# Patient Record
Sex: MALE | Race: WHITE | NOT HISPANIC OR LATINO | Employment: OTHER | URBAN - METROPOLITAN AREA
[De-identification: names, ages, dates, MRNs, and addresses within clinical notes are randomized per-mention and may not be internally consistent; named-entity substitution may affect disease eponyms.]

---

## 2017-01-09 ENCOUNTER — AMBULATORY - HEALTHEAST (OUTPATIENT)
Dept: INTERNAL MEDICINE | Facility: CLINIC | Age: 72
End: 2017-01-09

## 2017-01-09 ENCOUNTER — COMMUNICATION - HEALTHEAST (OUTPATIENT)
Dept: INTERNAL MEDICINE | Facility: CLINIC | Age: 72
End: 2017-01-09

## 2017-01-09 DIAGNOSIS — G89.29 CHRONIC LOW BACK PAIN: ICD-10-CM

## 2017-01-09 DIAGNOSIS — M54.50 CHRONIC LOW BACK PAIN: ICD-10-CM

## 2017-02-16 ENCOUNTER — RECORDS - HEALTHEAST (OUTPATIENT)
Dept: ADMINISTRATIVE | Facility: OTHER | Age: 72
End: 2017-02-16

## 2017-02-18 ENCOUNTER — COMMUNICATION - HEALTHEAST (OUTPATIENT)
Dept: INTERNAL MEDICINE | Facility: CLINIC | Age: 72
End: 2017-02-18

## 2017-02-18 DIAGNOSIS — M47.816 OSTEOARTHRITIS OF LUMBAR SPINE: ICD-10-CM

## 2017-02-26 ENCOUNTER — RECORDS - HEALTHEAST (OUTPATIENT)
Dept: ADMINISTRATIVE | Facility: OTHER | Age: 72
End: 2017-02-26

## 2017-03-13 ENCOUNTER — OFFICE VISIT - HEALTHEAST (OUTPATIENT)
Dept: INTERNAL MEDICINE | Facility: CLINIC | Age: 72
End: 2017-03-13

## 2017-03-13 DIAGNOSIS — R05.9 COUGH: ICD-10-CM
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
## 2017-03-13 DIAGNOSIS — J32.0 CHRONIC MAXILLARY SINUSITIS: ICD-10-CM

## 2017-03-13 DIAGNOSIS — J32.0 CHRONIC SINUSITIS OF BOTH MAXILLARY SINUSES: ICD-10-CM

## 2017-03-13 ASSESSMENT — MIFFLIN-ST. JEOR: SCORE: 1701.74

## 2017-03-15 ENCOUNTER — COMMUNICATION - HEALTHEAST (OUTPATIENT)
Dept: INTERNAL MEDICINE | Facility: CLINIC | Age: 72
End: 2017-03-15

## 2017-03-15 LAB
C-ANCA - HISTORICAL: NEGATIVE
P-ANCA - HISTORICAL: NEGATIVE

## 2017-03-30 ENCOUNTER — COMMUNICATION - HEALTHEAST (OUTPATIENT)
Dept: INTERNAL MEDICINE | Facility: CLINIC | Age: 72
End: 2017-03-30

## 2017-04-04 ENCOUNTER — COMMUNICATION - HEALTHEAST (OUTPATIENT)
Dept: INTERNAL MEDICINE | Facility: CLINIC | Age: 72
End: 2017-04-04

## 2017-04-05 ENCOUNTER — OFFICE VISIT - HEALTHEAST (OUTPATIENT)
Dept: INTERNAL MEDICINE | Facility: CLINIC | Age: 72
End: 2017-04-05

## 2017-04-05 DIAGNOSIS — Z01.818 PREOPERATIVE EXAMINATION: ICD-10-CM

## 2017-04-05 DIAGNOSIS — H02.403 BLEPHAROPTOSIS, BILATERAL: ICD-10-CM

## 2017-04-05 ASSESSMENT — MIFFLIN-ST. JEOR: SCORE: 1692.67

## 2017-04-26 ENCOUNTER — AMBULATORY - HEALTHEAST (OUTPATIENT)
Dept: INTERNAL MEDICINE | Facility: CLINIC | Age: 72
End: 2017-04-26

## 2017-04-26 ENCOUNTER — OFFICE VISIT - HEALTHEAST (OUTPATIENT)
Dept: INTERNAL MEDICINE | Facility: CLINIC | Age: 72
End: 2017-04-26

## 2017-04-26 DIAGNOSIS — Z01.818 PREOP EXAMINATION: ICD-10-CM

## 2017-04-26 DIAGNOSIS — J32.0 MAXILLARY SINUSITIS, CHRONIC: ICD-10-CM

## 2017-04-26 ASSESSMENT — MIFFLIN-ST. JEOR: SCORE: 1697.21

## 2017-05-01 ENCOUNTER — RECORDS - HEALTHEAST (OUTPATIENT)
Dept: ADMINISTRATIVE | Facility: OTHER | Age: 72
End: 2017-05-01

## 2017-05-27 ENCOUNTER — COMMUNICATION - HEALTHEAST (OUTPATIENT)
Dept: INTERNAL MEDICINE | Facility: CLINIC | Age: 72
End: 2017-05-27

## 2017-05-27 DIAGNOSIS — M47.816 OSTEOARTHRITIS OF LUMBAR SPINE: ICD-10-CM

## 2017-07-18 ENCOUNTER — COMMUNICATION - HEALTHEAST (OUTPATIENT)
Dept: INTERNAL MEDICINE | Facility: CLINIC | Age: 72
End: 2017-07-18

## 2017-07-18 ENCOUNTER — RECORDS - HEALTHEAST (OUTPATIENT)
Dept: ADMINISTRATIVE | Facility: OTHER | Age: 72
End: 2017-07-18

## 2017-08-08 ENCOUNTER — RECORDS - HEALTHEAST (OUTPATIENT)
Dept: ADMINISTRATIVE | Facility: OTHER | Age: 72
End: 2017-08-08

## 2017-08-10 ENCOUNTER — RECORDS - HEALTHEAST (OUTPATIENT)
Dept: ADMINISTRATIVE | Facility: OTHER | Age: 72
End: 2017-08-10

## 2017-08-14 ENCOUNTER — HOSPITAL ENCOUNTER (OUTPATIENT)
Dept: CT IMAGING | Facility: CLINIC | Age: 72
Discharge: HOME | End: 2017-08-14

## 2017-08-14 DIAGNOSIS — R05.9 COUGH: ICD-10-CM

## 2017-08-17 ENCOUNTER — RECORDS - HEALTHEAST (OUTPATIENT)
Dept: ADMINISTRATIVE | Facility: OTHER | Age: 72
End: 2017-08-17

## 2017-08-21 ENCOUNTER — RECORDS - HEALTHEAST (OUTPATIENT)
Dept: ADMINISTRATIVE | Facility: OTHER | Age: 72
End: 2017-08-21

## 2017-09-05 ENCOUNTER — OFFICE VISIT - HEALTHEAST (OUTPATIENT)
Dept: FAMILY MEDICINE | Facility: CLINIC | Age: 72
End: 2017-09-05

## 2017-09-05 DIAGNOSIS — R05.9 COUGH: ICD-10-CM

## 2017-09-06 ENCOUNTER — COMMUNICATION - HEALTHEAST (OUTPATIENT)
Dept: INTERNAL MEDICINE | Facility: CLINIC | Age: 72
End: 2017-09-06

## 2017-09-06 DIAGNOSIS — M47.816 OSTEOARTHRITIS OF LUMBAR SPINE: ICD-10-CM

## 2017-09-12 ENCOUNTER — RECORDS - HEALTHEAST (OUTPATIENT)
Dept: ADMINISTRATIVE | Facility: OTHER | Age: 72
End: 2017-09-12

## 2017-10-17 ENCOUNTER — RECORDS - HEALTHEAST (OUTPATIENT)
Dept: ADMINISTRATIVE | Facility: OTHER | Age: 72
End: 2017-10-17

## 2018-02-06 ENCOUNTER — RECORDS - HEALTHEAST (OUTPATIENT)
Dept: ADMINISTRATIVE | Facility: OTHER | Age: 73
End: 2018-02-06

## 2018-02-06 ENCOUNTER — COMMUNICATION - HEALTHEAST (OUTPATIENT)
Dept: INTERNAL MEDICINE | Facility: CLINIC | Age: 73
End: 2018-02-06

## 2018-02-09 ENCOUNTER — AMBULATORY - HEALTHEAST (OUTPATIENT)
Dept: INTERNAL MEDICINE | Facility: CLINIC | Age: 73
End: 2018-02-09

## 2018-02-12 ENCOUNTER — OFFICE VISIT - HEALTHEAST (OUTPATIENT)
Dept: INTERNAL MEDICINE | Facility: CLINIC | Age: 73
End: 2018-02-12

## 2018-02-12 DIAGNOSIS — G47.33 OBSTRUCTIVE SLEEP APNEA HYPOPNEA, SEVERE: ICD-10-CM

## 2018-02-12 DIAGNOSIS — Z01.818 PREOP EXAMINATION: ICD-10-CM

## 2018-02-12 DIAGNOSIS — J34.2 NASAL SEPTAL DEVIATION: ICD-10-CM

## 2018-02-12 DIAGNOSIS — J32.9 CHRONIC SINUS INFECTION: ICD-10-CM

## 2018-02-12 LAB
ANION GAP SERPL CALCULATED.3IONS-SCNC: 13 MMOL/L (ref 5–18)
ATRIAL RATE - MUSE: 69 BPM
BASOPHILS # BLD AUTO: 0.1 THOU/UL (ref 0–0.2)
BASOPHILS NFR BLD AUTO: 1 % (ref 0–2)
BUN SERPL-MCNC: 19 MG/DL (ref 8–28)
CALCIUM SERPL-MCNC: 9.1 MG/DL (ref 8.5–10.5)
CHLORIDE BLD-SCNC: 102 MMOL/L (ref 98–107)
CO2 SERPL-SCNC: 24 MMOL/L (ref 22–31)
CREAT SERPL-MCNC: 0.95 MG/DL (ref 0.7–1.3)
DIASTOLIC BLOOD PRESSURE - MUSE: NORMAL MMHG
EOSINOPHIL # BLD AUTO: 0.2 THOU/UL (ref 0–0.4)
EOSINOPHIL NFR BLD AUTO: 2 % (ref 0–6)
ERYTHROCYTE [DISTWIDTH] IN BLOOD BY AUTOMATED COUNT: 12.4 % (ref 11–14.5)
GFR SERPL CREATININE-BSD FRML MDRD: >60 ML/MIN/1.73M2
GLUCOSE BLD-MCNC: 99 MG/DL (ref 70–125)
HCT VFR BLD AUTO: 44 % (ref 40–54)
HGB BLD-MCNC: 14.5 G/DL (ref 14–18)
INTERPRETATION ECG - MUSE: NORMAL
LYMPHOCYTES # BLD AUTO: 2.6 THOU/UL (ref 0.8–4.4)
LYMPHOCYTES NFR BLD AUTO: 26 % (ref 20–40)
MCH RBC QN AUTO: 31.1 PG (ref 27–34)
MCHC RBC AUTO-ENTMCNC: 33 G/DL (ref 32–36)
MCV RBC AUTO: 94 FL (ref 80–100)
MONOCYTES # BLD AUTO: 1 THOU/UL (ref 0–0.9)
MONOCYTES NFR BLD AUTO: 10 % (ref 2–10)
NEUTROPHILS # BLD AUTO: 6.2 THOU/UL (ref 2–7.7)
NEUTROPHILS NFR BLD AUTO: 62 % (ref 50–70)
P AXIS - MUSE: 65 DEGREES
PLATELET # BLD AUTO: 315 THOU/UL (ref 140–440)
PMV BLD AUTO: 6.9 FL (ref 7–10)
POTASSIUM BLD-SCNC: 4.4 MMOL/L (ref 3.5–5)
PR INTERVAL - MUSE: 168 MS
QRS DURATION - MUSE: 74 MS
QT - MUSE: 384 MS
QTC - MUSE: 411 MS
R AXIS - MUSE: 82 DEGREES
RBC # BLD AUTO: 4.67 MILL/UL (ref 4.4–6.2)
SODIUM SERPL-SCNC: 139 MMOL/L (ref 136–145)
SYSTOLIC BLOOD PRESSURE - MUSE: NORMAL MMHG
T AXIS - MUSE: 54 DEGREES
VENTRICULAR RATE- MUSE: 69 BPM
WBC: 10 THOU/UL (ref 4–11)

## 2018-02-12 ASSESSMENT — MIFFLIN-ST. JEOR: SCORE: 1738.03

## 2018-02-14 ENCOUNTER — RECORDS - HEALTHEAST (OUTPATIENT)
Dept: ADMINISTRATIVE | Facility: OTHER | Age: 73
End: 2018-02-14

## 2018-02-14 ENCOUNTER — TRANSFERRED RECORDS (OUTPATIENT)
Dept: HEALTH INFORMATION MANAGEMENT | Facility: CLINIC | Age: 73
End: 2018-02-14

## 2018-07-31 ENCOUNTER — RECORDS - HEALTHEAST (OUTPATIENT)
Dept: ADMINISTRATIVE | Facility: OTHER | Age: 73
End: 2018-07-31

## 2018-11-12 ENCOUNTER — RECORDS - HEALTHEAST (OUTPATIENT)
Dept: ADMINISTRATIVE | Facility: OTHER | Age: 73
End: 2018-11-12

## 2018-12-03 ENCOUNTER — TRANSFERRED RECORDS (OUTPATIENT)
Dept: HEALTH INFORMATION MANAGEMENT | Facility: CLINIC | Age: 73
End: 2018-12-03

## 2019-01-03 ENCOUNTER — COMMUNICATION - HEALTHEAST (OUTPATIENT)
Dept: INTERNAL MEDICINE | Facility: CLINIC | Age: 74
End: 2019-01-03

## 2019-01-03 ENCOUNTER — AMBULATORY - HEALTHEAST (OUTPATIENT)
Dept: INTERNAL MEDICINE | Facility: CLINIC | Age: 74
End: 2019-01-03

## 2019-01-03 DIAGNOSIS — J32.9 CHRONIC SINUSITIS, UNSPECIFIED LOCATION: ICD-10-CM

## 2019-01-04 ENCOUNTER — MEDICAL CORRESPONDENCE (OUTPATIENT)
Dept: HEALTH INFORMATION MANAGEMENT | Facility: CLINIC | Age: 74
End: 2019-01-04

## 2019-02-01 ENCOUNTER — OFFICE VISIT - HEALTHEAST (OUTPATIENT)
Dept: INTERNAL MEDICINE | Facility: CLINIC | Age: 74
End: 2019-02-01

## 2019-02-01 DIAGNOSIS — Z12.11 SCREEN FOR COLON CANCER: ICD-10-CM

## 2019-02-01 DIAGNOSIS — R07.89 ATYPICAL CHEST PAIN: ICD-10-CM

## 2019-02-01 LAB
ATRIAL RATE - MUSE: 72 BPM
DIASTOLIC BLOOD PRESSURE - MUSE: NORMAL MMHG
INTERPRETATION ECG - MUSE: NORMAL
P AXIS - MUSE: NORMAL DEGREES
PR INTERVAL - MUSE: NORMAL MS
QRS DURATION - MUSE: 74 MS
QT - MUSE: 370 MS
QTC - MUSE: 405 MS
R AXIS - MUSE: 78 DEGREES
SYSTOLIC BLOOD PRESSURE - MUSE: NORMAL MMHG
T AXIS - MUSE: 74 DEGREES
VENTRICULAR RATE- MUSE: 72 BPM

## 2019-02-01 ASSESSMENT — MIFFLIN-ST. JEOR: SCORE: 1724.42

## 2019-02-11 ENCOUNTER — COMMUNICATION - HEALTHEAST (OUTPATIENT)
Dept: INTERNAL MEDICINE | Facility: CLINIC | Age: 74
End: 2019-02-11

## 2019-02-11 ENCOUNTER — DOCUMENTATION ONLY (OUTPATIENT)
Dept: CARE COORDINATION | Facility: CLINIC | Age: 74
End: 2019-02-11

## 2019-02-11 DIAGNOSIS — R06.09 DYSPNEA ON EXERTION: ICD-10-CM

## 2019-02-28 ENCOUNTER — PRE VISIT (OUTPATIENT)
Dept: OTOLARYNGOLOGY | Facility: CLINIC | Age: 74
End: 2019-02-28

## 2019-02-28 NOTE — TELEPHONE ENCOUNTER
FUTURE VISIT INFORMATION      FUTURE VISIT INFORMATION:    Date: 3/4/19    Time: 4:15PM    [place] [facility] [place]  REFERRAL INFORMATION:    Referring provider:  Nj Laureano MD      Referring providers clinic:  [facility]      Reason for visit/diagnosis  Chronic sinus infection (Primary Dx); Nasal septal deviation;     RECORDS REQUESTED FROM:       Clinic name Comments Records Status Imaging Status   [facility] 2/12/18,. 2/12/18, 11/25/16, 10/28/16 notes with Dr Laureano    2/1/19 notes with Bernardino Vergara MD   Care Everywhere    [facility]  2/14/18 SEPTOPLASTY, BILATERAL ENDOSCOPIC MAXILLARY ANTROSTOMIES   with Dr Gamaliel Pratt Care Everywhere    [facility] ph. [phone]   11/28/16 CT Sinus Report: Care [facility] PACS   [facility] 7/13/15, 2/1/18 CT Sinus   2/14/18 operative notes with Dr Gamaliel Pratt   112/3/18 -7/13/15 notes with Dr Pratt and Dr Oz Sanchez  Report: in [facility] images 3/1  - received 3/7                 2/28/19 4:05PM sent a fax to [facility] to send records - Amay  *received records on 3/1/19 (about 40 pages) - amay     3/1/19 4:07PM called [facility] to get images pushed to [facility] PACS - amay  3/1/19 4:18PM [facility] does CT Sinus in house , sent a fax request [facility] to mail out images - amay     3/7/19 2PM received images, sending to Film room - amay

## 2019-03-04 ENCOUNTER — OFFICE VISIT (OUTPATIENT)
Dept: OTOLARYNGOLOGY | Facility: CLINIC | Age: 74
End: 2019-03-04
Payer: MEDICARE

## 2019-03-04 ENCOUNTER — ANCILLARY PROCEDURE (OUTPATIENT)
Dept: CT IMAGING | Facility: CLINIC | Age: 74
End: 2019-03-04
Attending: OTOLARYNGOLOGY
Payer: MEDICARE

## 2019-03-04 ENCOUNTER — RECORDS - HEALTHEAST (OUTPATIENT)
Dept: ADMINISTRATIVE | Facility: OTHER | Age: 74
End: 2019-03-04

## 2019-03-04 VITALS
BODY MASS INDEX: 29.4 KG/M2 | HEIGHT: 71 IN | SYSTOLIC BLOOD PRESSURE: 153 MMHG | WEIGHT: 210 LBS | DIASTOLIC BLOOD PRESSURE: 78 MMHG | HEART RATE: 81 BPM

## 2019-03-04 DIAGNOSIS — R53.82 CHRONIC FATIGUE: ICD-10-CM

## 2019-03-04 DIAGNOSIS — R49.0 HOARSENESS: ICD-10-CM

## 2019-03-04 DIAGNOSIS — J32.4 CHRONIC PANSINUSITIS: ICD-10-CM

## 2019-03-04 DIAGNOSIS — J32.4 CHRONIC PANSINUSITIS: Primary | ICD-10-CM

## 2019-03-04 LAB
BASOPHILS # BLD AUTO: 0 10E9/L (ref 0–0.2)
BASOPHILS NFR BLD AUTO: 0.8 %
CRP SERPL-MCNC: 16.7 MG/L (ref 0–8)
DIFFERENTIAL METHOD BLD: NORMAL
EOSINOPHIL # BLD AUTO: 0.2 10E9/L (ref 0–0.7)
EOSINOPHIL NFR BLD AUTO: 4.2 %
ERYTHROCYTE [DISTWIDTH] IN BLOOD BY AUTOMATED COUNT: 12.6 % (ref 10–15)
HCT VFR BLD AUTO: 41.8 % (ref 40–53)
HGB BLD-MCNC: 13.4 G/DL (ref 13.3–17.7)
IMM GRANULOCYTES # BLD: 0 10E9/L (ref 0–0.4)
IMM GRANULOCYTES NFR BLD: 0.2 %
LYMPHOCYTES # BLD AUTO: 1.4 10E9/L (ref 0.8–5.3)
LYMPHOCYTES NFR BLD AUTO: 27.7 %
MCH RBC QN AUTO: 29.8 PG (ref 26.5–33)
MCHC RBC AUTO-ENTMCNC: 32.1 G/DL (ref 31.5–36.5)
MCV RBC AUTO: 93 FL (ref 78–100)
MONOCYTES # BLD AUTO: 0.7 10E9/L (ref 0–1.3)
MONOCYTES NFR BLD AUTO: 13.8 %
NEUTROPHILS # BLD AUTO: 2.7 10E9/L (ref 1.6–8.3)
NEUTROPHILS NFR BLD AUTO: 53.3 %
NRBC # BLD AUTO: 0 10*3/UL
NRBC BLD AUTO-RTO: 0 /100
PLATELET # BLD AUTO: 250 10E9/L (ref 150–450)
RBC # BLD AUTO: 4.5 10E12/L (ref 4.4–5.9)
TSH SERPL DL<=0.005 MIU/L-ACNC: 2.32 MU/L (ref 0.4–4)
WBC # BLD AUTO: 5 10E9/L (ref 4–11)

## 2019-03-04 PROCEDURE — 82784 ASSAY IGA/IGD/IGG/IGM EACH: CPT | Performed by: OTOLARYNGOLOGY

## 2019-03-04 PROCEDURE — 86255 FLUORESCENT ANTIBODY SCREEN: CPT | Performed by: OTOLARYNGOLOGY

## 2019-03-04 PROCEDURE — 82785 ASSAY OF IGE: CPT | Performed by: OTOLARYNGOLOGY

## 2019-03-04 PROCEDURE — 86038 ANTINUCLEAR ANTIBODIES: CPT | Performed by: OTOLARYNGOLOGY

## 2019-03-04 RX ORDER — MULTIVIT WITH MINERALS/LUTEIN
1 TABLET ORAL DAILY
COMMUNITY

## 2019-03-04 ASSESSMENT — MIFFLIN-ST. JEOR: SCORE: 1717.55

## 2019-03-04 ASSESSMENT — PAIN SCALES - GENERAL: PAINLEVEL: NO PAIN (0)

## 2019-03-04 NOTE — NURSING NOTE
"Chief Complaint   Patient presents with     Consult     chronic sinusitis      Blood pressure 153/78, pulse 81, height 1.8 m (5' 10.87\"), weight 95.3 kg (210 lb).    Alexey Garrison LPN    "

## 2019-03-04 NOTE — PROGRESS NOTES
Otolaryngology Adult Consultation    Patient: Matthew Platt   : 1945     Patient presents with:  Consult: No chief complaint on file.       Referring Provider: Nj Laureano   Consulting Physician:  Meghana Oconnor MD       Assessment/Plan: CRS, needs medical work up. Labs and repeat imaging ordered.  Will contact patient with results.      HPI: Matthew Platt is a 73 year old male seen today in the Otolaryngology Clinic in consultation from Dr. Laureano for a history of chronic sinusitis.  Symptoms include chronic cough. Duration of symptoms has been 4 years. Previous medical therapies tried and their effects include multiple antibiotics, sometimes with prednisone - did not seem to get better. Antibiotics may have helped temporarily. Previous surgery performed includes septoplasty, bilateral sinus surgery.  Still with cough, PND, posterior sinus pressure.  Uses CPAP - gets dried out.  Associated lower respiratory symptoms are cough, hoarseness.  Bloody nose 2 times in last month.  Uses neilmed rinses and budesonide rinses - no help. GERD - prescribed medicine, zantac - no help. Takes TUMS for calcium. Plugged ears, left required tube. Front teeth sensitive - cracked crown during surgery.     Prior nasal injuries due to football.     Previous work up has been performed including allergy testing negative, CXR clear, EKG normal.      Current Outpatient Medications on File Prior to Visit:  cyclobenzaprine (FLEXERIL) 5 MG tablet Take 5 mg by mouth 3 times daily as needed.   glucosamine 500 MG CAPS Take  by mouth.   ibuprofen (ADVIL) 200 MG capsule Take 200 mg by mouth every 4 hours as needed.     No current facility-administered medications on file prior to visit.      No Known Allergies    No past medical history on file.      Review of Systems   ENT ROS 3/2/2019   Neurology Dizzy spells, Headache   Eyes Visual loss, Double vision   Ears, Nose, Throat Ringing/noise in ears, Nasal congestion or  drainage, Hoarseness   Cardiopulmonary Cough, Chest pain   Musculoskeletal Sore or stiff joints, Back pain   Endocrine Frequent urination        14 point ROS neg other than the symptoms noted above.    Physical Exam:    General Assessment   The patient is alert, oriented and in no acute distress.   Head/Face/Scalp  Grossly normal.   Ears  Normal canals, auricles and tympanic membranes.   Nose  External nose is straight, skin is normal. Intranasal exam (anterior rhinoscopy) reveals normal moist mucosa, turbinate tissue without edema, erythema or crusting.  Septum mainly in the midline.    Mouth  Oral cavity shows healthy mucosa with out ulceration, masses or other lesions involving the tongue, palate, buccal mucosa, floor of mouth or gingiva.  Dentition in good repair.  Oropharynx with normal tonsils, posterior wall and base of tongue.  Neck  No significant adenopathy, thyroid or salivary gland abnormality.         Upper airway endoscopy  performed to examine the upper airway for pathology, functional or anatomic abnormality.  Verbal consent is obtained.  Topical anesthetic/decongestant solution is applied per patient request.  The flexible endoscope was passed into each nasal cavity separately, and advanced to the larynx.  Findings are as follows:   Nasal passages with dry thick mucous throughout.   Nasopharynx:  Clear, no lesions, crusting or inflammation   Base of tongue, vallecula, epiglottis, aryepiglottic folds, false vocal folds without  mucosal lesions, masses or anatomic abnormality.   Glottis with normal movement of vocal folds, normal mucosa and no lesions, dry - disrupted mucosal wave   Pyriform sinuses, post-cricoid area, and posterior pharyngeal wall without lesions

## 2019-03-04 NOTE — PATIENT INSTRUCTIONS
Thank you for choosing  [facility].  Please complete labs and CT scans today.  Will contact you about results via phone or [employer].  [phone] appointment scheduling option 1 and nurse advice option 3.

## 2019-03-04 NOTE — LETTER
3/4/2019       RE: Matthew Platt   [address]  [place] [facility] [address]     Dear Colleague,    Thank you for referring your patient, Matthew Platt, to the [facility]ROAT at [facility]. Please see a copy of my visit note below.    Otolaryngology Adult Consultation    Patient: Matthew Platt   : 1945     Patient presents with:  Consult: No chief complaint on file.       Referring Provider: Nj Laureano   Consulting Physician:  Meghana Oconnor MD       Assessment/Plan: CRS, needs medical work up. Labs and repeat imaging ordered.  Will contact patient with results.      HPI: Matthew Platt is a 73 year old male seen today in the Otolaryngology Clinic in consultation from Dr. Laureano for a history of chronic sinusitis.  Symptoms include chronic cough. Duration of symptoms has been 4 years. Previous medical therapies tried and their effects include multiple antibiotics, sometimes with prednisone - did not seem to get better. Antibiotics may have helped temporarily. Previous surgery performed includes septoplasty, bilateral sinus surgery.  Still with cough, PND, posterior sinus pressure.  Uses CPAP - gets dried out.  Associated lower respiratory symptoms are cough, hoarseness.  Bloody nose 2 times in last month.  Uses neilmed rinses and budesonide rinses - no help. GERD - prescribed medicine, zantac - no help. Takes TUMS for calcium. Plugged ears, left required tube. Front teeth sensitive - cracked crown during surgery.     Prior nasal injuries due to football.     Previous work up has been performed including allergy testing negative, CXR clear, EKG normal.      Current Outpatient Medications on File Prior to Visit:  cyclobenzaprine (FLEXERIL) 5 MG tablet Take 5 mg by mouth 3 times daily as needed.   glucosamine 500 MG CAPS Take  by mouth.   ibuprofen (ADVIL) 200 MG capsule Take 200 mg by mouth every 4 hours as needed.     No current  facility-administered medications on file prior to visit.      No Known Allergies    No past medical history on file.      Review of Systems   ENT ROS 3/2/2019   Neurology Dizzy spells, Headache   Eyes Visual loss, Double vision   Ears, Nose, Throat Ringing/noise in ears, Nasal congestion or drainage, Hoarseness   Cardiopulmonary Cough, Chest pain   Musculoskeletal Sore or stiff joints, Back pain   Endocrine Frequent urination        14 point ROS neg other than the symptoms noted above.    Physical Exam:    General Assessment   The patient is alert, oriented and in no acute distress.   Head/Face/Scalp  Grossly normal.   Ears  Normal canals, auricles and tympanic membranes.   Nose  External nose is straight, skin is normal. Intranasal exam (anterior rhinoscopy) reveals normal moist mucosa, turbinate tissue without edema, erythema or crusting.  Septum mainly in the midline.    Mouth  Oral cavity shows healthy mucosa with out ulceration, masses or other lesions involving the tongue, palate, buccal mucosa, floor of mouth or gingiva.  Dentition in good repair.  Oropharynx with normal tonsils, posterior wall and base of tongue.  Neck  No significant adenopathy, thyroid or salivary gland abnormality.         Upper airway endoscopy  performed to examine the upper airway for pathology, functional or anatomic abnormality.  Verbal consent is obtained.  Topical anesthetic/decongestant solution is applied per patient request.  The flexible endoscope was passed into each nasal cavity separately, and advanced to the larynx.  Findings are as follows:   Nasal passages with dry thick mucous throughout.   Nasopharynx:  Clear, no lesions, crusting or inflammation   Base of tongue, vallecula, epiglottis, aryepiglottic folds, false vocal folds without  mucosal lesions, masses or anatomic abnormality.   Glottis with normal movement of vocal folds, normal mucosa and no lesions, dry - disrupted mucosal wave   Pyriform sinuses,  post-cricoid area, and posterior pharyngeal wall without lesions       Again, thank you for allowing me to participate in the care of your patient.      Sincerely,    Meghana Oconnor MD

## 2019-03-05 LAB — IGE SERPL-ACNC: 47 KIU/L (ref 0–114)

## 2019-03-06 LAB
IGA SERPL-MCNC: 589 MG/DL (ref 70–380)
IGG SERPL-MCNC: 1740 MG/DL (ref 695–1620)
IGM SERPL-MCNC: 54 MG/DL (ref 60–265)

## 2019-03-07 LAB
ANA SER QL IF: NEGATIVE
ANCA AB PATTERN SER IF-IMP: NORMAL
C-ANCA TITR SER IF: NORMAL {TITER}

## 2019-03-10 NOTE — RESULT ENCOUNTER NOTE
"Russell here are the lab results that were abnormal - would like him to see immunologist/hematologist per my other note.     His sinuses are open, but he has some lab tests that are abnormal - I would like to refer him to an immunologist.  He could be seen here by one of our benign hematology docs or he could see an outside allergist/immunologist. Here is what I sent to patient:    \"Some of the lab tests are abnormal, although not alarmingly so. Given your ongoing symptoms and infections I would like you to meet with an immune  - my nurse will give you a call to arrange.\"  "

## 2019-03-11 ENCOUNTER — COMMUNICATION - HEALTHEAST (OUTPATIENT)
Dept: INTERNAL MEDICINE | Facility: CLINIC | Age: 74
End: 2019-03-11

## 2019-03-19 ENCOUNTER — TELEPHONE (OUTPATIENT)
Dept: ONCOLOGY | Facility: CLINIC | Age: 74
End: 2019-03-19

## 2019-03-19 NOTE — TELEPHONE ENCOUNTER
ONCOLOGY INTAKE: Records Information      APPT INFORMATION:  Referring provider:  Anastasia  Referring provider s clinic:  see order  Reason for visit/diagnosis:  Chronic pansinusitis [J32.4]    Were the records received with the referral (via [employer])? No, internal referral    Has patient been seen for any external appt for this diagnosis (enter clinic/location)? [facility]    ADDITIONAL INFORMATION:

## 2019-03-22 ENCOUNTER — PATIENT OUTREACH (OUTPATIENT)
Dept: ONCOLOGY | Facility: CLINIC | Age: 74
End: 2019-03-22

## 2019-03-22 NOTE — PROGRESS NOTES
Called New Pt seeing  for abnormal Immunoglobulin labs & chronic pansinusitis at appt on 3-26-19 at 1:20pm  Provided brief directions to our Clinic's location & phone #.  Pt verbalized understanding.    Records are in Epic.

## 2019-03-25 NOTE — TELEPHONE ENCOUNTER
RECORDS STATUS - ALL OTHER DIAGNOSIS      RECORDS RECEIVED FROM: [facility]   DATE RECEIVED: Ongoing   NOTES STATUS DETAILS   OFFICE NOTE from referring provider Received Epic- Dr. Oconnor   OFFICE NOTE from medical oncologist none    DISCHARGE SUMMARY from hospital none    DISCHARGE REPORT from the ER none    OPERATIVE REPORT Received CE   MEDICATION LIST Received Epic/CE   CLINICAL TRIAL TREATMENTS TO DATE     LABS     PATHOLOGY REPORTS Received CE- 3/11/16   ANYTHING RELATED TO DIAGNOSIS Received CE/Epic   GENONOMIC TESTING     TYPE: none    IMAGING (NEED IMAGES & REPORT)     CT SCANS Received PACS   MRI None    MAMMO none    ULTRASOUND none    PET none

## 2019-03-26 ENCOUNTER — ONCOLOGY VISIT (OUTPATIENT)
Dept: ONCOLOGY | Facility: CLINIC | Age: 74
End: 2019-03-26
Attending: INTERNAL MEDICINE
Payer: MEDICARE

## 2019-03-26 ENCOUNTER — HOSPITAL ENCOUNTER (OUTPATIENT)
Facility: CLINIC | Age: 74
Setting detail: SPECIMEN
Discharge: HOME | End: 2019-03-26
Attending: INTERNAL MEDICINE | Admitting: INTERNAL MEDICINE
Payer: MEDICARE

## 2019-03-26 ENCOUNTER — PRE VISIT (OUTPATIENT)
Dept: ONCOLOGY | Facility: CLINIC | Age: 74
End: 2019-03-26

## 2019-03-26 VITALS
HEIGHT: 72 IN | HEART RATE: 103 BPM | BODY MASS INDEX: 28.33 KG/M2 | OXYGEN SATURATION: 99 % | DIASTOLIC BLOOD PRESSURE: 79 MMHG | WEIGHT: 209.2 LBS | SYSTOLIC BLOOD PRESSURE: 151 MMHG | TEMPERATURE: 98.2 F

## 2019-03-26 DIAGNOSIS — D47.2 GAMMOPATHY: Primary | ICD-10-CM

## 2019-03-26 LAB
ANION GAP SERPL CALCULATED.3IONS-SCNC: 6 MMOL/L (ref 3–14)
BUN SERPL-MCNC: 16 MG/DL (ref 7–30)
CALCIUM SERPL-MCNC: 9 MG/DL (ref 8.5–10.1)
CHLORIDE SERPL-SCNC: 105 MMOL/L (ref 94–109)
CO2 SERPL-SCNC: 26 MMOL/L (ref 20–32)
CREAT SERPL-MCNC: 0.9 MG/DL (ref 0.66–1.25)
GFR SERPL CREATININE-BSD FRML MDRD: 84 ML/MIN/{1.73_M2}
GLUCOSE SERPL-MCNC: 98 MG/DL (ref 70–99)
POTASSIUM SERPL-SCNC: 4.2 MMOL/L (ref 3.4–5.3)
SODIUM SERPL-SCNC: 137 MMOL/L (ref 133–144)

## 2019-03-26 PROCEDURE — 84165 PROTEIN E-PHORESIS SERUM: CPT | Performed by: INTERNAL MEDICINE

## 2019-03-26 PROCEDURE — 00000402 ZZHCL STATISTIC TOTAL PROTEIN: Performed by: INTERNAL MEDICINE

## 2019-03-26 PROCEDURE — G0463 HOSPITAL OUTPT CLINIC VISIT: HCPCS

## 2019-03-26 PROCEDURE — 83883 ASSAY NEPHELOMETRY NOT SPEC: CPT | Performed by: INTERNAL MEDICINE

## 2019-03-26 PROCEDURE — 99204 OFFICE O/P NEW MOD 45 MIN: CPT | Performed by: INTERNAL MEDICINE

## 2019-03-26 PROCEDURE — 82784 ASSAY IGA/IGD/IGG/IGM EACH: CPT | Performed by: INTERNAL MEDICINE

## 2019-03-26 PROCEDURE — 86334 IMMUNOFIX E-PHORESIS SERUM: CPT | Performed by: INTERNAL MEDICINE

## 2019-03-26 PROCEDURE — 80048 BASIC METABOLIC PNL TOTAL CA: CPT | Performed by: INTERNAL MEDICINE

## 2019-03-26 ASSESSMENT — PAIN SCALES - GENERAL: PAINLEVEL: NO PAIN (0)

## 2019-03-26 ASSESSMENT — MIFFLIN-ST. JEOR: SCORE: 1731.92

## 2019-03-26 NOTE — PROGRESS NOTES
Medical Assistant Note:  Matthew Platt presents today for Blood brody  Patient seen by provider today: Yes: SUNI.   present during visit today: Not Applicable.    Concerns: No Concerns.    Procedure:  Lab draw site: Left hand, Needle type: bf, Gauge: 23.    Post Assessment:  Labs drawn without difficulty: Yes.    Discharge Plan:  Departure Mode: Ambulatory.    Face to Face Time: 5min  Patient tolerated draw well.    Nancy Monroy

## 2019-03-26 NOTE — Clinical Note
3/26/2019         RE: Matthew Platt  [address]  [place] [address]        Dear Colleague,    Thank you for referring your patient, Matthew Platt, to the [facility]. Please see a copy of my visit note below.    Oncology Rooming Note    March 26, 2019 1:29 PM   Matthew Platt is a 73 year old male who presents for:    Chief Complaint   Patient presents with     Oncology Clinic Visit     Initial Vitals: /79 (BP Location: Left arm, Patient Position: Chair, Cuff Size: Adult Large)   Pulse 103   Temp 98.2  F (36.8  C) (Oral)   Ht 1.829 m (6')   Wt 94.9 kg (209 lb 3.2 oz)   SpO2 99%   BMI 28.37 kg/m    Estimated body mass index is 28.37 kg/m  as calculated from the following:    Height as of this encounter: 1.829 m (6').    Weight as of this encounter: 94.9 kg (209 lb 3.2 oz). Body surface area is 2.2 meters squared.  No Pain (0) Comment: Data Unavailable   No LMP for male patient.  Allergies reviewed: Yes  Medications reviewed: Yes    Medications: Medication refills not needed today.  Pharmacy name entered into [employer]: [facility] [id number] [place] [address]    Clinical concerns:  doctor was notified.      Nancy Monroy CMA    Blood was drawn today.            Medical Assistant Note:  Matthew Platt presents today for Blood brody  Patient seen by provider today: Yes: SUNI.   present during visit today: Not Applicable.    Concerns: No Concerns.    Procedure:  Lab draw site: Left hand, Needle type: bf, Gauge: 23.    Post Assessment:  Labs drawn without difficulty: Yes.    Discharge Plan:  Departure Mode: Ambulatory.    Face to Face Time: 5min  Patient tolerated draw well.    Nancy Monroy            Visit Date:   03/26/2019      This consult has been requested by Dr. Meghana Oconnor for elevated immunoglobulins level.     Mr. Platt is a 73-year-old gentleman with chronic pansinusitis.  He has been following with ENT.  He has  had multiple rounds of antibiotics.  He also had surgery.  He continues to have a sinusitis.  For further evaluation, multiple labs were done on 2019:   -CBC is normal.   -IgG elevated at 1740.   -IgA elevated at 589.   -IgM low at 54.   -SKY is negative.   -ANCA is normal.   -Normal TSH.   -Elevated CRP.      Because of this elevated immunoglobulins, patient has been referred to Hematology Clinic.  The patient denies any history of plasma cell disorder.  No history of multiple myeloma or MGUS.  No history of any malignancy.      REVIEW OF SYSTEMS:    Patient overall is doing well except for chronic sinusitis.  He has constant drainage at the back of the throat.  Because of that, he has some change in his voice.  He has some headache from sinusitis.  Sometimes he has some dizziness.  No neck pain.  No problems swallowing food.  No chest pain or difficulty breathing.  No abdominal pain, nausea or vomiting.  No urinary or bowel complaints.  No bleeding.  No bone pain.       All other review of systems negative.     ALLERGIES:  Reviewed.      MEDICATIONS:  Reviewed.      PAST MEDICAL HISTORY:   1.  Chronic pansinusitis.   2.  Septoplasty and sinus surgery.   3.  Tonsillectomy.   4.  Vasectomy.   5.  Surgery for left hand Dupuytren contracture.   6.  Blepharoplasty.   7.  Cataract surgery.   8.  Sleep apnea.   9.  Gilbert syndrome.   10.  Hyperlipidemia.      SOCIAL HISTORY:     -He smoked for about 5 years.  Quit in .   -Occasional alcohol use.      FAMILY HISTORY:   -Father  in his 80s from pancreatic cancer.   -Mother  in her 50s from hepatitis C.   -He has 1 sister who is in fairly good health.      PHYSICAL EXAMINATION:   GENERAL:  He is alert and oriented x3.   VITAL SIGNS:  Reviewed.  ECOG PS of 1.   The rest of the systems not examined.      LABORATORY DATA:  Reviewed.      ASSESSMENT:   1.  A 73-year-old gentleman with elevated IgG and IgA level.   2.  Chronic sinusitis.      RECOMMENDATIONS:    1.  I had a long discussion with the patient.  Labs were all reviewed.  He has an elevated IgG and IgA level.      Discussed with him regarding gammopathy.  Discussed regarding monoclonal versus polyclonal gammopathy.  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  At this time, my suspicion for MGUS or myeloma is very low.     2.  Discussed regarding further workup.  We will get some labs including SPEP, serum immunofixation, light chain and BMP.  If he has monoclonal protein, then further workup will be done.      3.  I explained to him that elevated IgG and IgA level is not causing his sinusitis.  In fact, his immunoglobins are elevated because of chronic sinusitis.     4.  He had a few questions, which were all answered.  I will see him in the clinic after the above investigations are back.      Thanks for the consult.      Total face-to-face time spent 45 minutes, more than 50% of the time spent in counseling and coordination of care.         OTONIEL CULP MD             D: 2019   T: 2019   MT: AS      Name:     JESUS KHAN   MRN:      [id number]        Account:      [id number]   :      1945           Visit Date:   2019      Document: [id number]       cc: Meghana Oconnor MD       Again, thank you for allowing me to participate in the care of your patient.        Sincerely,        Otoniel Culp MD

## 2019-03-26 NOTE — PROGRESS NOTES
Oncology Rooming Note    March 26, 2019 1:29 PM   Matthew Platt is a 73 year old male who presents for:    Chief Complaint   Patient presents with     Oncology Clinic Visit     Initial Vitals: /79 (BP Location: Left arm, Patient Position: Chair, Cuff Size: Adult Large)   Pulse 103   Temp 98.2  F (36.8  C) (Oral)   Ht 1.829 m (6')   Wt 94.9 kg (209 lb 3.2 oz)   SpO2 99%   BMI 28.37 kg/m   Estimated body mass index is 28.37 kg/m  as calculated from the following:    Height as of this encounter: 1.829 m (6').    Weight as of this encounter: 94.9 kg (209 lb 3.2 oz). Body surface area is 2.2 meters squared.  No Pain (0) Comment: Data Unavailable   No LMP for male patient.  Allergies reviewed: Yes  Medications reviewed: Yes    Medications: Medication refills not needed today.  Pharmacy name entered into [employer]: [facility] [id number] [place] [address]    Clinical concerns:  doctor was notified.      Nancy Monroy CMA    Blood was drawn today.

## 2019-03-27 LAB
ALBUMIN SERPL ELPH-MCNC: 4.3 G/DL (ref 3.7–5.1)
ALPHA1 GLOB SERPL ELPH-MCNC: 0.4 G/DL (ref 0.2–0.4)
ALPHA2 GLOB SERPL ELPH-MCNC: 0.9 G/DL (ref 0.5–0.9)
B-GLOBULIN SERPL ELPH-MCNC: 1.3 G/DL (ref 0.6–1)
GAMMA GLOB SERPL ELPH-MCNC: 1.8 G/DL (ref 0.7–1.6)
IGA SERPL-MCNC: 615 MG/DL (ref 70–380)
IGG SERPL-MCNC: 1800 MG/DL (ref 695–1620)
IGM SERPL-MCNC: 54 MG/DL (ref 60–265)
KAPPA LC UR-MCNC: 2.27 MG/DL (ref 0.33–1.94)
KAPPA LC/LAMBDA SER: 0.84 {RATIO} (ref 0.26–1.65)
LAMBDA LC SERPL-MCNC: 2.69 MG/DL (ref 0.57–2.63)
M PROTEIN SERPL ELPH-MCNC: 0 G/DL
PROT PATTERN SERPL ELPH-IMP: ABNORMAL
PROT PATTERN SERPL IFE-IMP: ABNORMAL

## 2019-03-28 PROCEDURE — 00000346 ZZHCL STATISTIC REVIEW OUTSIDE SLIDES TC 88321: Performed by: INTERNAL MEDICINE

## 2019-03-28 NOTE — RESULT ENCOUNTER NOTE
Dear Mr. Platt,    Blood tests do not reveal any evidence of cancer like multiple myeloma.  Will discuss more during appointment.    Please, call me with any questions.    Mike Aaron

## 2019-03-29 LAB — COPATH REPORT: NORMAL

## 2019-03-29 NOTE — PROGRESS NOTES
Visit Date:   2019      This consult has been requested by Dr. Meghana Oconnor for elevated immunoglobulins level.     Mr. Platt is a 73-year-old gentleman with chronic pansinusitis.  He has been following with ENT.  He has had multiple rounds of antibiotics.  He also had surgery.  He continues to have a sinusitis.  For further evaluation, multiple labs were done on 2019:   -CBC is normal.   -IgG elevated at 1740.   -IgA elevated at 589.   -IgM low at 54.   -SKY is negative.   -ANCA is normal.   -Normal TSH.   -Elevated CRP.      Because of this elevated immunoglobulins, patient has been referred to Hematology Clinic.  The patient denies any history of plasma cell disorder.  No history of multiple myeloma or MGUS.  No history of any malignancy.      REVIEW OF SYSTEMS:    Patient overall is doing well except for chronic sinusitis.  He has constant drainage at the back of the throat.  Because of that, he has some change in his voice.  He has some headache from sinusitis.  Sometimes he has some dizziness.  No neck pain.  No problems swallowing food.  No chest pain or difficulty breathing.  No abdominal pain, nausea or vomiting.  No urinary or bowel complaints.  No bleeding.  No bone pain.       All other review of systems negative.     ALLERGIES:  Reviewed.      MEDICATIONS:  Reviewed.      PAST MEDICAL HISTORY:   1.  Chronic pansinusitis.   2.  Septoplasty and sinus surgery.   3.  Tonsillectomy.   4.  Vasectomy.   5.  Surgery for left hand Dupuytren contracture.   6.  Blepharoplasty.   7.  Cataract surgery.   8.  Sleep apnea.   9.  Gilbert syndrome.   10.  Hyperlipidemia.      SOCIAL HISTORY:     -He smoked for about 5 years.  Quit in .   -Occasional alcohol use.      FAMILY HISTORY:   -Father  in his 80s from pancreatic cancer.   -Mother  in her 50s from hepatitis C.   -He has 1 sister who is in fairly good health.      PHYSICAL EXAMINATION:   GENERAL:  He is alert and oriented x3.   VITAL  SIGNS:  Reviewed.  ECOG PS of 1.   The rest of the systems not examined.      LABORATORY DATA:  Reviewed.      ASSESSMENT:   1.  A 73-year-old gentleman with elevated IgG and IgA level.   2.  Chronic sinusitis.      RECOMMENDATIONS:   1.  I had a long discussion with the patient.  Labs were all reviewed.  He has an elevated IgG and IgA level.      Discussed with him regarding gammopathy.  Discussed regarding monoclonal versus polyclonal gammopathy.  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  At this time, my suspicion for MGUS or myeloma is very low.     2.  Discussed regarding further workup.  We will get some labs including SPEP, serum immunofixation, light chain and BMP.  If he has monoclonal protein, then further workup will be done.      3.  I explained to him that elevated IgG and IgA level is not causing his sinusitis.  In fact, his immunoglobins are elevated because of chronic sinusitis.     4.  He had a few questions, which were all answered.  I will see him in the clinic after the above investigations are back.      Thanks for the consult.      Total face-to-face time spent 45 minutes, more than 50% of the time spent in counseling and coordination of care.         OTONIEL CULP MD             D: 2019   T: 2019   MT: AS      Name:     JESUS KHAN   MRN:      [id number]        Account:      [id number]   :      1945           Visit Date:   2019      Document: [id number]       cc: Meghana Oconnor MD

## 2019-04-03 ENCOUNTER — ONCOLOGY VISIT (OUTPATIENT)
Dept: ONCOLOGY | Facility: CLINIC | Age: 74
End: 2019-04-03
Attending: INTERNAL MEDICINE
Payer: MEDICARE

## 2019-04-03 VITALS
HEIGHT: 72 IN | WEIGHT: 208.8 LBS | OXYGEN SATURATION: 97 % | HEART RATE: 80 BPM | DIASTOLIC BLOOD PRESSURE: 77 MMHG | SYSTOLIC BLOOD PRESSURE: 129 MMHG | BODY MASS INDEX: 28.28 KG/M2 | TEMPERATURE: 97.9 F

## 2019-04-03 DIAGNOSIS — R76.8 ELEVATED IMMUNOGLOBULIN A: Primary | ICD-10-CM

## 2019-04-03 PROCEDURE — G0463 HOSPITAL OUTPT CLINIC VISIT: HCPCS

## 2019-04-03 PROCEDURE — 99212 OFFICE O/P EST SF 10 MIN: CPT | Performed by: INTERNAL MEDICINE

## 2019-04-03 ASSESSMENT — PAIN SCALES - GENERAL: PAINLEVEL: NO PAIN (0)

## 2019-04-03 ASSESSMENT — MIFFLIN-ST. JEOR: SCORE: 1730.11

## 2019-04-03 NOTE — PROGRESS NOTES
Oncology Rooming Note    April 3, 2019 8:50 AM   Matthew Platt is a 73 year old male who presents for:    Chief Complaint   Patient presents with     Oncology Clinic Visit     Initial Vitals: /77 (BP Location: Left arm, Patient Position: Chair, Cuff Size: Adult Large)   Pulse 80   Temp 97.9  F (36.6  C) (Oral)   Ht 1.829 m (6')   Wt 94.7 kg (208 lb 12.8 oz)   SpO2 97%   BMI 28.32 kg/m   Estimated body mass index is 28.32 kg/m  as calculated from the following:    Height as of this encounter: 1.829 m (6').    Weight as of this encounter: 94.7 kg (208 lb 12.8 oz). Body surface area is 2.19 meters squared.  No Pain (0) Comment: Data Unavailable   No LMP for male patient.  Allergies reviewed: Yes  Medications reviewed: Yes    Medications: Medication refills not needed today.  Pharmacy name entered into [employer]: [facility] [id number] [place] [address]    Clinical concerns:  doctor was notified.      Nancy Monroy, APOLLO

## 2019-05-31 ENCOUNTER — OFFICE VISIT - HEALTHEAST (OUTPATIENT)
Dept: INTERNAL MEDICINE | Facility: CLINIC | Age: 74
End: 2019-05-31

## 2019-05-31 DIAGNOSIS — J02.0 STREPTOCOCCAL SORE THROAT: ICD-10-CM

## 2019-05-31 DIAGNOSIS — J02.9 SORE THROAT: ICD-10-CM

## 2019-05-31 LAB — DEPRECATED S PYO AG THROAT QL EIA: NORMAL

## 2019-06-01 LAB — GROUP A STREP BY PCR: NORMAL

## 2019-06-05 ENCOUNTER — TELEPHONE (OUTPATIENT)
Dept: OTOLARYNGOLOGY | Facility: CLINIC | Age: 74
End: 2019-06-05

## 2019-06-05 NOTE — TELEPHONE ENCOUNTER
LVM with patient letting him know Date/Time of F/U with Dr. Oconnor.  Left my direct line in case that doesn't work or he needs to reschedule.

## 2019-06-17 ENCOUNTER — OFFICE VISIT (OUTPATIENT)
Dept: OTOLARYNGOLOGY | Facility: CLINIC | Age: 74
End: 2019-06-17
Payer: MEDICARE

## 2019-06-17 ENCOUNTER — RECORDS - HEALTHEAST (OUTPATIENT)
Dept: ADMINISTRATIVE | Facility: OTHER | Age: 74
End: 2019-06-17

## 2019-06-17 VITALS
HEART RATE: 66 BPM | SYSTOLIC BLOOD PRESSURE: 138 MMHG | DIASTOLIC BLOOD PRESSURE: 70 MMHG | HEIGHT: 71 IN | TEMPERATURE: 98.7 F | BODY MASS INDEX: 27.3 KG/M2 | WEIGHT: 195 LBS | RESPIRATION RATE: 18 BRPM

## 2019-06-17 DIAGNOSIS — J32.4 CHRONIC PANSINUSITIS: Primary | ICD-10-CM

## 2019-06-17 LAB
GRAM STN SPEC: ABNORMAL
GRAM STN SPEC: ABNORMAL
Lab: ABNORMAL
SPECIMEN SOURCE: ABNORMAL

## 2019-06-17 PROCEDURE — 87077 CULTURE AEROBIC IDENTIFY: CPT | Performed by: OTOLARYNGOLOGY

## 2019-06-17 PROCEDURE — 87185 SC STD ENZYME DETCJ PER NZM: CPT | Performed by: OTOLARYNGOLOGY

## 2019-06-17 PROCEDURE — 87070 CULTURE OTHR SPECIMN AEROBIC: CPT | Performed by: OTOLARYNGOLOGY

## 2019-06-17 PROCEDURE — 87205 SMEAR GRAM STAIN: CPT | Performed by: OTOLARYNGOLOGY

## 2019-06-17 ASSESSMENT — PAIN SCALES - GENERAL: PAINLEVEL: NO PAIN (0)

## 2019-06-17 ASSESSMENT — MIFFLIN-ST. JEOR: SCORE: 1649.51

## 2019-06-17 NOTE — PROGRESS NOTES
Mr. Platt returns. He is about the same.  Has seen an endodontist, report in EMR, who feels his teeth are not causing ongoing sinus infection. He continues to have PND and cough. Has been treated medically and surgically in past. Recent labs showed elevated IGG and IgM - saw hematology and no concerning issues were identified.     ROS: no wheeze, SOB.     Exam: alert, no distress, appears well.   External nose normal.    Procedure:  Endoscopy indicated for exam for evidence of infection  Topical anesthetic/decongestant spray applied.  Rigid scope used for visualization.  Findings: mucous in R antrum, culture sent.     A/P: CRS ongoing issues.     1. Culture sent today of R maxillary sinus  2. Directed antibiotics  3. If no improvement. Pull tooth vs sinus surgery.

## 2019-06-17 NOTE — LETTER
6/17/2019       RE: Matthew Platt  [address]  [facility] [address]     Dear Colleague,    Thank you for referring your patient, Matthew Platt, to the [facility] at [facility]. Please see a copy of my visit note below.    Mr. Platt returns. He is about the same.  Has seen an endodontist, report in EMR, who feels his teeth are not causing ongoing sinus infection. He continues to have PND and cough. Has been treated medically and surgically in past. Recent labs showed elevated IGG and IgM - saw hematology and no concerning issues were identified.     ROS: no wheeze, SOB.     Exam: alert, no distress, appears well.   External nose normal.    Procedure:  Endoscopy indicated for exam for evidence of infection  Topical anesthetic/decongestant spray applied.  Rigid scope used for visualization.  Findings: mucous in R antrum, culture sent.     A/P: CRS ongoing issues.     1. Culture sent today of R maxillary sinus  2. Directed antibiotics  3. If no improvement. Pull tooth vs sinus surgery.    Again, thank you for allowing me to participate in the care of your patient.      Sincerely,    Meghana Oconnor MD

## 2019-06-17 NOTE — PATIENT INSTRUCTIONS
Thank you for choosing [facility] today.    Please follow-up with  as needed.   Dr. Oconnor recommends if no improvement, please consider pulling the tooth.       If you have any further questions or concerns, call us at [phone].

## 2019-06-17 NOTE — NURSING NOTE
"Chief Complaint   Patient presents with     RECHECK     folllow up after CT -chronic sinusitis     Blood pressure 138/70, pulse 66, temperature 98.7  F (37.1  C), resp. rate 18, height 1.8 m (5' 10.87\"), weight 88.5 kg (195 lb).    Alexey Garrison LPN    "

## 2019-06-20 LAB
BACTERIA SPEC CULT: ABNORMAL
Lab: ABNORMAL
SPECIMEN SOURCE: ABNORMAL

## 2019-06-26 ENCOUNTER — MYC MEDICAL ADVICE (OUTPATIENT)
Dept: OTOLARYNGOLOGY | Facility: CLINIC | Age: 74
End: 2019-06-26

## 2019-06-30 NOTE — RESULT ENCOUNTER NOTE
Culture shows light growth of bacteria - we can treat this with Augmentin 875 BID for 14 days.   Please contact the patient.    Meghana Oconnor MD

## 2019-07-01 ENCOUNTER — TELEPHONE (OUTPATIENT)
Dept: OTOLARYNGOLOGY | Facility: CLINIC | Age: 74
End: 2019-07-01

## 2019-07-01 DIAGNOSIS — J32.4 CHRONIC PANSINUSITIS: Primary | ICD-10-CM

## 2019-07-01 NOTE — TELEPHONE ENCOUNTER
Left message for patient about sinus culture results. Gave instructions for Augmentin and call back number if patient has any further questions.

## 2019-09-20 ENCOUNTER — MYC MEDICAL ADVICE (OUTPATIENT)
Dept: OTOLARYNGOLOGY | Facility: CLINIC | Age: 74
End: 2019-09-20

## 2019-09-27 NOTE — TELEPHONE ENCOUNTER
Spoke to patient per response from  to get patient scheduled to either see  or  if he would like to wait to see her. Explained to the patient that  scheduled was pretty full up until jan, which is when her first available return is. Did explain that  expressed she would be ok with pt seeing  if he wished. Pt expressed that he had history with . when this writer mentioned her availability wouldn't be until January, pt stated thanks for the phone call, and hung up. No appointment scheduled at this time.

## 2019-11-07 ENCOUNTER — HEALTH MAINTENANCE LETTER (OUTPATIENT)
Age: 74
End: 2019-11-07

## 2019-12-03 ENCOUNTER — OFFICE VISIT - HEALTHEAST (OUTPATIENT)
Dept: INTERNAL MEDICINE | Facility: CLINIC | Age: 74
End: 2019-12-03

## 2019-12-03 DIAGNOSIS — R52 BODY ACHES: ICD-10-CM

## 2019-12-03 DIAGNOSIS — R05.9 COUGH: ICD-10-CM

## 2019-12-03 LAB
FLUAV AG SPEC QL IA: NORMAL
FLUBV AG SPEC QL IA: NORMAL

## 2019-12-03 ASSESSMENT — MIFFLIN-ST. JEOR: SCORE: 1656.38

## 2020-02-23 ENCOUNTER — HEALTH MAINTENANCE LETTER (OUTPATIENT)
Age: 75
End: 2020-02-23

## 2020-08-16 ENCOUNTER — RECORDS - HEALTHEAST (OUTPATIENT)
Dept: ADMINISTRATIVE | Facility: OTHER | Age: 75
End: 2020-08-16

## 2020-08-25 ENCOUNTER — RECORDS - HEALTHEAST (OUTPATIENT)
Dept: ADMINISTRATIVE | Facility: OTHER | Age: 75
End: 2020-08-25

## 2020-09-02 ENCOUNTER — RECORDS - HEALTHEAST (OUTPATIENT)
Dept: ADMINISTRATIVE | Facility: OTHER | Age: 75
End: 2020-09-02

## 2020-12-06 ENCOUNTER — HEALTH MAINTENANCE LETTER (OUTPATIENT)
Age: 75
End: 2020-12-06

## 2021-04-10 ENCOUNTER — HEALTH MAINTENANCE LETTER (OUTPATIENT)
Age: 76
End: 2021-04-10

## 2021-05-29 NOTE — PROGRESS NOTES
[facility] Follow Up Note    Matthew Platt   73 y.o. male    Date of Visit: 5/31/2019    Chief Complaint   Patient presents with     Sore Throat     sx for more than 1 week, swallowing difficult     Subjective  This is a 73-year-old man who was a patient of Dr. Nj Laureano.  He comes in today because of a persistent sore throat.  It started about 1 week ago.  He reports that it feels a deep in the throat and that his throat he gets quite dry.  When it does this he has a little trouble swallowing but once he is taking in some fluids that resolves.  He is also had a mild cough.  He denies any fever or chills.  He does have a history of sinusitis but does not seem to be related to that problem.  He reports that the symptoms have not changed significantly over the course of the week.  There is certainly been no improvement.    ROS A comprehensive review of systems was performed and was otherwise negative    Medications, allergies, and problem list were reviewed and updated    Exam  General Appearance:   On examination his blood pressure is 126/62.  Weight is 196 pounds.  Temperature is 98.1 degrees and O2 saturation is 99%.  BMI is 27.73.    Heart is in a sinus rhythm with a rate of 70 and no ectopy.    No facial tenderness.    No enlarged cervical lymph nodes.    Both ear canals are normal with normal tympanic membranes.  He does still have a tube in his left ear.  Throat shows some erythema but no exudate.    Lungs are clear.    The patient is alert and oriented x3.      Assessment/Plan  1. Streptococcal sore throat  Rapid Strep A Screen-Throat    Group A Strep, RNA Direct Detection, Throat   2. Sore throat  azithromycin (ZITHROMAX Z-YINKA) 250 MG tablet     Persistent sore throat.  Examination is unremarkable but given the situation I think it might be worthwhile putting him on a Z-Yinka as the symptoms have not changed in a week.  He will try that and follow-up next week if he is not  improving.    As an additional note, he was seen earlier this winter with some chest wall type of pain.  I did see him at that time.  He was about to go on a golf trip to [place] which he did.  He reported one incident of some dyspnea with exertion while hiking in [place] but had no chest pain.  He reported this to Dr. Laureano upon his return and I have reviewed the telephone exchanges.  Dr. Laureano recommended a stress echo test.  There is no record that this was completed.  I discussed this with the patient and he said that after talking with Dr. Laureano his symptoms pretty much cleared immediately and have not recurred.  He has no issues with any activity or while golfing.  He does not seem interested in pursuing any additional work-up at this time since he is asymptomatic.  I did advise him that should there be any recurrence of the symptoms he should let us know immediately and we could proceed with the work-up as recommended.  Body Mass Index was not assessed due to Patient was in with an acute medical issue..    Bernardino Vergara MD      Current Outpatient Medications on File Prior to Visit   Medication Sig     folic acid/multivit-min/lutein (CENTRUM SILVER ORAL) Take 1 tablet by mouth.     vit C/vit E ac/lut/copper/zinc (PRESERVISION LUTEIN ORAL) Take 1 tablet by mouth.     No current facility-administered medications on file prior to visit.      Allergies   Allergen Reactions     Lipitor [Atorvastatin] Myalgia     Social History     Tobacco Use     Smoking status: Former Smoker     Last attempt to quit: 1974     Years since quittin.4     Smokeless tobacco: Never Used   Substance Use Topics     Alcohol use: Yes     Alcohol/week: 3.0 oz     Types: 5 Standard drinks or equivalent per week     Drug use: No

## 2021-05-30 VITALS — HEIGHT: 71 IN | WEIGHT: 210 LBS | BODY MASS INDEX: 29.4 KG/M2

## 2021-05-30 VITALS — WEIGHT: 208 LBS | HEIGHT: 71 IN | BODY MASS INDEX: 29.12 KG/M2

## 2021-05-30 VITALS — WEIGHT: 209 LBS | HEIGHT: 71 IN | BODY MASS INDEX: 29.26 KG/M2

## 2021-05-31 VITALS — BODY MASS INDEX: 30.52 KG/M2 | WEIGHT: 218 LBS | HEIGHT: 71 IN

## 2021-06-02 VITALS — BODY MASS INDEX: 30.1 KG/M2 | HEIGHT: 71 IN | WEIGHT: 215 LBS

## 2021-06-02 VITALS — WEIGHT: 196 LBS | BODY MASS INDEX: 26.58 KG/M2

## 2021-06-03 VITALS
OXYGEN SATURATION: 97 % | SYSTOLIC BLOOD PRESSURE: 138 MMHG | WEIGHT: 200 LBS | BODY MASS INDEX: 28 KG/M2 | DIASTOLIC BLOOD PRESSURE: 64 MMHG | HEIGHT: 71 IN | HEART RATE: 95 BPM

## 2021-06-03 NOTE — PROGRESS NOTES
"  Office Visit - Follow Up   Matthew Platt   74 y.o. male    Date of Visit: 12/3/2019    Chief Complaint   Patient presents with     Cough     Generalized Body Aches        Assessment and Plan   1. Body aches  His influenza is negative.  He did get a flu shot this year.  I reviewed his chest x-ray and I would like to see the official radiology read, a little bit concerned about the right middle or lower lobe possibly having an infiltrate.  I called and discussed with patient and we will determine antibiotics tomorrow morning.  Otherwise this would be consistent with a viral infection.  - Influenza A/B Rapid Test- Nasal Swab  - XR Chest 2 Views    2. Cough  - XR Chest 2 Views    Return in about 2 weeks (around 12/17/2019) for recheck.     History of Present Illness   This 74 y.o. old man comes in for 4 days of acute onset of body aches, chills and cough.  He has history of recurrent sinusitis and has not had any specific sinus issues or change in sinus issues.  No sore throat.  No nausea or vomiting.  No diarrhea.  No neck stiffness confusion lightheadedness dizziness or significant breathing difficulty.  No hemoptysis.    Review of Systems: A comprehensive review of systems was negative except as noted.     Medications, Allergies and Problem List   Reviewed, reconciled and updated  Post Discharge Medication Reconciliation Status:      Physical Exam   General Appearance:   No acute distress    /64 (Patient Site: Right Arm, Patient Position: Sitting, Cuff Size: Adult Regular)   Pulse 95   Ht 5' 10.5\" (1.791 m)   Wt 200 lb (90.7 kg)   SpO2 97%   BMI 28.29 kg/m      Clear to auscultation bilaterally     Additional Information   Current Outpatient Medications   Medication Sig Dispense Refill     folic acid/multivit-min/lutein (CENTRUM SILVER ORAL) Take 1 tablet by mouth.       vit C/vit E ac/lut/copper/zinc (PRESERVISION LUTEIN ORAL) Take 1 tablet by mouth.       No current facility-administered " medications for this visit.      Allergies   Allergen Reactions     Lipitor [Atorvastatin] Myalgia     Social History     Tobacco Use     Smoking status: Former Smoker     Last attempt to quit: 1974     Years since quittin.9     Smokeless tobacco: Never Used   Substance Use Topics     Alcohol use: Yes     Alcohol/week: 5.0 standard drinks     Types: 5 Standard drinks or equivalent per week     Drug use: No       Review and/or order of clinical lab tests:  Review and/or order of radiology tests:  Review and/or order of medicine tests:  Discussion of test results with performing physician:  Decision to obtain old records and/or obtain history from someone other than the patient:  Review and summarization of old records and/or obtaining history from someone other than the patient and.or discussion of case with another health care provider:  Independent visualization of image, tracing or specimen itself:    Time:      Bernardino Schwartz MD

## 2021-06-09 NOTE — PROGRESS NOTES
Preoperative Consultation   Matthew Platt   71 y.o.  male    Date of visit: 4/5/2017  Physician: Bernardino Schwartz MD    This is a preoperative consultation requested by Dr. Cunningham in preparation for blepharoplasty on 4/6/2017 at [facility], fax [phone].       Assessment and Plan   Matthew Platt was seen in preoperative consultation in preparation for blepharoplasty.  This is a low risk surgery and the patient has no increased risk for major cardiac complications.  Please note patient took his last dose of aspirin on 4/5/2017.  Additionally, he has struggled with a cough likely chronic sinusitis some wheezing on exam today which is stable.    1. Preoperative examination    2. 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    Daughter Emma    Son Juan-1974    3 grandchildren        Past Medical History   Patient Active Problem List   Diagnosis     Chronic maxillary sinusitis     Allergic rhinitis       Past Surgical History  He has a past surgical history that includes Vasectomy; Tonsillectomy; and Dupuytren contracture release (Left).     History of Present Illness   This 71 y.o. old man comes in for preoperative evaluation.  He is having some vision problems from S[place] eyelids.  Otherwise feeling generally okay.  He has had a cough for the past year or so.  Extensive evaluation, likely chronic sinusitis.  Cough is much better than it has been.  He is seeing ENT and allergy.  He has had spirometry which was normal.  He has been on reflux medication which has not been helpful.  He has had multiple courses of antibiotics for sinusitis.  No chest pain or shortness of breath.  No current fever chills or night sweats.  Breathing stable exercise tolerance okay.    Recent antiplatelet use:  "yes last dose of aspirin 4/5/17  Personal or family history of bleeding or clotting disorders: no  Steroid use in the past year: yes three courses of prednisone for cough past year  Personal or family history of difficulty with anesthesia: no  Current cardiopulmonary symptoms: no    Review of Systems: A comprehensive review of systems was negative except as noted.     Medications and Allergies   Current Outpatient Prescriptions   Medication Sig Dispense Refill     aspirin 81 MG EC tablet Take 81 mg by mouth daily.       No current facility-administered medications for this visit.      Allergies   Allergen Reactions     Lipitor [Atorvastatin] Myalgia        Family and Social History   Family History   Problem Relation Age of Onset     Pancreatic cancer Father 87     Hepatitis Mother      No Medical Problems Sister      No Medical Problems Daughter      No Medical Problems Son         Social History   Substance Use Topics     Smoking status: Former Smoker     Quit date: 1974     Smokeless tobacco: None     Alcohol use Yes        Physical Exam   General Appearance:   No acute distress    /70  Pulse 74  Ht 5' 10.5\" (1.791 m)  Wt 208 lb (94.3 kg)  SpO2 98%  BMI 29.42 kg/m2    EYES: Eyelids, conjunctiva, and sclera were normal. Pupils were normal. Cornea, iris, and lens were normal bilaterally.  HEAD, EARS, NOSE, MOUTH, AND THROAT: Head and face were normal. Hearing was normal to voice and the ears were normal to external exam. Nose appearance was normal and there was no discharge. Oropharynx was normal.  NECK: Neck appearance was normal. There were no neck masses and the thyroid was not enlarged.  RESPIRATORY: Breathing pattern was normal and the chest moved symmetrically.  Percussion/auscultatory percussion was normal.  Lung sounds menstruated scattered rhonchi and expiratory wheezing.  CARDIOVASCULAR: Heart rate and rhythm were normal.  S1 and S2 were normal and there were no extra sounds or murmurs. " Peripheral pulses in arms and legs were normal.  Jugular venous pressure was normal.  There was no peripheral edema.  GASTROINTESTINAL: The abdomen was normal in contour.  Bowel sounds were present.  Percussion detected no organ enlargement or tenderness.  Palpation detected no tenderness, mass, or enlarged organs.   MUSCULOSKELETAL: Skeletal configuration was normal and muscle mass was normal for age. Joint appearance was overall normal.  LYMPHATIC: There were no enlarged nodes.  SKIN/HAIR/NAILS: Skin color was normal.  There were no skin lesions.  Hair and nails were normal.  NEUROLOGIC: The patient was alert and oriented to person, place, time, and circumstance. Speech was normal. Cranial nerves were normal. Motor strength was normal for age. The patient was normally coordinated.  PSYCHIATRIC:  Mood and affect were normal and the patient had normal recent and remote memory. The patient's judgment and insight were normal.     Additional Tests   Laboratory: Reviewed labs including normal hemogram, normal basic metabolic panel, elevated sed rate was normal Anka    Radiology: We reviewed his chest x-ray and spirometry, normal    Electrocardiogram: 11/2016, NSR, personally reviewed    Total time spent with the patient today was 40 minutes of which > 50% was spent in counseling and coordination of care     Bernardino Schwartz MD  Internal Medicine  Contact me at [phone]

## 2021-06-09 NOTE — PROGRESS NOTES
[facility] Follow Up Note    Matthew Platt   71 y.o. male    Date of Visit: 3/13/2017    Chief Complaint   Patient presents with     Fall     almost passed out on walk today, EMT did come to get him     Cough     Subjective  This is a 71-year-old patient of Dr. Nj Laureano.  He comes in following a near syncopal episode this morning.  But his ongoing problems seems a little more complex.  The issue at this morning was that he had taken his dog for a walk prior to breakfast.  While walking he felt some weakness and nausea.  He ultimately knelt down on the ground but then pitched forward scraping his upper lip.  He was laying on the ground when someone passing by stopping to check him and the paramedics were phoned.  They did check him out and found nothing unusual.  They did an EKG which she brought along and looks normal.  He tells me he has not felt very well the past couple of days.  He does have a prolonged history of a cough that he says goes back one year.  He has had intermittent symptoms of respiratory infections since then.  On a physical a few months ago his sed rate was high at 70.  He has been seen in consultation by the allergist who found no allergies.  He has been seen by the ear nose and throat doctors.  They did not find anything unusual in the sinuses and wondered if his cough might be due to reflux.  They started him on Zantac which so far has not done much.  His wife is here with him today and expressing both of their frustrations over the inability to solve this problem.    ROS A comprehensive review of systems was performed and was otherwise negative    Medications, allergies, and problem list were reviewed and updated    Exam  General Appearance:   On examination his blood pressure is 132/60.  Weight is 210 pounds and height is 70.5 inches.  BMI is 29.71.    No facial tenderness and no enlarged cervical lymph nodes.    Lungs are clear.    Heart is in a sinus  rhythm with a rate of 78 and no ectopy.    The patient is alert and oriented ×3.    On the right side of his upper lip is a small abrasion that appears clean and not draining.      Assessment/Plan  1. Cough  HM1(CBC and Differential)    HM1 (CBC with Diff)   2. Fall     3. Chronic maxillary sinusitis  Erythrocyte Sedimentation Rate     This morning's fall may have been a vasovagal response or may have been related to some dehydration.  He does not appear to have suffered any significant injury and I would not do additional workup at this time.    I am not sure what to think the chronic sinus symptoms as evaluation by ENT has been negative.    Relative to the cough, it is certainly possible that this is reflux oriented.  We discussed this at length and have decided to try him on some omeprazole 20 mg daily for the next 2 weeks.  He may need an EGD.  He will see what the 2 weeks of omeprazole doesn't follow-up with his physician at that time.    Total time of this office visit was 25 minutes with greater than 50% of the time spent in care coordination and patient counseling.  Body Mass Index was not assessed due to The patient was in with acute medical issues..    Bernardino Vergara MD      Current Outpatient Prescriptions on File Prior to Visit   Medication Sig     aspirin 81 MG EC tablet Take 81 mg by mouth daily.     celecoxib (CELEBREX) 200 MG capsule TAKE 1 CAPSULE BY MOUTH DAILY.     fluticasone (FLONASE) 50 mcg/actuation nasal spray 2 sprays into each nostril 2 (two) times a day.     fluticasone-salmeterol (ADVAIR) 500-50 mcg/dose DISKUS Inhale 1 puff 2 (two) times a day.     No current facility-administered medications on file prior to visit.      Allergies   Allergen Reactions     Lipitor [Atorvastatin] Myalgia     Social History   Substance Use Topics     Smoking status: Former Smoker     Quit date: 1974     Smokeless tobacco: None     Alcohol use Yes      Comment: Nightly wine

## 2021-06-10 NOTE — PROGRESS NOTES
Preoperative Consultation   Matthew Platt   71 y.o.  male    Date of visit: 4/26/2017  Physician: Lorenzo Campuzano MD    This is a preoperative consultation requested by Dr. Powers in preparation for bilateral cataract surgery on 5/2/17 for the left eye and 5/23/17 for the right eye at [facility] fax [phone].       Assessment and Plan   Matthew Platt was seen in preoperative consultation in preparation for bilateral cataract surgery with IOLs.  This is a low risk surgery and the patient has no increased risk for major cardiac complications. he is cleared for surgery in an ambulatory setting where patient will receive conscious sedation.    1. Maxillary sinusitis, chronic    2. 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    Daughter Emma    Son Juan-1974    3 grandchildren        Past Medical History   Patient Active Problem List   Diagnosis     Chronic maxillary sinusitis     Allergic rhinitis       Past Surgical History  He has a past surgical history that includes Vasectomy; Tonsillectomy; and Dupuytren contracture release (Left). bilateral eyelid surgery      History of Present Illness   This 71 y.o. old comes in for preop evaluation prior to undergoing bilateral cataract surgeries as listed above.  His medical problems are few and minor.  He has chronic allergic rhinitis and some chronic maxillary sinusitis.  He is not on any real medications for these issues.  He has underlying hyperlipidemia and is on atorvastatin.  Is a history of colonic polyps treated with colonoscopy.  Past surgeries are as listed above.  He was given a pneumococcal vaccine today.    Recent antiplatelet use: no  Personal or family history of bleeding or clotting disorders: no  Steroid use in the past  "year: no  Personal or family history of difficulty with anesthesia: no  Current cardiopulmonary symptoms: no      Review of Systems: A comprehensive review of systems was negative except as noted.     Medications and Allergies   Current Outpatient Prescriptions   Medication Sig Dispense Refill     aspirin 81 MG EC tablet Take 81 mg by mouth daily.       No current facility-administered medications for this visit.      Allergies   Allergen Reactions     Lipitor [Atorvastatin] Myalgia        Family and Social History   Family History   Problem Relation Age of Onset     Pancreatic cancer Father 87     Hepatitis Mother      No Medical Problems Sister      No Medical Problems Daughter      No Medical Problems Son         Social History   Substance Use Topics     Smoking status: Former Smoker     Quit date: 1974     Smokeless tobacco: None     Alcohol use Yes        Physical Exam   General Appearance:       /62 (Patient Site: Right Arm, Patient Position: Sitting)  Pulse 72  Ht 5' 10.5\" (1.791 m)  Wt 209 lb (94.8 kg)  SpO2 97%  BMI 29.56 kg/m2    EYES: Eyelids, conjunctiva, and sclera were normal. Pupils were normal.   HEAD, EARS, NOSE, MOUTH, AND THROAT: Head and face were normal. Hearing was normal to voice and the ears were normal to external exam. Nose appearance was normal and there was no discharge. Oropharynx was normal.  NECK: Neck appearance was normal. There were no neck masses and the thyroid was not enlarged.  RESPIRATORY: Breathing pattern was normal and the chest moved symmetrically.  Percussion/auscultatory percussion was normal.  Lung sounds were normal and there were no abnormal sounds.  CARDIOVASCULAR: Heart rate and rhythm were normal.  S1 and S2 were normal and there were no extra sounds or murmurs. Peripheral pulses in arms and legs were normal.  Jugular venous pressure was normal.  There was no peripheral edema.  GASTROINTESTINAL: The abdomen was normal in contour.  Bowel sounds were " present.  Percussion detected no organ enlargement or tenderness.  Palpation detected no tenderness, mass, or enlarged organs.   MUSCULOSKELETAL: Skeletal configuration was normal and muscle mass was normal for age. Joint appearance was overall normal.  LYMPHATIC: There were no enlarged nodes.  SKIN/HAIR/NAILS: Skin color was normal.  There were no skin lesions.  Hair and nails were normal.  NEUROLOGIC: The patient was alert and oriented to person, place, time, and circumstance. Speech was normal. Cranial nerves were normal. Motor strength was normal for age. The patient was normally coordinated.  PSYCHIATRIC:  Mood and affect were normal and the patient had normal recent and remote memory. The patient's judgment and insight were normal.         Additional Tests   Laboratory:   Recent Results (from the past 24 hour(s))   HM2(CBC w/o Differential)    Collection Time: 04/26/17  3:58 PM   Result Value Ref Range    WBC 6.4 4.0 - 11.0 thou/uL    RBC 4.51 4.40 - 6.20 mill/uL    Hemoglobin 13.9 (L) 14.0 - 18.0 g/dL    Hematocrit 41.2 40.0 - 54.0 %    MCV 91 80 - 100 fL    MCH 30.9 27.0 - 34.0 pg    MCHC 33.8 32.0 - 36.0 g/dL    RDW 12.2 11.0 - 14.5 %    Platelets 337 140 - 440 thou/uL    MPV 6.8 (L) 7.0 - 10.0 fL         Radiology:     Electrocardiogram:     Total time spent with the patient today was 40 minutes of which > 50% was spent in counseling and coordination of care     Lorenzo Campuzano MD  Internal Medicine  Contact me at [phone]

## 2021-06-12 NOTE — PROGRESS NOTES
Assessment:   The encounter diagnosis was Cough.     Plan:   No medications were ordered this encounter    Patient Instructions     Based on the information provided, I would recommend you make an appointment with your primary care to discuss these symptoms. Please schedule an appointment.    You will not be charged for this eVisit.    Return for further follow up if needed. Call [phone]) or schedule an appointment via [facility]..    Subjective:   Matthew Platt is a 71 y.o. male who submitted an eVisit request for evaluation of his Cough.  See the questionnaire and message section of encounter report for information related to history of present illness and review of systems.    The following portions of the patient's history were reviewed and updated as appropriate:  He  does not have any pertinent problems on file.  He is allergic to lipitor [atorvastatin]..     Objective:   No exam performed today, patient submitted as eVisit.

## 2021-06-16 NOTE — PROGRESS NOTES
Preoperative Exam    Scheduled Procedure: Nasal/Sinus Endoscopy with Maxillary Antrostomy  Surgery Date:  02/14/18  Surgery Location: [facility]    Surgeon:  Dr Pratt    Assessment/Plan:     1. Chronic sinus infection  Failed multiple courses of conservative therapy.  To undergo septoplasty/sinus drainage procedure-maxillary sinuses    2. Nasal septal deviation  Clinical septoplasty    3. Preop examination  Unremarkable  - HM1(CBC and Differential)  - Basic Metabolic Panel  - Electrocardiogram Perform - Clinic  - 1 (CBC with Diff)    4. Obstructive sleep apnea hypopnea, severe  On home CPAP    Surgical Procedure Risk: Low (reported cardiac risk generally < 1%)  Have you had prior anesthesia?: Yes  Have you or any family members had a previous anesthesia reaction:  No  Do you or any family members have a history of a clotting or bleeding disorder?: No  Cardiac Risk Assessment: no increased risk for major cardiac complications    Patient approved for surgery with general or local anesthesia.    Please Note:  Patient uses a CPAP machine.    Functional Status: Independent  Patient plans to recover at home with family.         Nj Laureano MD  Internal medicine  [facility]  [phone]  [email or web address]         Subjective:      Matthew Platt is a 72 y.o. male who presents for a preoperative consultation.  He will be having surgery for deviated septum/chronic sinusitis [facility]'s 2/14/2018  Is failed multiple courses of antibiotic, inhaled and oral corticosteroid    He is a healthy man  He does have sleep apnea  He has been on nasal pillows/CPAP at 12 cm since August  He has calmer sleep.  His snoring has decreased.    No cardiovascular symptomatology    All other systems reviewed and are negative, other than those listed in the HPI.    Appetite is good  Bowels are regular    Pertinent History  Do you have difficulty breathing or chest pain after walking up a flight  of stairs: No  History of obstructive sleep apnea: Yes: On home CPAP-nasal pillow  Steroid use in the last 6 months: Yes: Yes-steroid burst for chronic sinusitis  Frequent Aspirin/NSAID use: No  Prior Blood Transfusion: No  Prior Blood Transfusion Reaction: No  If for some reason prior to, during or after the procedure, if it is medically indicated, would you be willing to have a blood transfusion?:  There is no transfusion refusal.    No current outpatient prescriptions on file.     No current facility-administered medications for this visit.         Allergies   Allergen Reactions     Lipitor [Atorvastatin] Myalgia       Patient Active Problem List   Diagnosis     Chronic maxillary sinusitis     Allergic rhinitis       Past Medical History:   Diagnosis Date     Allergic rhinitis 8/15/2016     Chronic maxillary sinusitis 8/15/2016     Gilbert's syndrome      Hx of adenomatous colonic polyps 5/12,5/07     Hyperlipidemia      KENNEDY (obstructive sleep apnea) 08/2017     Osteoarthritis     L4-L5     Retinal detachment     Right-laser therapy       Past Surgical History:   Procedure Laterality Date     DUPUYTREN CONTRACTURE RELEASE Left     4th finger     TONSILLECTOMY       VASECTOMY         Social History     Social History     Marital status:      Spouse name: N/A     Number of children: N/A     Years of education: N/A     Occupational History     Not on file.     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    Daughter Emma    Son Timothy-1974    3 grandchildren       Patient Care Team:  Nj Laureano MD as PCP - General (Internal Medicine)  Dr. Alston  "[facility]-        Objective:     Vitals:    02/12/18 1332   BP: 130/58   Pulse: 79   SpO2: 98%   Weight: 218 lb (98.9 kg)   Height: 5' 10.5\" (1.791 m)         Physical Exam:  Skin: Normal. No rash or lesion  Lymph Nodes: None palpable-including neck, axilla, inguinal, epitrochlear.  Head:  Normocephalic.    Eyes: Midline.  Equal size., full ROM.  External exams normal.  No icterus  Ears:  Normal pinnae, canals, and TM's.    Nose:-Per ENT  Throat: Voice is nasal moist mucous membranes without lesions, erythema, or exudate.    Neck: No palpable masses, lymphadenopathy or tenderness.No thyromegaly or goiter.  No thyroid nodule.  Carotid Arteries:  No Bruit.  Carotid upstroke normal  Chest Wall: No deformity or pain elicited on compression.  Respiratory:  Normal respiratory effort.  Lungs are clear with good breath sounds.  No dullness.  No wheezing.  Heart: Regular rhythm.  Normal sounding S1, S2 without S3, S4, murmurs, rubs, or gallops.    Abdomen:  The abdomen was flat, soft and nontender without guarding rebound or masses.  There are normal bowel sounds.  There is no hepatosplenomegaly.  There is no palpable enlargement of the aorta.  Benign abdomen  Extremities:  Full ROM without limitation, deformity or edema.    4+ pulses  Neurologic-intact- No focal deficit.  Speech clear.  Coordination normal.  Strength symmetric  Orthopedic-no arthropathy.      There are no Patient Instructions on file for this visit.    EKG: Normal sinus rhythm, normal ECG         Labs:    Recent Results (from the past 24 hour(s))   1 (CBC with Diff)    Collection Time: 02/12/18  2:33 PM   Result Value Ref Range    WBC 10.0 4.0 - 11.0 thou/uL    RBC 4.67 4.40 - 6.20 mill/uL    Hemoglobin 14.5 14.0 - 18.0 g/dL    Hematocrit 44.0 40.0 - 54.0 %    MCV 94 80 - 100 fL    MCH 31.1 27.0 - 34.0 pg    MCHC 33.0 32.0 - 36.0 g/dL    RDW 12.4 11.0 - 14.5 %    Platelets 315 140 - 440 thou/uL    MPV 6.9 (L) 7.0 - 10.0 fL    Neutrophils % 62 50 - 70 % "    Lymphocytes % 26 20 - 40 %    Monocytes % 10 2 - 10 %    Eosinophils % 2 0 - 6 %    Basophils % 1 0 - 2 %    Neutrophils Absolute 6.2 2.0 - 7.7 thou/uL    Lymphocytes Absolute 2.6 0.8 - 4.4 thou/uL    Monocytes Absolute 1.0 (H) 0.0 - 0.9 thou/uL    Eosinophils Absolute 0.2 0.0 - 0.4 thou/uL    Basophils Absolute 0.1 0.0 - 0.2 thou/uL   Electrocardiogram Perform - Clinic    Collection Time: 02/12/18  3:28 PM   Result Value Ref Range    SYSTOLIC BLOOD PRESSURE  mmHg    DIASTOLIC BLOOD PRESSURE  mmHg    VENTRICULAR RATE 69 BPM    ATRIAL RATE 69 BPM    P-R INTERVAL 168 ms    QRS DURATION 74 ms    Q-T INTERVAL 384 ms    QTC CALCULATION (BEZET) 411 ms    P Axis 65 degrees    R AXIS 82 degrees    T AXIS 54 degrees    MUSE DIAGNOSIS       Normal sinus rhythm  Normal ECG  When compared with ECG of 25-NOV-2016 13:55,  No significant change was found  Confirmed by JODY FUNEZ, LES LOC:JN ([id number]) on 2/12/2018 4:23:08 PM         Immunization History   Administered Date(s) Administered     Influenza, inj, historic,unspecified 09/14/2016     Pneumo Conj 13-V (2010&after) 11/25/2016     Pneumo Polysac 23-V 04/26/2017     ZOSTER 03/26/2013           Electronically signed by Nj Laureano MD 02/12/18 1:35 PM

## 2021-06-18 NOTE — LETTER
Letter by Nj Laureano MD at      Author: Nj Laureano MD Service: -- Author Type: --    Filed:  Encounter Date: 3/11/2019 Status: (Other)        [facility]  [address] [place] [address]  [facility] [address]  [phone]         Matthew Platt  [address] [facility] [address]        03/11/19    Dear Matthew Platt,     At [facility] we care about your health and well-being. Your primary care provider is committed to ensuring you receive high quality care and has chosen a network of specialists to assist in providing that care. Recently Dr. Laureano referred you to have an Echo Stress Test. You requested to call [facility] back when you were ready to schedule this test.       It is important to your overall health to follow through with the recommendation from your provider. Please call [facility] at [phone] to assistance in scheduling an appointment.  If you have already scheduled this appointment, please disregard this notice.  Thank you for choosing [facility] for your healthcare needs.       Sincerely,       Specialty Scheduling   [facility]  [phone]

## 2021-06-23 NOTE — PROGRESS NOTES
[facility] Follow Up Note    Matthew Platt   73 y.o. male    Date of Visit: 2/1/2019    Chief Complaint   Patient presents with     Pain     lower left rib cage, yesterday pain moved around to chest     Subjective  This is a 73-year-old man who is a patient of Dr. Nj Laureano.  He comes in today because of some left-sided chest wall type of pain.  The pain is on the left side of the rib cage.  It started last week after he had been doing some sheet rocking and taping.  He does not recall any specific injury or motion that triggered this.  The pain just began.  In the past couple of days there was some radiation up the left side of the chest.  He denies any shortness of breath or pleuritic type of chest pain.  The pain does not seem to be activity related.  He has had some relief with Advil but no significant relief by applying heat.  Yesterday he did have an episode of nausea after eating lunch.  Supper last evening and breakfast this morning were okay.  He does have a history of some reflux type of symptoms and uses Tums off and on.  He does not feel the pain is worsening but it is not made significant improvement.  He is otherwise feeling fine.  He wanted to come in today because he is leaving on Sunday for a golfing trip to [place].    ROS A comprehensive review of systems was performed and was otherwise negative    Medications, allergies, and problem list were reviewed and updated    Exam  General Appearance:   On examination his blood pressure is 132/66.  Weight is 215 pounds and height is 70.5 inches.  BMI is 30.41.    Neck is supple with no neck vein distention.    Lungs are clear with good breath sounds bilaterally.    No significant chest wall tenderness.    Heart is in a sinus rhythm with a rate of 80 and no ectopy.  No gallops or murmurs.    Abdomen is soft and nontender with no distention.    No peripheral edema.    The patient is alert and oriented  x3.      Assessment/Plan  1. Atypical chest pain  Electrocardiogram Perform - Clinic   2. Screen for colon cancer  Ambulatory referral for Colonoscopy     Atypical chest pain.  This does not sound like cardiac pain.  I do not think that yesterday's episode of nausea was related to the pain.  We did do an EKG which is normal.  My feeling is that this is most likely a musculoskeletal pain.  I did discuss this with him at length.  I see no reason he cannot go ahead with his planned golfing trip.  I advised him to use some Advil but make sure he takes it with food.  I advised him to put some cold packs on the area after golfing and to use a little heat otherwise.  He will follow-up with Dr. Laureano when he returns if he is not vastly improved.  He will also follow-up regarding his reflux type of symptoms.  Body Mass Index was not assessed due to Patient was in with an acute medical issue..    Bernardino Vergara MD      Current Outpatient Medications on File Prior to Visit   Medication Sig     folic acid/multivit-min/lutein (CENTRUM SILVER ORAL) Take 1 tablet by mouth.     vit C/vit E ac/lut/copper/zinc (PRESERVISION LUTEIN ORAL) Take 1 tablet by mouth.     No current facility-administered medications on file prior to visit.      Allergies   Allergen Reactions     Lipitor [Atorvastatin] Myalgia     Social History     Tobacco Use     Smoking status: Former Smoker     Last attempt to quit: 1974     Years since quittin.1     Smokeless tobacco: Never Used   Substance Use Topics     Alcohol use: Yes     Alcohol/week: 3.0 oz     Types: 5 Standard drinks or equivalent per week     Drug use: No

## 2021-06-23 NOTE — TELEPHONE ENCOUNTER
Dr. Laureano,  Please review ECG results from 02/01/19 and advise.  Thank you.  Dayan GARCIA CMA/MELANIE....................1:45 PM

## 2021-06-23 NOTE — TELEPHONE ENCOUNTER
The following message from Dr. Vergara has been relayed to the patient via my chart:Please tell him that the change is minor and probably of no concern.  It would tend to indicate that the initial heartbeat is coming from somewhere other than its usual location.  I would suggest that he plan to follow-up with Dr. Laureano later this week or next week to see if he has any additional recommendations.  This may very well be just a normal variant that is occurring. (Routing comment)     Asked the patient to contact us if he has any further questions.  Dayan GARCIA CMA/MELANIE....................3:33 PM

## 2021-06-23 NOTE — TELEPHONE ENCOUNTER
Dr. Vergara,  Please review ECG results and advise.  Thank you.  Dayan GARCIA, APOLLO/CMT....................1:47 PM

## 2021-06-23 NOTE — TELEPHONE ENCOUNTER
Dr. Laureano,  Please review the message below from the patient and advise.  Thank you.  Dayan GARCIA, CMA/CMT....................4:10 PM

## 2021-06-24 NOTE — TELEPHONE ENCOUNTER
I called and left message  I think with chest discomfort dyspnea in a 73-year-old man that coronary disease needs to be excluded    Needs stress echo stress test  I should see 2-3 weeks after.    Insert signature

## 2021-07-03 NOTE — ADDENDUM NOTE
Addendum Note by Chelsea Laureano MD at 2/13/2019  5:21 PM     Author: Chelsea Laureano MD Service: -- Author Type: Physician    Filed: 2/13/2019  5:21 PM Encounter Date: 2/11/2019 Status: Signed    : Chelsea Laureano MD (Physician)    Addended by: CHELSEA LAUREANO on: 2/13/2019 05:21 PM        Modules accepted: Orders

## 2021-09-15 ENCOUNTER — TRANSFERRED RECORDS (OUTPATIENT)
Dept: HEALTH INFORMATION MANAGEMENT | Facility: CLINIC | Age: 76
End: 2021-09-15

## 2021-09-21 ENCOUNTER — TRANSFERRED RECORDS (OUTPATIENT)
Dept: HEALTH INFORMATION MANAGEMENT | Facility: CLINIC | Age: 76
End: 2021-09-21

## 2021-09-25 ENCOUNTER — HEALTH MAINTENANCE LETTER (OUTPATIENT)
Age: 76
End: 2021-09-25

## 2021-09-29 ENCOUNTER — TRANSFERRED RECORDS (OUTPATIENT)
Dept: HEALTH INFORMATION MANAGEMENT | Facility: CLINIC | Age: 76
End: 2021-09-29

## 2021-11-10 SDOH — ECONOMIC STABILITY: FOOD INSECURITY: WITHIN THE PAST 12 MONTHS, YOU WORRIED THAT YOUR FOOD WOULD RUN OUT BEFORE YOU GOT MONEY TO BUY MORE.: NEVER TRUE

## 2021-11-10 SDOH — ECONOMIC STABILITY: TRANSPORTATION INSECURITY
IN THE PAST 12 MONTHS, HAS THE LACK OF TRANSPORTATION KEPT YOU FROM MEDICAL APPOINTMENTS OR FROM GETTING MEDICATIONS?: NO

## 2021-11-10 SDOH — ECONOMIC STABILITY: FOOD INSECURITY: WITHIN THE PAST 12 MONTHS, THE FOOD YOU BOUGHT JUST DIDN'T LAST AND YOU DIDN'T HAVE MONEY TO GET MORE.: NEVER TRUE

## 2021-11-10 SDOH — ECONOMIC STABILITY: INCOME INSECURITY: IN THE LAST 12 MONTHS, WAS THERE A TIME WHEN YOU WERE NOT ABLE TO PAY THE MORTGAGE OR RENT ON TIME?: NO

## 2021-11-10 SDOH — HEALTH STABILITY: PHYSICAL HEALTH: ON AVERAGE, HOW MANY DAYS PER WEEK DO YOU ENGAGE IN MODERATE TO STRENUOUS EXERCISE (LIKE A BRISK WALK)?: 5 DAYS

## 2021-11-10 SDOH — ECONOMIC STABILITY: INCOME INSECURITY: HOW HARD IS IT FOR YOU TO PAY FOR THE VERY BASICS LIKE FOOD, HOUSING, MEDICAL CARE, AND HEATING?: NOT HARD AT ALL

## 2021-11-10 SDOH — HEALTH STABILITY: PHYSICAL HEALTH: ON AVERAGE, HOW MANY MINUTES DO YOU ENGAGE IN EXERCISE AT THIS LEVEL?: 40 MIN

## 2021-11-10 SDOH — ECONOMIC STABILITY: TRANSPORTATION INSECURITY
IN THE PAST 12 MONTHS, HAS LACK OF TRANSPORTATION KEPT YOU FROM MEETINGS, WORK, OR FROM GETTING THINGS NEEDED FOR DAILY LIVING?: NO

## 2021-11-10 ASSESSMENT — LIFESTYLE VARIABLES
HOW MANY STANDARD DRINKS CONTAINING ALCOHOL DO YOU HAVE ON A TYPICAL DAY: 1 OR 2
HOW OFTEN DO YOU HAVE SIX OR MORE DRINKS ON ONE OCCASION: NEVER
HOW OFTEN DO YOU HAVE A DRINK CONTAINING ALCOHOL: 4 OR MORE TIMES A WEEK

## 2021-11-10 ASSESSMENT — SOCIAL DETERMINANTS OF HEALTH (SDOH)
IN A TYPICAL WEEK, HOW MANY TIMES DO YOU TALK ON THE PHONE WITH FAMILY, FRIENDS, OR NEIGHBORS?: MORE THAN THREE TIMES A WEEK
HOW OFTEN DO YOU GET TOGETHER WITH FRIENDS OR RELATIVES?: TWICE A WEEK
DO YOU BELONG TO ANY CLUBS OR ORGANIZATIONS SUCH AS CHURCH GROUPS UNIONS, FRATERNAL OR ATHLETIC GROUPS, OR SCHOOL GROUPS?: YES

## 2021-11-10 ASSESSMENT — ACTIVITIES OF DAILY LIVING (ADL): CURRENT_FUNCTION: NO ASSISTANCE NEEDED

## 2021-11-12 ENCOUNTER — OFFICE VISIT (OUTPATIENT)
Dept: INTERNAL MEDICINE | Facility: CLINIC | Age: 76
End: 2021-11-12
Payer: COMMERCIAL

## 2021-11-12 VITALS
HEART RATE: 79 BPM | BODY MASS INDEX: 26.88 KG/M2 | WEIGHT: 192 LBS | SYSTOLIC BLOOD PRESSURE: 150 MMHG | HEIGHT: 71 IN | OXYGEN SATURATION: 98 % | DIASTOLIC BLOOD PRESSURE: 72 MMHG

## 2021-11-12 DIAGNOSIS — Z12.5 SCREENING FOR PROSTATE CANCER: ICD-10-CM

## 2021-11-12 DIAGNOSIS — R70.0 ELEVATED ERYTHROCYTE SEDIMENTATION RATE: Primary | ICD-10-CM

## 2021-11-12 DIAGNOSIS — Z00.00 ROUTINE GENERAL MEDICAL EXAMINATION AT A HEALTH CARE FACILITY: Primary | ICD-10-CM

## 2021-11-12 LAB
ALBUMIN SERPL-MCNC: 3.9 G/DL (ref 3.5–5)
ALBUMIN UR-MCNC: 30 MG/DL
ALP SERPL-CCNC: 83 U/L (ref 45–120)
ALT SERPL W P-5'-P-CCNC: 25 U/L (ref 0–45)
ANION GAP SERPL CALCULATED.3IONS-SCNC: 13 MMOL/L (ref 5–18)
APPEARANCE UR: CLEAR
AST SERPL W P-5'-P-CCNC: 46 U/L (ref 0–40)
BACTERIA #/AREA URNS HPF: NORMAL /HPF
BILIRUB SERPL-MCNC: 1.4 MG/DL (ref 0–1)
BILIRUB UR QL STRIP: NEGATIVE
BUN SERPL-MCNC: 17 MG/DL (ref 8–28)
CALCIUM SERPL-MCNC: 9.7 MG/DL (ref 8.5–10.5)
CHLORIDE BLD-SCNC: 102 MMOL/L (ref 98–107)
CHOLEST SERPL-MCNC: 227 MG/DL
CO2 SERPL-SCNC: 24 MMOL/L (ref 22–31)
COLOR UR AUTO: YELLOW
CREAT SERPL-MCNC: 1.01 MG/DL (ref 0.7–1.3)
ERYTHROCYTE [DISTWIDTH] IN BLOOD BY AUTOMATED COUNT: 12.6 % (ref 10–15)
ERYTHROCYTE [SEDIMENTATION RATE] IN BLOOD BY WESTERGREN METHOD: 63 MM/HR (ref 0–15)
FASTING STATUS PATIENT QL REPORTED: YES
GFR SERPL CREATININE-BSD FRML MDRD: 72 ML/MIN/1.73M2
GLUCOSE BLD-MCNC: 94 MG/DL (ref 70–125)
GLUCOSE UR STRIP-MCNC: NEGATIVE MG/DL
HCT VFR BLD AUTO: 40.7 % (ref 40–53)
HDLC SERPL-MCNC: 60 MG/DL
HGB BLD-MCNC: 13.5 G/DL (ref 13.3–17.7)
HGB UR QL STRIP: ABNORMAL
KETONES UR STRIP-MCNC: ABNORMAL MG/DL
LDLC SERPL CALC-MCNC: 150 MG/DL
LEUKOCYTE ESTERASE UR QL STRIP: NEGATIVE
MCH RBC QN AUTO: 30.7 PG (ref 26.5–33)
MCHC RBC AUTO-ENTMCNC: 33.2 G/DL (ref 31.5–36.5)
MCV RBC AUTO: 93 FL (ref 78–100)
NITRATE UR QL: NEGATIVE
PH UR STRIP: 6.5 [PH] (ref 5–8)
PLATELET # BLD AUTO: 329 10E3/UL (ref 150–450)
POTASSIUM BLD-SCNC: 4.7 MMOL/L (ref 3.5–5)
PROT SERPL-MCNC: 8.2 G/DL (ref 6–8)
PSA SERPL-MCNC: 1.39 UG/L (ref 0–6.5)
RBC # BLD AUTO: 4.4 10E6/UL (ref 4.4–5.9)
RBC #/AREA URNS AUTO: NORMAL /HPF
SODIUM SERPL-SCNC: 139 MMOL/L (ref 136–145)
SP GR UR STRIP: 1.02 (ref 1–1.03)
SQUAMOUS #/AREA URNS AUTO: NORMAL /LPF
TRIGL SERPL-MCNC: 84 MG/DL
TSH SERPL DL<=0.005 MIU/L-ACNC: 2.35 UIU/ML (ref 0.3–5)
UROBILINOGEN UR STRIP-ACNC: 0.2 E.U./DL
WBC # BLD AUTO: 7.3 10E3/UL (ref 4–11)
WBC #/AREA URNS AUTO: NORMAL /HPF

## 2021-11-12 PROCEDURE — 85652 RBC SED RATE AUTOMATED: CPT | Performed by: INTERNAL MEDICINE

## 2021-11-12 PROCEDURE — 81001 URINALYSIS AUTO W/SCOPE: CPT | Performed by: INTERNAL MEDICINE

## 2021-11-12 PROCEDURE — 80053 COMPREHEN METABOLIC PANEL: CPT | Performed by: INTERNAL MEDICINE

## 2021-11-12 PROCEDURE — 84443 ASSAY THYROID STIM HORMONE: CPT | Performed by: INTERNAL MEDICINE

## 2021-11-12 PROCEDURE — 85027 COMPLETE CBC AUTOMATED: CPT | Performed by: INTERNAL MEDICINE

## 2021-11-12 PROCEDURE — 99397 PER PM REEVAL EST PAT 65+ YR: CPT | Performed by: INTERNAL MEDICINE

## 2021-11-12 PROCEDURE — G0103 PSA SCREENING: HCPCS | Performed by: INTERNAL MEDICINE

## 2021-11-12 PROCEDURE — 80061 LIPID PANEL: CPT | Performed by: INTERNAL MEDICINE

## 2021-11-12 PROCEDURE — 36415 COLL VENOUS BLD VENIPUNCTURE: CPT | Performed by: INTERNAL MEDICINE

## 2021-11-12 RX ORDER — BUDESONIDE 0.5 MG/2ML
INHALANT ORAL
COMMUNITY
Start: 2021-10-14 | End: 2022-05-27

## 2021-11-12 ASSESSMENT — MIFFLIN-ST. JEOR: SCORE: 1615.1

## 2021-11-12 ASSESSMENT — ACTIVITIES OF DAILY LIVING (ADL): CURRENT_FUNCTION: NO ASSISTANCE NEEDED

## 2021-11-12 NOTE — LETTER
November 15, 2021      Matthew Platt  [address] [facility] [address]        Dear ,    We are writing to inform you of your test results.    Lipids are slightly elevated and patient needs rosuvastatin 5 mg tablets dispense number 100 tablets take 1 tablet daily with 3 refills.  Please call patient and pharmacy.    All other labs are quite good isolated liver function test will be rechecked with next visit and only minimally elevated.    All other labs are quite good     Cameron Marroquin MD   11/12/2021  5:05 PM CST         ESR or sedimentation rate is elevated indicating a chronic inflammatory condition which may be related to his chronic sinusitis.  Suggest rheumatology consultation for further evaluation of chronic sinusitis with elevated sedimentation rate.    I will place order for rheumatology consult.         Resulted Orders   CBC with platelets   Result Value Ref Range    WBC Count 7.3 4.0 - 11.0 10e3/uL    RBC Count 4.40 4.40 - 5.90 10e6/uL    Hemoglobin 13.5 13.3 - 17.7 g/dL    Hematocrit 40.7 40.0 - 53.0 %    MCV 93 78 - 100 fL    MCH 30.7 26.5 - 33.0 pg    MCHC 33.2 31.5 - 36.5 g/dL    RDW 12.6 10.0 - 15.0 %    Platelet Count 329 150 - 450 10e3/uL   Erythrocyte sedimentation rate auto   Result Value Ref Range    Erythrocyte Sedimentation Rate 63 (H) 0 - 15 mm/hr   UA reflex to Microscopic and Culture   Result Value Ref Range    Color Urine Yellow Colorless, Straw, Light Yellow, Yellow    Appearance Urine Clear Clear    Glucose Urine Negative Negative mg/dL    Bilirubin Urine Negative Negative    Ketones Urine Trace (A) Negative mg/dL    Specific Gravity Urine 1.020 1.005 - 1.030    Blood Urine Trace (A) Negative    pH Urine 6.5 5.0 - 8.0    Protein Albumin Urine 30  (A) Negative mg/dL    Urobilinogen Urine 0.2 0.2, 1.0 E.U./dL    Nitrite Urine Negative Negative    Leukocyte Esterase Urine Negative Negative   Urine Microscopic   Result Value Ref Range    Bacteria Urine  None Seen None Seen /HPF    RBC Urine 0-2 0-2 /HPF /HPF    WBC Urine None Seen 0-5 /HPF /HPF    Squamous Epithelials Urine None Seen None Seen /LPF    Narrative    Urine Culture not indicated       If you have any questions or concerns, please call the clinic at the number listed above.       Sincerely,      Cameron Marroquin MD

## 2021-11-12 NOTE — PROGRESS NOTES
Annual Wellness Visit:  Matthew Platt  is a 76 year old male  who presents for an annual wellness visit.  Annual wellness visit chronic sinusitis discussed Wegener's granulomatosis sedimentation rate is pending previous consultations with multiple physicians including ear nose and throat specialist has been including [facility].  Discussed possible fungal etiology.        Assessment/Plan:  Annual wellness visit today check hemogram comprehensive metabolic profile urinalysis lipid panel PSA TSH and sedimentation rate.    Subjective:   Medical History:    Non-smoker 1 alcoholic drink per day allergies none.    Retired  history of Dupuytren's contracture surgery left hand also septoplasty cataract surgery bilaterally as well as blepharoplasty.    KENNEDY chronic sinusitis.  Past Medical History:   Diagnosis Date     Chronic sinusitis      Hoarseness 09/01/2018     Obstructive sleep apnea      Sleep apnea 09/01/2017     Tinnitus      Current Outpatient Medications   Medication     budesonide (PULMICORT) 0.5 MG/2ML neb solution     Multiple Vitamins-Minerals (PRESERVISION AREDS 2 PO)     multivitamin (CENTRUM SILVER) tablet     No current facility-administered medications for this visit.     Immunization History   Administered Date(s) Administered     COVID-19,PF,Pfizer (12+ Yrs) 01/17/2021, 02/10/2021, 09/30/2021     FLU 6-35 months 09/26/2009     Flu, Unspecified 09/14/2016, 09/10/2017, 09/11/2018, 10/07/2019     Influenza (High Dose) 3 valent vaccine 10/07/2019     Influenza (IIV3) PF 10/15/2007, 11/03/2010, 09/17/2012     Influenza Vaccine IM > 6 months Valent IIV4 (Alfuria,Fluzone) 09/10/2013     Influenza vaccine ages 6-35 months 09/21/2015, 09/19/2016, 09/18/2017, 09/27/2018     Pneumo Conj 13-V (2010&after) 11/25/2016     Pneumococcal 23 valent 04/26/2017     Zoster vaccine, live 03/26/2013       Surgical History:  Past Surgical History:   Procedure Laterality Date     CATARACT EXTRACTION,  "BILATERAL Bilateral      ENT SURGERY       OTHER SURGICAL HISTORY Bilateral     facial plastic surgery     PE TUBES       RELEASE DUPUYTRENS CONTRACTURE Left     4th finger     TONSILLECTOMY       TONSILLECTOMY       VASECTOMY        Colonoscopy dated May 26, 2015 with Dr. Veronica polyps previous adenomatous polyps noted no cancer.  Family History:  Mother  53 cirrhosis nonalcoholic related perhaps to hep C    Father  87 pancreatic cancer.  2 children well wife well.    Social History:  Retired .  Allergies listed simvastatin.    Health Maintenances:  Immunizations reviewed and up-to-date he has had the Covid vaccine including the booster.  Colonoscopy up-to-date May 26, 2015 polyps benign no cancer.    Objective:  BP (!) 150/72   Pulse 79   Ht 1.791 m (5' 10.5\")   Wt 87.1 kg (192 lb)   SpO2 98%   BMI 27.16 kg/m    Chest clear to auscultation and percussion.  Heart tones regular rhythm without murmur rub or gallop.  Abdomen soft nontender no organomegaly.  No peritoneal signs.  Extremities free of edema cyanosis or clubbing.  Neck veins nondistended no thyromegaly or scleral icterus noted, carotids full.  Skin warm and dry easily conversant good spirited.  Normal intelligence.  Neurologically intact no gross localizing findings.  Small prostate on exam rest the rectal negative genital testicular scrotal contents clear no groin hernias skin negative lymph negative neuro negative psych normal HEENT negative back straight no severe spine tenderness scarring changes and retraction around the eyelids prior bra arthroplasty.  Bilateral.  Rest of exam negative he is easily conversant good spirited distinguish and intelligent.  Appears younger than stated age.    Cameron Marroquin MD, MD  Answers for HPI/ROS submitted by the patient on 11/10/2021  In general, how would you rate your overall physical health?: excellent  Frequency of exercise:: 4-5 days/week  Do you usually eat at least 4 servings of " "fruit and vegetables a day, include whole grains & fiber, and avoid regularly eating high fat or \"junk\" foods? : Yes  Taking medications regularly:: Yes  Medication side effects:: None  Activities of Daily Living: no assistance needed  Home safety: no safety concerns identified  Hearing Impairment:: no hearing concerns  In the past 6 months, have you been bothered by leaking of urine?: No  In general, how would you rate your overall mental or emotional health?: excellent  Additional concerns today:: Yes  Duration of exercise:: 30-45 minutes      "

## 2021-11-12 NOTE — LETTER
November 12, 2021      Matthew Blackburnson  [address]  [facility] [address]        Dear ,    We are writing to inform you of your test results.    All clear and good       Resulted Orders   CBC with platelets   Result Value Ref Range    WBC Count 7.3 4.0 - 11.0 10e3/uL    RBC Count 4.40 4.40 - 5.90 10e6/uL    Hemoglobin 13.5 13.3 - 17.7 g/dL    Hematocrit 40.7 40.0 - 53.0 %    MCV 93 78 - 100 fL    MCH 30.7 26.5 - 33.0 pg    MCHC 33.2 31.5 - 36.5 g/dL    RDW 12.6 10.0 - 15.0 %    Platelet Count 329 150 - 450 10e3/uL   UA reflex to Microscopic and Culture   Result Value Ref Range    Color Urine Yellow Colorless, Straw, Light Yellow, Yellow    Appearance Urine Clear Clear    Glucose Urine Negative Negative mg/dL    Bilirubin Urine Negative Negative    Ketones Urine Trace (A) Negative mg/dL    Specific Gravity Urine 1.020 1.005 - 1.030    Blood Urine Trace (A) Negative    pH Urine 6.5 5.0 - 8.0    Protein Albumin Urine 30  (A) Negative mg/dL    Urobilinogen Urine 0.2 0.2, 1.0 E.U./dL    Nitrite Urine Negative Negative    Leukocyte Esterase Urine Negative Negative   Urine Microscopic   Result Value Ref Range    Bacteria Urine None Seen None Seen /HPF    RBC Urine 0-2 0-2 /HPF /HPF    WBC Urine None Seen 0-5 /HPF /HPF    Squamous Epithelials Urine None Seen None Seen /LPF    Narrative    Urine Culture not indicated       If you have any questions or concerns, please call the clinic at the number listed above.       Sincerely,      Cameron Marroquin MD

## 2021-11-12 NOTE — LETTER
November 12, 2021      Matthew Blackburnson  [address]  [facility] [address]        Dear ,    We are writing to inform you of your test results.    All clear and very good       Resulted Orders   CBC with platelets   Result Value Ref Range    WBC Count 7.3 4.0 - 11.0 10e3/uL    RBC Count 4.40 4.40 - 5.90 10e6/uL    Hemoglobin 13.5 13.3 - 17.7 g/dL    Hematocrit 40.7 40.0 - 53.0 %    MCV 93 78 - 100 fL    MCH 30.7 26.5 - 33.0 pg    MCHC 33.2 31.5 - 36.5 g/dL    RDW 12.6 10.0 - 15.0 %    Platelet Count 329 150 - 450 10e3/uL   UA reflex to Microscopic and Culture   Result Value Ref Range    Color Urine Yellow Colorless, Straw, Light Yellow, Yellow    Appearance Urine Clear Clear    Glucose Urine Negative Negative mg/dL    Bilirubin Urine Negative Negative    Ketones Urine Trace (A) Negative mg/dL    Specific Gravity Urine 1.020 1.005 - 1.030    Blood Urine Trace (A) Negative    pH Urine 6.5 5.0 - 8.0    Protein Albumin Urine 30  (A) Negative mg/dL    Urobilinogen Urine 0.2 0.2, 1.0 E.U./dL    Nitrite Urine Negative Negative    Leukocyte Esterase Urine Negative Negative   Urine Microscopic   Result Value Ref Range    Bacteria Urine None Seen None Seen /HPF    RBC Urine 0-2 0-2 /HPF /HPF    WBC Urine None Seen 0-5 /HPF /HPF    Squamous Epithelials Urine None Seen None Seen /LPF    Narrative    Urine Culture not indicated       If you have any questions or concerns, please call the clinic at the number listed above.       Sincerely,      Cameron Marroquin MD

## 2021-11-12 NOTE — PROGRESS NOTES
"SUBJECTIVE:   Matthew Platt is a 76 year old male who presents for Preventive Visit.    Patient has been advised of split billing requirements and indicates understanding: Yes   Are you in the first 12 months of your Medicare coverage?  No    Healthy Habits:     In general, how would you rate your overall health?  Excellent    Frequency of exercise:  4-5 days/week    Duration of exercise:  30-45 minutes    Do you usually eat at least 4 servings of fruit and vegetables a day, include whole grains    & fiber and avoid regularly eating high fat or \"junk\" foods?  Yes    Taking medications regularly:  Yes    Medication side effects:  None    Ability to successfully perform activities of daily living:  No assistance needed    Home Safety:  No safety concerns identified    Hearing Impairment:  No hearing concerns    In the past 6 months, have you been bothered by leaking of urine?  No    In general, how would you rate your overall mental or emotional health?  Excellent      PHQ-2 Total Score: 0    Additional concerns today:  Yes    Do you feel safe in your environment? Yes    Have you ever done Advance Care Planning? (For example, a Health Directive, POLST, or a discussion with a medical provider or your loved ones about your wishes): Yes, advance care planning is on file.       Fall risk  none    {If any of the above assessments are answered yes, consider ordering appropriate referrals (Optional):352368::\"click delete button to remove this line now\"}  Cognitive Screening  Clock   Two   Words    Do you have sleep apnea, excessive snoring or daytime drowsiness?: no    Reviewed and updated as needed this visit by clinical staff  Tobacco  Allergies  Meds             Reviewed and updated as needed this visit by Provider               Social History     Tobacco Use     Smoking status: Former Smoker     Packs/day: 1.00     Years: 5.00     Pack years: 5.00     Types: Cigarettes, Cigars, Pipe     Start date: 9/1/1966     " Quit date: 1974     Years since quittin.8     Smokeless tobacco: Never Used   Substance Use Topics     Alcohol use: Yes     Alcohol/week: 7.0 standard drinks     Types: 7 Glasses of wine per week     {Rooming Staff- Complete this question if Prescreen response is not shown below for today's visit. If you drink alcohol do you typically have >3 drinks per day or >7 drinks per week? (Optional):075989}    Alcohol Use 11/10/2021   Prescreen: >3 drinks/day or >7 drinks/week? No   {add AUDIT responses (Optional) (A score of 7 for adult men is an indication of hazardous drinking; a score of 8 or more is an indication of an alcohol use disorder.  A score of 7 or more for adult women is an indication of hazardous drinking or an alchohol use disorder):441532}    {Outside tests to abstract? :185991}    {additional problems to add (Optional):771987}    Current providers sharing in care for this patient include: {Rooming staff:  Please update Care Team in Rooming Activity, refresh this note and then delete this statement}  Patient Care Team:  Nj Laureano MD as PCP - Meghana Mortensen MD as MD (Otolaryngology)  Bernardino Schwartz MD as Assigned PCP    The following health maintenance items are reviewed in Epic and correct as of today:  Health Maintenance Due   Topic Date Due     ANNUAL REVIEW OF HM ORDERS  Never done     ADVANCE CARE PLANNING  Never done     HEPATITIS C SCREENING  Never done     DTAP/TDAP/TD IMMUNIZATION (1 - Tdap) Never done     ZOSTER IMMUNIZATION (2 of 3) 2013     MEDICARE ANNUAL WELLNESS VISIT  2017     FALL RISK ASSESSMENT  2020     INFLUENZA VACCINE (1) 2021     COVID-19 Vaccine (2 - Pfizer 3-dose booster series) 10/21/2021     LIPID  2021     {Chronicprobdata (optional):727301}  {Decision Support (Optional):899589}        Review of Systems  {ROS COMP (Optional):551435}    OBJECTIVE:   BP (!) 144/74 (BP Location: Right arm, Patient Position: Sitting)   Pulse 79  "  Ht 1.791 m (5' 10.5\")   Wt 87.1 kg (192 lb)   SpO2 98%   BMI 27.16 kg/m   Estimated body mass index is 27.16 kg/m  as calculated from the following:    Height as of this encounter: 1.791 m (5' 10.5\").    Weight as of this encounter: 87.1 kg (192 lb).  Physical Exam  {Exam (Optional) :898773}    {Diagnostic Test Results (Optional):555125::\"Diagnostic Test Results:\",\"Labs reviewed in Epic\"}    ASSESSMENT / PLAN:   {Diag Picklist:436302}    Patient has been advised of split billing requirements and indicates understanding: {YES / NO:556960::\"Yes\"}  COUNSELING:  {Medicare Counselin}    Estimated body mass index is 27.16 kg/m  as calculated from the following:    Height as of this encounter: 1.791 m (5' 10.5\").    Weight as of this encounter: 87.1 kg (192 lb).    {Weight Management Plan (ACO) Complete if BMI is abnormal-  Ages 18-64  BMI >24.9.  Age 65+ with BMI <23 or >30 (Optional):569436}    He reports that he quit smoking about 47 years ago. His smoking use included cigarettes, cigars, and pipe. He started smoking about 55 years ago. He has a 5.00 pack-year smoking history. He has never used smokeless tobacco.      Appropriate preventive services were discussed with this patient, including applicable screening as appropriate for cardiovascular disease, diabetes, osteopenia/osteoporosis, and glaucoma.  As appropriate for age/gender, discussed screening for colorectal cancer, prostate cancer, breast cancer, and cervical cancer. Checklist reviewing preventive services available has been given to the patient.    Reviewed patients plan of care and provided an AVS. The {CarePlan:464055} for Matthew meets the Care Plan requirement. This Care Plan has been established and reviewed with the {PATIENT, FAMILY MEMBER, CAREGIVER:824907}.    Counseling Resources:  ATP IV Guidelines  Pooled Cohorts Equation Calculator  Breast Cancer Risk Calculator  Breast Cancer: Medication to Reduce Risk  FRAX Risk Assessment  ICSI " Preventive Guidelines  Dietary Guidelines for Americans, 2010  USDA's MyPlate  ASA Prophylaxis  Lung CA Screening    Cameron Marroquin MD  [facility]    Identified Health Risks:

## 2021-11-15 DIAGNOSIS — I10 ESSENTIAL HYPERTENSION: Primary | ICD-10-CM

## 2021-11-15 RX ORDER — ROSUVASTATIN CALCIUM 5 MG/1
5 TABLET, COATED ORAL DAILY
Qty: 90 TABLET | Refills: 3 | Status: SHIPPED | OUTPATIENT
Start: 2021-11-15 | End: 2023-12-04

## 2022-05-27 ENCOUNTER — OFFICE VISIT (OUTPATIENT)
Dept: INTERNAL MEDICINE | Facility: CLINIC | Age: 77
End: 2022-05-27
Payer: COMMERCIAL

## 2022-05-27 VITALS
HEART RATE: 85 BPM | RESPIRATION RATE: 18 BRPM | TEMPERATURE: 98.2 F | HEIGHT: 71 IN | BODY MASS INDEX: 28.73 KG/M2 | SYSTOLIC BLOOD PRESSURE: 136 MMHG | WEIGHT: 205.2 LBS | OXYGEN SATURATION: 99 % | DIASTOLIC BLOOD PRESSURE: 70 MMHG

## 2022-05-27 DIAGNOSIS — R55 SYNCOPE, UNSPECIFIED SYNCOPE TYPE: Primary | ICD-10-CM

## 2022-05-27 PROCEDURE — 99213 OFFICE O/P EST LOW 20 MIN: CPT | Performed by: INTERNAL MEDICINE

## 2022-05-27 RX ORDER — AZITHROMYCIN 250 MG/1
TABLET, FILM COATED ORAL
Qty: 6 TABLET | Refills: 1 | Status: SHIPPED | OUTPATIENT
Start: 2022-05-27 | End: 2022-06-01

## 2022-05-27 RX ORDER — VALSARTAN 80 MG/1
80 TABLET ORAL DAILY
COMMUNITY
Start: 2022-05-13 | End: 2023-12-04

## 2022-05-27 RX ORDER — AZITHROMYCIN 250 MG/1
TABLET, FILM COATED ORAL
Qty: 6 TABLET | Refills: 0 | Status: SHIPPED | OUTPATIENT
Start: 2022-05-27 | End: 2022-05-27

## 2022-05-27 NOTE — PROGRESS NOTES
Assessment/Plan:    Syncopal spell associated with non-ST elevation myocardial infarction elevated troponin level with new left bundle branch block.  Cardiology consult.  Previous history of syncopal spell 4 to 5 years ago.  See [facility] consultation notes.  Initial spell occurred in [facility] while golfing and having 1 alcoholic cocktail drink  syncopal spell lasted 5 to 7 minutes according to reports.  Return to clinic 1 month's time after cardiac consultation.    Chronic sinusitis.  Z-Cristopher ordered with 1 refill.    15 minutes spent on the date of the encounter doing chart review, review of outside records, patient visit and documentation     Subjective:  Matthew Platt is a 76 year old male presents for the following health issues Z-Cristopher for sinusitis with 1 refill.    Syncopal spell occurred 4 to 5 years prior.    Most recent spell occurred in [facility] with transferred to the [facility].  Working diagnosis syncope related to dehydration.  On close review of the medical records non-ST elevation myocardial infarction was diagnosed with elevated troponin level.  Episode of syncope lasting 5 to 7 minutes while playing golf and having a cocktail.  Whiskey.  New left bundle branch block was noted on electrocardiogram along with slightly elevated troponin level.  Hospitalized overnight at the [facility].  Valsartan was added 80 mg then decrease to 40 mg because of dizziness.  Already on rosuvastatin for hyperlipidemia.    Currently without chest pain shortness of breath no seizure activity no rectal or urinary incontinence no headache or nausea or vomiting.  1 black stool after eating licorice.    ROS:  No blood in stool or urine med list reviewed reconciled.  Also noted in the medical records mild anemia.  Normochromic normocytic    Objective:  /70 (BP Location: Right arm, Patient Position: Sitting, Cuff Size: Adult Regular)   Pulse 85   Temp 98.2  F (36.8  C) (Oral)   Resp 18   Ht  "1.813 m (5' 11.38\")   Wt 93.1 kg (205 lb 3.2 oz)   SpO2 99%   BMI 28.32 kg/m    Conversant intelligent not in acute distress mild ptosis noted left eye with some periorbital edema left side neck veins nondistended no carotid bruits lungs clear posteriorly no rales rhonchi or wheezes heart tones regular rhythm without murmur rub or gallop abdomen mild centripetal obesity extremities free of edema cyanosis or clubbing and golf attire intelligent easily conversant good spirited.  Quiet soft-spoken.    Cameron Marroquin MD  Internal Medicine    "

## 2022-08-05 ENCOUNTER — OFFICE VISIT (OUTPATIENT)
Dept: CARDIOLOGY | Facility: CLINIC | Age: 77
End: 2022-08-05
Payer: COMMERCIAL

## 2022-08-05 VITALS
HEIGHT: 71 IN | SYSTOLIC BLOOD PRESSURE: 140 MMHG | DIASTOLIC BLOOD PRESSURE: 78 MMHG | HEART RATE: 95 BPM | BODY MASS INDEX: 28.14 KG/M2 | RESPIRATION RATE: 20 BRPM | WEIGHT: 201 LBS

## 2022-08-05 DIAGNOSIS — I44.7 LBBB (LEFT BUNDLE BRANCH BLOCK): Primary | ICD-10-CM

## 2022-08-05 DIAGNOSIS — I21.4 NSTEMI (NON-ST ELEVATED MYOCARDIAL INFARCTION) (H): ICD-10-CM

## 2022-08-05 DIAGNOSIS — R55 SYNCOPE, UNSPECIFIED SYNCOPE TYPE: ICD-10-CM

## 2022-08-05 PROCEDURE — 99204 OFFICE O/P NEW MOD 45 MIN: CPT | Performed by: INTERNAL MEDICINE

## 2022-08-05 NOTE — PROGRESS NOTES
[facility]  Cardiac Electrophysiology  [address]  [place] [address]   Office: [phone]  Fax: [phone]     Cardiac Electrophysiology Consultation    Patient: Matthew Platt   : 1945     Referring Provider: Cameron Marroquin MD  Primary Care Provider: Nj Laureano MD    CHIEF COMPLAINT/REASON FOR CONSULTATION  Syncope    Assessment/Recommendations   Matthew Platt is a 76 year old male with syncope, LBBB, HTN, dyslipidemia, chronic sinusitis referred by Dr. Marroquin for consultation regarding syncope.    Syncope - unclear etiology presently, possibly dehydration/orthostasis related, cannot rule out paroxysmal arrhythmia, neurologic cause.  No structural heart disease.  - we discussed the option of implantable loop recorder (ILR) implantation for long term cardiac rhythm monitoring.  We elected to hold off on this for now, though can revisit in case of recurrent unexplained syncope.  - we discussed further evaluation of positive troponin and LBBB, including options for 1) non-invasive stress testing and 2) consultation with interventional cardiology for consideration of invasive coronary angiography.  We elected to proceed initially with non-invasive stress testing, with plan for invasive cardiology consultation if positive findings on stress test.    Follow up: as above, we will contact him with MPI results, future EP follow as needed         History of Present Illness   Matthew Platt is a 76 year old male with syncope, LBBB, HTN, dyslipidemia, chronic sinusitis referred by Dr. Marroquin for consultation regarding syncope.    Mr. Platt had an initial episode of syncope approximately 10 years ago while playing golf - evaluation was unremarkable and ultimately his episode was attributed to vasovagal syncope.  He had recurrent syncope event on 2022 - he had completed a 4hr round of golf and had just finished a glass of whiskey with  "friends when he developed lightheadedness and vision changes followed by a 5-7min episode of loss of consciousness.  EMS and ER evaluation were notable for SR with newly noted LBBB (since 2019 ECG), positive troponin, EtOH 6.4, lactate 2.5, Hgb 12.6.  He underwent TTE which was reportedly unremarkable.  He was discharged with 48hr Holter monitor which showed sinus rates 56-103bpm, average 85bpm, no tachy or bradyarrhythmias.       results are unavailable and he was not contacted regarding results.      He exercises on regular basis - he played 18 holes of golf earlier today.  He used to play football and baseball in high school and college and had no issues with syncope or unexpected exertional limitation.  He denies history of chest pain.       Physical Examination  Review of Systems   VITALS: BP (!) 140/78 (BP Location: Right arm, Patient Position: Sitting, Cuff Size: Adult Regular)   Pulse 95   Resp 20   Ht 1.813 m (5' 11.38\")   Wt 91.2 kg (201 lb)   BMI 27.74 kg/m    Wt Readings from Last 3 Encounters:   05/27/22 93.1 kg (205 lb 3.2 oz)   11/12/21 87.1 kg (192 lb)   12/03/19 90.7 kg (200 lb)     CONSTITUTIONAL: well nourished, comfortable, no distress  EYES:  Conjunctivae pink, sclerae clear.    E/N/T:  Oral mucosa pink  RESPIRATORY:  Respiratory effort is normal  CARDIOVASCULAR:  normal S1 and S2  GASTROINTESTINAL:  Abdomen without masses or tenderness  EXTREMITIES:  No clubbing or cyanosis.    MUSCULOSKELETAL:  Overall grossly normal muscle strength  SKIN:  Overall, skin warm and dry, no lesions.  NEURO/PSYCH:  Oriented x 3 with normal affect.   Constitutional:  No weight loss or loss of appetite    Eyes:  No difficulty with vision, no double vision, no dry eyes  ENT:  No sore throat, difficulty swallowing; changes in hearing or tinnitus  Cardiovascular: As detailed above  Respiratory:  No cough  Musculoskeletal  No joint pain, muscle aches  Neurologic:  No tremor   Hematologic: No easy bruising, " excessive bleeding tendency   Gastrointestinal:  No jaundice, abdominal pain or abdominal bloating  Genitourinary: No changes in urinary habits, no trouble urinating    Psychiatric: No anxiety or depression      Medical History  Surgical History   Past Medical History:   Diagnosis Date     Chronic sinusitis      Hoarseness 2018     Obstructive sleep apnea      Sleep apnea 2017     Tinnitus     Past Surgical History:   Procedure Laterality Date     CATARACT EXTRACTION, BILATERAL Bilateral      ENT SURGERY       OTHER SURGICAL HISTORY Bilateral     facial plastic surgery     PE TUBES       RELEASE DUPUYTRENS CONTRACTURE Left     4th finger     TONSILLECTOMY       TONSILLECTOMY       VASECTOMY           Family History Social History   Family History   Problem Relation Age of Onset     Cancer Father      Pancreatic Cancer Father 87.00     Hepatitis Mother      No Known Problems Sister      No Known Problems Daughter      No Known Problems Son         Social History     Tobacco Use     Smoking status: Former Smoker     Packs/day: 1.00     Years: 5.00     Pack years: 5.00     Types: Cigarettes, Cigars, Pipe     Start date: 1966     Quit date: 1974     Years since quittin.6     Smokeless tobacco: Never Used   Substance Use Topics     Alcohol use: Yes     Alcohol/week: 7.0 standard drinks     Types: 7 Glasses of wine per week     Drug use: No         Medications  Allergies     Current Outpatient Medications:      Multiple Vitamins-Minerals (PRESERVISION AREDS 2 PO), , Disp: , Rfl:      multivitamin (CENTRUM SILVER) tablet, Take 1 tablet by mouth daily, Disp: , Rfl:      rosuvastatin (CRESTOR) 5 MG tablet, Take 1 tablet (5 mg) by mouth daily, Disp: 90 tablet, Rfl: 3     valsartan (DIOVAN) 80 MG tablet, Take 80 mg by mouth daily, Disp: , Rfl:    No Known Allergies       Lab Results    Chemistry CBC Cardiac Enzymes/BNP/TSH/INR   Recent Labs   Lab Test 21  1231      POTASSIUM 4.7   CHLORIDE  102   CO2 24   GLC 94   BUN 17   CR 1.01   GFRESTIMATED 72   EFFIE 9.7     Recent Labs   Lab Test 11/12/21  1231 03/26/19  1410 02/12/18  1433   CR 1.01 0.90 0.95          Recent Labs   Lab Test 11/12/21  1231   WBC 7.3   HGB 13.5   HCT 40.7   MCV 93        Recent Labs   Lab Test 11/12/21  1231 03/04/19  1737 02/12/18  1433   HGB 13.5 13.4 14.5    No results for input(s): TROPONINI in the last 89870 hours.  No results for input(s): BNP, NTBNPI, NTBNP in the last 65525 hours.  Recent Labs   Lab Test 11/12/21  1231   TSH 2.35     No results for input(s): INR in the last 14124 hours.      Data Review    ECGs (tracings independently reviewed)  5/13/2022 - SR 80bpm, AK 222ms, LBBB QRS 134ms  2/1/2019 - EAR 72bpm    5/13/2022 Holter monitor      5/13/2022 TTE         45 minutes was spent reviewing the chart and in direct discussion with the patient.    Cc: Cameron Marroquin MD, Nj Laureano MD Amila Dilusha William, MD  8/5/2022  3:36 PM

## 2022-08-05 NOTE — PATIENT INSTRUCTIONS
[facility]  Cardiac Electrophysiology  [address]  [place] [address]   Office: [phone]  Fax: [phone]       Thank you for seeing us in clinic today - it is a pleasure to be a part of your care team.  Below is a summary of our plan from today's visit.      You have had two episode of syncope (loss of consciousness) with newly noted left bundle branch block (LBBB) and positive troponin. The cause of your episodes of syncope is without clear cause thus far.  We reviewed possible causes for syncope including cardiac arrhythmias, structural heart disease, neurologic causes, dehydration, vasovagal etiology.      We discussed the option of implantable loop recorder (ILR) implantation for long term cardiac rhythm monitoring.  We elected to hold off on this for now, though can revisit in case of recurrent unexplained syncope.    We discussed further evaluation of your positive troponin and LBBB, including options for 1) non-invasive stress testing and 2) consultation with an interventional cardiologist for consideration of invasive coronary angiography.  We elected to proceed initially with non-invasive stress testing, with plan for invasive cardiology consultation if positive findings on stress test.      Please do not hesitate to be in touch with our office at [phone] with any questions that may arise.      Thank you for trusting us with your care,    Vincent Ventura MD  Clinical Cardiac Electrophysiology  [facility]  [address]  [place] [address]   Office: [phone]  Fax: [phone]

## 2022-08-05 NOTE — LETTER
2022    Cameron Marroquin MD  [address] [address]    RE: Matthew Platt       Dear Colleague,     I had the pleasure of seeing Matthew Platt in the [facility].     [facility]  Cardiac Electrophysiology  [address]  [place] [address]   Office: [phone]  Fax: [phone]     Cardiac Electrophysiology Consultation    Patient: Matthew Platt   : 1945     Referring Provider: Cameron Marroquin MD  Primary Care Provider: Nj Laureano MD    CHIEF COMPLAINT/REASON FOR CONSULTATION  Syncope    Assessment/Recommendations   Matthew Platt is a 76 year old male with syncope, LBBB, HTN, dyslipidemia, chronic sinusitis referred by Dr. Marroquin for consultation regarding syncope.    Syncope - unclear etiology presently, possibly dehydration/orthostasis related, cannot rule out paroxysmal arrhythmia, neurologic cause.  No structural heart disease.  - we discussed the option of implantable loop recorder (ILR) implantation for long term cardiac rhythm monitoring.  We elected to hold off on this for now, though can revisit in case of recurrent unexplained syncope.  - we discussed further evaluation of positive troponin and LBBB, including options for 1) non-invasive stress testing and 2) consultation with interventional cardiology for consideration of invasive coronary angiography.  We elected to proceed initially with non-invasive stress testing, with plan for invasive cardiology consultation if positive findings on stress test.    Follow up: as above, we will contact him with MPI results, future EP follow as needed         History of Present Illness   Matthew Platt is a 76 year old male with syncope, LBBB, HTN, dyslipidemia, chronic sinusitis referred by Dr. Marroquin for consultation regarding syncope.    Mr. Platt had an initial episode of syncope approximately 10 years ago while playing golf -  "evaluation was unremarkable and ultimately his episode was attributed to vasovagal syncope.  He had recurrent syncope event on 5/13/2022 - he had completed a 4hr round of golf and had just finished a glass of whiskey with friends when he developed lightheadedness and vision changes followed by a 5-7min episode of loss of consciousness.  EMS and ER evaluation were notable for SR with newly noted LBBB (since 2019 ECG), positive troponin, EtOH 6.4, lactate 2.5, Hgb 12.6.  He underwent TTE which was reportedly unremarkable.  He was discharged with 48hr Holter monitor which showed sinus rates 56-103bpm, average 85bpm, no tachy or bradyarrhythmias.       results are unavailable and he was not contacted regarding results.      He exercises on regular basis - he played 18 holes of golf earlier today.  He used to play football and baseball in high school and college and had no issues with syncope or unexpected exertional limitation.  He denies history of chest pain.       Physical Examination  Review of Systems   VITALS: BP (!) 140/78 (BP Location: Right arm, Patient Position: Sitting, Cuff Size: Adult Regular)   Pulse 95   Resp 20   Ht 1.813 m (5' 11.38\")   Wt 91.2 kg (201 lb)   BMI 27.74 kg/m    Wt Readings from Last 3 Encounters:   05/27/22 93.1 kg (205 lb 3.2 oz)   11/12/21 87.1 kg (192 lb)   12/03/19 90.7 kg (200 lb)     CONSTITUTIONAL: well nourished, comfortable, no distress  EYES:  Conjunctivae pink, sclerae clear.    E/N/T:  Oral mucosa pink  RESPIRATORY:  Respiratory effort is normal  CARDIOVASCULAR:  normal S1 and S2  GASTROINTESTINAL:  Abdomen without masses or tenderness  EXTREMITIES:  No clubbing or cyanosis.    MUSCULOSKELETAL:  Overall grossly normal muscle strength  SKIN:  Overall, skin warm and dry, no lesions.  NEURO/PSYCH:  Oriented x 3 with normal affect.   Constitutional:  No weight loss or loss of appetite    Eyes:  No difficulty with vision, no double vision, no dry eyes  ENT:  No sore throat, " difficulty swallowing; changes in hearing or tinnitus  Cardiovascular: As detailed above  Respiratory:  No cough  Musculoskeletal  No joint pain, muscle aches  Neurologic:  No tremor   Hematologic: No easy bruising, excessive bleeding tendency   Gastrointestinal:  No jaundice, abdominal pain or abdominal bloating  Genitourinary: No changes in urinary habits, no trouble urinating    Psychiatric: No anxiety or depression      Medical History  Surgical History   Past Medical History:   Diagnosis Date     Chronic sinusitis      Hoarseness 2018     Obstructive sleep apnea      Sleep apnea 2017     Tinnitus     Past Surgical History:   Procedure Laterality Date     CATARACT EXTRACTION, BILATERAL Bilateral      ENT SURGERY       OTHER SURGICAL HISTORY Bilateral     facial plastic surgery     PE TUBES       RELEASE DUPUYTRENS CONTRACTURE Left     4th finger     TONSILLECTOMY       TONSILLECTOMY       VASECTOMY           Family History Social History   Family History   Problem Relation Age of Onset     Cancer Father      Pancreatic Cancer Father 87.00     Hepatitis Mother      No Known Problems Sister      No Known Problems Daughter      No Known Problems Son         Social History     Tobacco Use     Smoking status: Former Smoker     Packs/day: 1.00     Years: 5.00     Pack years: 5.00     Types: Cigarettes, Cigars, Pipe     Start date: 1966     Quit date: 1974     Years since quittin.6     Smokeless tobacco: Never Used   Substance Use Topics     Alcohol use: Yes     Alcohol/week: 7.0 standard drinks     Types: 7 Glasses of wine per week     Drug use: No         Medications  Allergies     Current Outpatient Medications:      Multiple Vitamins-Minerals (PRESERVISION AREDS 2 PO), , Disp: , Rfl:      multivitamin (CENTRUM SILVER) tablet, Take 1 tablet by mouth daily, Disp: , Rfl:      rosuvastatin (CRESTOR) 5 MG tablet, Take 1 tablet (5 mg) by mouth daily, Disp: 90 tablet, Rfl: 3     valsartan (DIOVAN)  80 MG tablet, Take 80 mg by mouth daily, Disp: , Rfl:    No Known Allergies       Lab Results    Chemistry CBC Cardiac Enzymes/BNP/TSH/INR   Recent Labs   Lab Test 11/12/21  1231      POTASSIUM 4.7   CHLORIDE 102   CO2 24   GLC 94   BUN 17   CR 1.01   GFRESTIMATED 72   EFFIE 9.7     Recent Labs   Lab Test 11/12/21  1231 03/26/19  1410 02/12/18  1433   CR 1.01 0.90 0.95          Recent Labs   Lab Test 11/12/21  1231   WBC 7.3   HGB 13.5   HCT 40.7   MCV 93        Recent Labs   Lab Test 11/12/21  1231 03/04/19  1737 02/12/18  1433   HGB 13.5 13.4 14.5    No results for input(s): TROPONINI in the last 29211 hours.  No results for input(s): BNP, NTBNPI, NTBNP in the last 37517 hours.  Recent Labs   Lab Test 11/12/21  1231   TSH 2.35     No results for input(s): INR in the last 23092 hours.      Data Review    ECGs (tracings independently reviewed)  5/13/2022 - SR 80bpm, SD 222ms, LBBB QRS 134ms  2/1/2019 - EAR 72bpm    5/13/2022 Holter monitor      5/13/2022 TTE         45 minutes was spent reviewing the chart and in direct discussion with the patient.    Cc: Cameron Marroquin MD, Nj Laureano MD Amila Dilusha William, MD  8/5/2022  3:36 PM      Thank you for allowing me to participate in the care of your patient.      Sincerely,     Vincent Ventura MD     [facility]  cc:   Cameron Marroquin MD  [address] [place] [address]

## 2022-08-08 ENCOUNTER — HOSPITAL ENCOUNTER (OUTPATIENT)
Dept: CARDIOLOGY | Facility: HOSPITAL | Age: 77
Discharge: HOME | End: 2022-08-08
Attending: INTERNAL MEDICINE
Payer: COMMERCIAL

## 2022-08-08 ENCOUNTER — HOSPITAL ENCOUNTER (OUTPATIENT)
Dept: NUCLEAR MEDICINE | Facility: HOSPITAL | Age: 77
Discharge: HOME | End: 2022-08-08
Attending: INTERNAL MEDICINE
Payer: COMMERCIAL

## 2022-08-08 DIAGNOSIS — R55 SYNCOPE, UNSPECIFIED SYNCOPE TYPE: ICD-10-CM

## 2022-08-08 DIAGNOSIS — I21.4 NSTEMI (NON-ST ELEVATED MYOCARDIAL INFARCTION) (H): ICD-10-CM

## 2022-08-08 DIAGNOSIS — I44.7 LBBB (LEFT BUNDLE BRANCH BLOCK): ICD-10-CM

## 2022-08-08 LAB
CV STRESS CURRENT BP HE: NORMAL
CV STRESS CURRENT HR HE: 74
CV STRESS CURRENT HR HE: 75
CV STRESS CURRENT HR HE: 78
CV STRESS CURRENT HR HE: 82
CV STRESS CURRENT HR HE: 82
CV STRESS CURRENT HR HE: 85
CV STRESS CURRENT HR HE: 86
CV STRESS CURRENT HR HE: 87
CV STRESS CURRENT HR HE: 88
CV STRESS CURRENT HR HE: 92
CV STRESS CURRENT HR HE: 93
CV STRESS CURRENT HR HE: 94
CV STRESS CURRENT HR HE: 96
CV STRESS DEVIATION TIME HE: NORMAL
CV STRESS ECHO PERCENT HR HE: NORMAL
CV STRESS EXERCISE STAGE HE: NORMAL
CV STRESS FINAL RESTING BP HE: NORMAL
CV STRESS FINAL RESTING HR HE: 86
CV STRESS MAX HR HE: 98
CV STRESS MAX TREADMILL GRADE HE: 0
CV STRESS MAX TREADMILL SPEED HE: 0
CV STRESS PEAK DIA BP HE: NORMAL
CV STRESS PEAK SYS BP HE: NORMAL
CV STRESS PHASE HE: NORMAL
CV STRESS PROTOCOL HE: NORMAL
CV STRESS RESTING PT POSITION HE: NORMAL
CV STRESS ST DEVIATION AMOUNT HE: NORMAL
CV STRESS ST DEVIATION ELEVATION HE: NORMAL
CV STRESS ST EVELATION AMOUNT HE: NORMAL
CV STRESS TEST TYPE HE: NORMAL
CV STRESS TOTAL STAGE TIME MIN 1 HE: NORMAL
NUC STRESS EJECTION FRACTION: 68 %
RATE PRESSURE PRODUCT: NORMAL
STRESS ECHO BASELINE DIASTOLIC HE: 70
STRESS ECHO BASELINE HR: 69
STRESS ECHO BASELINE SYSTOLIC BP: 162
STRESS ECHO CALCULATED PERCENT HR: 68 %
STRESS ECHO LAST STRESS DIASTOLIC BP: 92
STRESS ECHO LAST STRESS HR: 92
STRESS ECHO LAST STRESS SYSTOLIC BP: 189
STRESS ECHO TARGET HR: 144

## 2022-08-08 PROCEDURE — 78452 HT MUSCLE IMAGE SPECT MULT: CPT | Mod: 26 | Performed by: GENERAL ACUTE CARE HOSPITAL

## 2022-08-08 PROCEDURE — 343N000001 HC RX 343: Performed by: INTERNAL MEDICINE

## 2022-08-08 PROCEDURE — 78452 HT MUSCLE IMAGE SPECT MULT: CPT

## 2022-08-08 PROCEDURE — 250N000011 HC RX IP 250 OP 636: Performed by: INTERNAL MEDICINE

## 2022-08-08 PROCEDURE — 93017 CV STRESS TEST TRACING ONLY: CPT

## 2022-08-08 PROCEDURE — 93018 CV STRESS TEST I&R ONLY: CPT | Performed by: GENERAL ACUTE CARE HOSPITAL

## 2022-08-08 PROCEDURE — A9500 TC99M SESTAMIBI: HCPCS | Performed by: INTERNAL MEDICINE

## 2022-08-08 PROCEDURE — 93016 CV STRESS TEST SUPVJ ONLY: CPT | Performed by: INTERNAL MEDICINE

## 2022-08-08 RX ORDER — CAFFEINE 200 MG
200 TABLET ORAL
Status: DISCONTINUED | OUTPATIENT
Start: 2022-08-08 | End: 2022-08-08 | Stop reason: HOSPADM

## 2022-08-08 RX ORDER — AMINOPHYLLINE 25 MG/ML
50 INJECTION, SOLUTION INTRAVENOUS
Status: DISCONTINUED | OUTPATIENT
Start: 2022-08-08 | End: 2022-08-08 | Stop reason: HOSPADM

## 2022-08-08 RX ORDER — REGADENOSON 0.08 MG/ML
0.4 INJECTION, SOLUTION INTRAVENOUS ONCE
Status: COMPLETED | OUTPATIENT
Start: 2022-08-08 | End: 2022-08-08

## 2022-08-08 RX ORDER — ALBUTEROL SULFATE 0.83 MG/ML
2.5 SOLUTION RESPIRATORY (INHALATION)
Status: DISCONTINUED | OUTPATIENT
Start: 2022-08-08 | End: 2022-08-08 | Stop reason: HOSPADM

## 2022-08-08 RX ORDER — CAFFEINE CITRATE 20 MG/ML
60 SOLUTION INTRAVENOUS
Status: DISCONTINUED | OUTPATIENT
Start: 2022-08-08 | End: 2022-08-08 | Stop reason: HOSPADM

## 2022-08-08 RX ADMIN — REGADENOSON 0.4 MG: 0.08 INJECTION, SOLUTION INTRAVENOUS at 09:48

## 2022-08-08 RX ADMIN — Medication 9.35 MCI.: at 08:32

## 2022-08-08 RX ADMIN — Medication 33.2 MILLICURIE: at 11:06

## 2022-08-11 ENCOUNTER — OFFICE VISIT (OUTPATIENT)
Dept: INTERNAL MEDICINE | Facility: CLINIC | Age: 77
End: 2022-08-11
Payer: COMMERCIAL

## 2022-08-11 VITALS
BODY MASS INDEX: 28.42 KG/M2 | OXYGEN SATURATION: 99 % | DIASTOLIC BLOOD PRESSURE: 70 MMHG | HEART RATE: 66 BPM | HEIGHT: 71 IN | SYSTOLIC BLOOD PRESSURE: 148 MMHG | RESPIRATION RATE: 18 BRPM | WEIGHT: 203 LBS

## 2022-08-11 DIAGNOSIS — R55 SYNCOPE, UNSPECIFIED SYNCOPE TYPE: Primary | ICD-10-CM

## 2022-08-11 PROCEDURE — 99213 OFFICE O/P EST LOW 20 MIN: CPT | Performed by: INTERNAL MEDICINE

## 2022-08-11 NOTE — PROGRESS NOTES
"Assessment/Plan:    Syncope likely vasovagal.  Here in follow-up after negative Lexiscan nuclear medicine cardiac test.  Discussed reassured ejection fraction 68% no evidence for inducible myocardial ischemia with exercise.  Discussed in detail with the patient the patient is at low risk for future cardiac events.  Patient reassured.    Questions regarding COVID-19 the third booster answered.  Not yet    15 minutes spent on the date of the encounter doing chart review, patient visit and documentation     Subjective:  Matthew Platt is a 76 year old male presents for the following health issues no further syncope feels well.  Denies chest pain shortness of breath.    ROS:  No blood in stool or urine denies chest pain shortness of breath med list reviewed reconciled    Objective:  BP (!) 148/70   Pulse 66   Resp 18   Ht 1.803 m (5' 11\")   Wt 92.1 kg (203 lb)   SpO2 99%   BMI 28.31 kg/m    Limited examination carotids are full bilaterally mild ptosis noted left eye lungs clear heart tones normal mild centripetal obesity extremities free of edema    Cameron Marroquin MD  Internal Medicine    Answers for HPI/ROS submitted by the patient on 8/4/2022  What is the reason for your visit today? : Follow up previous appointment  How many servings of fruits and vegetables do you eat daily?: 2-3  On average, how many sweetened beverages do you drink each day (Examples: soda, juice, sweet tea, etc.  Do NOT count diet or artificially sweetened beverages)?: 0  How many minutes a day do you exercise enough to make your heart beat faster?: 20 to 29  How many days a week do you exercise enough to make your heart beat faster?: 3 or less  How many days per week do you miss taking your medication?: 0      "

## 2022-08-15 ENCOUNTER — TRANSFERRED RECORDS (OUTPATIENT)
Dept: HEALTH INFORMATION MANAGEMENT | Facility: CLINIC | Age: 77
End: 2022-08-15

## 2022-08-23 ENCOUNTER — TRANSFERRED RECORDS (OUTPATIENT)
Dept: HEALTH INFORMATION MANAGEMENT | Facility: CLINIC | Age: 77
End: 2022-08-23

## 2022-08-29 ENCOUNTER — TRANSFERRED RECORDS (OUTPATIENT)
Dept: HEALTH INFORMATION MANAGEMENT | Facility: CLINIC | Age: 77
End: 2022-08-29

## 2022-09-11 ENCOUNTER — HOSPITAL ENCOUNTER (OUTPATIENT)
Dept: GENERAL RADIOLOGY | Facility: HOSPITAL | Age: 77
Discharge: HOME | End: 2022-09-11
Attending: FAMILY MEDICINE | Admitting: FAMILY MEDICINE
Payer: COMMERCIAL

## 2022-09-11 ENCOUNTER — OFFICE VISIT (OUTPATIENT)
Dept: FAMILY MEDICINE | Facility: CLINIC | Age: 77
End: 2022-09-11
Payer: COMMERCIAL

## 2022-09-11 VITALS
TEMPERATURE: 98.3 F | SYSTOLIC BLOOD PRESSURE: 130 MMHG | WEIGHT: 210.3 LBS | DIASTOLIC BLOOD PRESSURE: 75 MMHG | BODY MASS INDEX: 29.33 KG/M2 | OXYGEN SATURATION: 97 % | RESPIRATION RATE: 20 BRPM | HEART RATE: 79 BPM

## 2022-09-11 DIAGNOSIS — R06.00 DYSPNEA, UNSPECIFIED TYPE: ICD-10-CM

## 2022-09-11 DIAGNOSIS — R06.02 SHORTNESS OF BREATH: Primary | ICD-10-CM

## 2022-09-11 DIAGNOSIS — J40 BRONCHITIS: ICD-10-CM

## 2022-09-11 LAB
FLUAV AG SPEC QL IA: NEGATIVE
FLUBV AG SPEC QL IA: NEGATIVE

## 2022-09-11 PROCEDURE — 87804 INFLUENZA ASSAY W/OPTIC: CPT | Performed by: FAMILY MEDICINE

## 2022-09-11 PROCEDURE — 71046 X-RAY EXAM CHEST 2 VIEWS: CPT | Mod: FY

## 2022-09-11 PROCEDURE — 99214 OFFICE O/P EST MOD 30 MIN: CPT | Performed by: FAMILY MEDICINE

## 2022-09-11 RX ORDER — PREDNISONE 20 MG/1
20 TABLET ORAL DAILY
Qty: 5 TABLET | Refills: 0 | Status: SHIPPED | OUTPATIENT
Start: 2022-09-11 | End: 2022-09-16

## 2022-09-11 RX ORDER — AZITHROMYCIN 250 MG/1
TABLET, FILM COATED ORAL
Qty: 6 TABLET | Refills: 0 | Status: SHIPPED | OUTPATIENT
Start: 2022-09-11 | End: 2022-09-16

## 2022-09-11 RX ORDER — ALBUTEROL SULFATE 90 UG/1
2 AEROSOL, METERED RESPIRATORY (INHALATION) EVERY 4 HOURS PRN
Qty: 18 G | Refills: 0 | Status: SHIPPED | OUTPATIENT
Start: 2022-09-11 | End: 2023-12-04

## 2022-09-11 ASSESSMENT — ENCOUNTER SYMPTOMS
CHEST TIGHTNESS: 0
GASTROINTESTINAL NEGATIVE: 1
MUSCULOSKELETAL NEGATIVE: 1
NEUROLOGICAL NEGATIVE: 1
PSYCHIATRIC NEGATIVE: 1
ALLERGIC/IMMUNOLOGIC NEGATIVE: 1
CONSTITUTIONAL NEGATIVE: 1
SINUS PRESSURE: 1
ENDOCRINE NEGATIVE: 1
RHINORRHEA: 1
WHEEZING: 0
EYES NEGATIVE: 1
APNEA: 0
HEMATOLOGIC/LYMPHATIC NEGATIVE: 1
PALPITATIONS: 0
SHORTNESS OF BREATH: 1

## 2022-09-11 NOTE — PROGRESS NOTES
SUBJECTIVE:   Matthew Platt is a 76 year old male presenting with a chief complaint of   Chief Complaint   Patient presents with     Shortness of Breath     Since Wednesday     Back Pain     Upper back and neck and dailey a hard time sleeping       Sinus Problem     Some drainage       He is an established patient of [facility].    URI Adult    Onset of symptoms was 5 day(s) ago.  Course of illness is waxing and waning.    Severity moderate  Current and Associated symptoms: cough - non-productive, cough - productive, shortness of breath and fatigue  Treatment measures tried include Tylenol/Ibuprofen and Decongestants.  Predisposing factors include None.      Patient is a 76 yr old male here for shortness of breath and mild cough. Shortness of breath started on Wednesday. He says he gets winded with activities like walking and moving around. Denies any chest pain or palpitations.   Review of chart reveals recent cardiac work up- including Holter monitor and ECHO which were within normal limits.He was seen by his cardiologist just over a month ago and his stress test was negative for ischemia.   Patient's past ,edical history is significant for hyperlipidemia , KENNEDY and hypertension . He reports that he tested twice for COVID and was negative on both times. His flu test was negative today.      Review of Systems   Constitutional: Negative.    HENT: Positive for congestion, postnasal drip, rhinorrhea and sinus pressure.    Eyes: Negative.    Respiratory: Positive for shortness of breath. Negative for apnea, chest tightness and wheezing.    Cardiovascular: Negative for chest pain, palpitations and leg swelling.   Gastrointestinal: Negative.    Endocrine: Negative.    Genitourinary: Negative.    Musculoskeletal: Negative.    Allergic/Immunologic: Negative.    Neurological: Negative.    Hematological: Negative.    Psychiatric/Behavioral: Negative.        Past Medical History:   Diagnosis Date     Chronic sinusitis       Hoarseness 2018     Obstructive sleep apnea      Sleep apnea 2017     Tinnitus      Family History   Problem Relation Age of Onset     Cancer Father      Pancreatic Cancer Father 87.00     Hepatitis Mother      No Known Problems Sister      No Known Problems Daughter      No Known Problems Son      Current Outpatient Medications   Medication Sig Dispense Refill     Multiple Vitamins-Minerals (PRESERVISION AREDS 2 PO)        multivitamin (CENTRUM SILVER) tablet Take 1 tablet by mouth daily       rosuvastatin (CRESTOR) 5 MG tablet Take 1 tablet (5 mg) by mouth daily 90 tablet 3     valsartan (DIOVAN) 80 MG tablet Take 80 mg by mouth daily (Patient not taking: Reported on 2022)       Social History     Tobacco Use     Smoking status: Former Smoker     Packs/day: 1.00     Years: 5.00     Pack years: 5.00     Types: Cigarettes, Cigars, Pipe     Start date: 1966     Quit date: 1974     Years since quittin.7     Smokeless tobacco: Never Used   Substance Use Topics     Alcohol use: Yes     Alcohol/week: 7.0 standard drinks     Types: 7 Glasses of wine per week       OBJECTIVE  /75 (BP Location: Left arm, Patient Position: Sitting, Cuff Size: Adult Large)   Pulse 79   Temp 98.3  F (36.8  C) (Tympanic)   Resp 20   Wt 95.4 kg (210 lb 4.8 oz)   SpO2 97%   BMI 29.33 kg/m      Physical Exam  Constitutional:       Appearance: Normal appearance.   HENT:      Head: Normocephalic and atraumatic.      Right Ear: Tympanic membrane normal.      Left Ear: Tympanic membrane normal.      Nose: Nose normal.   Eyes:      Pupils: Pupils are equal, round, and reactive to light.   Cardiovascular:      Rate and Rhythm: Normal rate and regular rhythm.   Pulmonary:      Effort: Pulmonary effort is normal.      Breath sounds: Normal breath sounds.   Skin:     General: Skin is warm and dry.   Neurological:      General: No focal deficit present.      Mental Status: He is alert and oriented to person,  place, and time.   Psychiatric:         Mood and Affect: Mood normal.         Behavior: Behavior normal.         Labs:  No results found for this or any previous visit (from the past 24 hour(s)).    X-Ray was done, my findings are: appears normal appearing. Await radiology report    ASSESSMENT:      ICD-10-CM    1. Shortness of breath  R06.02 Influenza A & B Antigen - Clinic Collect    Chest x-ray findings were discussed with the patient.   Likely a bronchitis- antibiotics , prednisone and albuterol inhaler faxed. Emphasized to the patient that if the shortness of breath persists needs to be seen immediately in the ER. There is some limitation to what can be done in the UC and patient was made aware of this.    Medical Decision Making:    Differential Diagnosis:  URI Adult/Peds:  Bronchitis  Serious Comorbid Conditions:  Adult:  None    PLAN:    URI Adult:  Rx bronchitis  azithromycin , prednsione and albuterol inhaler    Followup:    If not improving or if condition worsens, follow up with your Primary Care Provider    There are no Patient Instructions on file for this visit.

## 2022-11-03 ENCOUNTER — TRANSFERRED RECORDS (OUTPATIENT)
Dept: HEALTH INFORMATION MANAGEMENT | Facility: CLINIC | Age: 77
End: 2022-11-03

## 2022-11-29 ENCOUNTER — TRANSFERRED RECORDS (OUTPATIENT)
Dept: HEALTH INFORMATION MANAGEMENT | Facility: CLINIC | Age: 77
End: 2022-11-29

## 2022-12-09 ENCOUNTER — TRANSFERRED RECORDS (OUTPATIENT)
Dept: HEALTH INFORMATION MANAGEMENT | Facility: CLINIC | Age: 77
End: 2022-12-09

## 2022-12-23 ENCOUNTER — TRANSFERRED RECORDS (OUTPATIENT)
Dept: HEALTH INFORMATION MANAGEMENT | Facility: CLINIC | Age: 77
End: 2022-12-23

## 2022-12-26 ENCOUNTER — HEALTH MAINTENANCE LETTER (OUTPATIENT)
Age: 77
End: 2022-12-26

## 2023-05-15 ENCOUNTER — TRANSFERRED RECORDS (OUTPATIENT)
Dept: HEALTH INFORMATION MANAGEMENT | Facility: CLINIC | Age: 78
End: 2023-05-15
Payer: COMMERCIAL

## 2023-05-23 ENCOUNTER — TRANSFERRED RECORDS (OUTPATIENT)
Dept: HEALTH INFORMATION MANAGEMENT | Facility: CLINIC | Age: 78
End: 2023-05-23
Payer: COMMERCIAL

## 2023-05-30 ENCOUNTER — TRANSFERRED RECORDS (OUTPATIENT)
Dept: HEALTH INFORMATION MANAGEMENT | Facility: CLINIC | Age: 78
End: 2023-05-30
Payer: COMMERCIAL

## 2023-11-27 ASSESSMENT — ENCOUNTER SYMPTOMS
SORE THROAT: 0
SHORTNESS OF BREATH: 1
HEADACHES: 0
PALPITATIONS: 0
JOINT SWELLING: 0
WEAKNESS: 0
DYSURIA: 0
HEMATOCHEZIA: 0
FEVER: 0
NAUSEA: 0
HEMATURIA: 0
EYE PAIN: 0
COUGH: 1
ARTHRALGIAS: 1
MYALGIAS: 1
FREQUENCY: 1
DIZZINESS: 0
CHILLS: 0
ABDOMINAL PAIN: 0
HEARTBURN: 0
NERVOUS/ANXIOUS: 0
DIARRHEA: 0
PARESTHESIAS: 0
CONSTIPATION: 0

## 2023-11-27 ASSESSMENT — ACTIVITIES OF DAILY LIVING (ADL): CURRENT_FUNCTION: NO ASSISTANCE NEEDED

## 2023-12-04 ENCOUNTER — OFFICE VISIT (OUTPATIENT)
Dept: FAMILY MEDICINE | Facility: CLINIC | Age: 78
End: 2023-12-04
Payer: COMMERCIAL

## 2023-12-04 VITALS
HEART RATE: 79 BPM | TEMPERATURE: 97.1 F | SYSTOLIC BLOOD PRESSURE: 182 MMHG | WEIGHT: 204.8 LBS | HEIGHT: 71 IN | OXYGEN SATURATION: 98 % | BODY MASS INDEX: 28.67 KG/M2 | DIASTOLIC BLOOD PRESSURE: 80 MMHG

## 2023-12-04 DIAGNOSIS — Z13.220 LIPID SCREENING: ICD-10-CM

## 2023-12-04 DIAGNOSIS — E78.2 MIXED HYPERLIPIDEMIA: ICD-10-CM

## 2023-12-04 DIAGNOSIS — Z11.59 NEED FOR HEPATITIS C SCREENING TEST: ICD-10-CM

## 2023-12-04 DIAGNOSIS — I10 BENIGN ESSENTIAL HYPERTENSION: ICD-10-CM

## 2023-12-04 DIAGNOSIS — Z00.00 ENCOUNTER FOR MEDICARE ANNUAL WELLNESS EXAM: Primary | ICD-10-CM

## 2023-12-04 DIAGNOSIS — Z12.5 SCREENING FOR PROSTATE CANCER: ICD-10-CM

## 2023-12-04 DIAGNOSIS — J32.0 CHRONIC MAXILLARY SINUSITIS: ICD-10-CM

## 2023-12-04 PROBLEM — J32.9 RECURRENT SINUS INFECTIONS: Status: ACTIVE | Noted: 2023-01-09

## 2023-12-04 PROBLEM — Z86.79 HISTORY OF HIGH BLOOD PRESSURE: Status: ACTIVE | Noted: 2023-01-09

## 2023-12-04 PROBLEM — I21.4 NON-ST ELEVATION (NSTEMI) MYOCARDIAL INFARCTION (H): Status: ACTIVE | Noted: 2022-05-13

## 2023-12-04 PROCEDURE — G0438 PPPS, INITIAL VISIT: HCPCS | Performed by: NURSE PRACTITIONER

## 2023-12-04 PROCEDURE — 99214 OFFICE O/P EST MOD 30 MIN: CPT | Mod: 25 | Performed by: NURSE PRACTITIONER

## 2023-12-04 RX ORDER — FLUTICASONE PROPIONATE 50 MCG
1 SPRAY, SUSPENSION (ML) NASAL DAILY
Qty: 16 G | Refills: 4 | Status: SHIPPED | OUTPATIENT
Start: 2023-12-04

## 2023-12-04 RX ORDER — LISINOPRIL 10 MG/1
10 TABLET ORAL DAILY
Qty: 30 TABLET | Refills: 0 | Status: SHIPPED | OUTPATIENT
Start: 2023-12-04 | End: 2023-12-12 | Stop reason: DRUGHIGH

## 2023-12-04 RX ORDER — RESPIRATORY SYNCYTIAL VIRUS VACCINE 120MCG/0.5
0.5 KIT INTRAMUSCULAR ONCE
Qty: 1 EACH | Refills: 0 | Status: CANCELLED | OUTPATIENT
Start: 2023-12-04 | End: 2023-12-04

## 2023-12-04 ASSESSMENT — ENCOUNTER SYMPTOMS
SHORTNESS OF BREATH: 1
HEMATURIA: 0
HEMATOCHEZIA: 0
COUGH: 1
ABDOMINAL PAIN: 0
CONSTIPATION: 0
PALPITATIONS: 0
SORE THROAT: 0
FEVER: 0
EYE PAIN: 0
PARESTHESIAS: 0
ARTHRALGIAS: 1
DIZZINESS: 0
MYALGIAS: 1
HEADACHES: 0
FREQUENCY: 1
CHILLS: 0
JOINT SWELLING: 0
DYSURIA: 0
DIARRHEA: 0
NAUSEA: 0
HEARTBURN: 0
NERVOUS/ANXIOUS: 0
WEAKNESS: 0

## 2023-12-04 ASSESSMENT — ACTIVITIES OF DAILY LIVING (ADL): CURRENT_FUNCTION: NO ASSISTANCE NEEDED

## 2023-12-04 ASSESSMENT — PAIN SCALES - GENERAL: PAINLEVEL: MODERATE PAIN (4)

## 2023-12-04 NOTE — PATIENT INSTRUCTIONS
Elevated blood pressure.   Start taking medication as prescribed.   Continue to monitor blood pressure at home. Goal is <140/90. Let me know if it is consistently higher than that.  Follow-up with me as scheduled next week to review blood pressure numbers and any symptoms.   Continue to monitor salt intake. The DASH diet is recommended to help reduce salt intake.   Exercise as you are able. The American Heart Association recommends 30 minutes of exercise (walking or otherwise) 5 days a week.   Reduce stress as you are able.     Fasting labs this week or next.         Patient Education   Personalized Prevention Plan  You are due for the preventive services outlined below.  Your care team is available to assist you in scheduling these services.  If you have already completed any of these items, please share that information with your care team to update in your medical record.  Health Maintenance Due   Topic Date Due     Hepatitis C Screening  Never done     Diptheria Tetanus Pertussis (DTAP/TDAP/TD) Vaccine (1 - Tdap) Never done     RSV VACCINE (Pregnancy & 60+) (1 - 1-dose 60+ series) Never done     Zoster (Shingles) Vaccine (2 of 3) 05/21/2013        Patient Education   Personalized Prevention Plan  You are due for the preventive services outlined below.  Your care team is available to assist you in scheduling these services.  If you have already completed any of these items, please share that information with your care team to update in your medical record.  Health Maintenance Due   Topic Date Due     Hepatitis C Screening  Never done     Diptheria Tetanus Pertussis (DTAP/TDAP/TD) Vaccine (1 - Tdap) Never done     RSV VACCINE (Pregnancy & 60+) (1 - 1-dose 60+ series) Never done     Zoster (Shingles) Vaccine (2 of 3) 05/21/2013     Bladder Training: Care Instructions  Your Care Instructions     Bladder training is used to treat urge incontinence and stress incontinence. Urge incontinence means that the need to  urinate comes on so fast that you can't get to a toilet in time. Stress incontinence means that you leak urine because of pressure on your bladder. For example, it may happen when you laugh, cough, or lift something heavy.  Bladder training can increase how long you can wait before you have to urinate. It can also help your bladder hold more urine. And it can give you better control over the urge to urinate.  It is important to remember that bladder training takes a few weeks to a few months to make a difference. You may not see results right away, but don't give up.  Follow-up care is a key part of your treatment and safety. Be sure to make and go to all appointments, and call your doctor if you are having problems. It's also a good idea to know your test results and keep a list of the medicines you take.  How can you care for yourself at home?  Work with your doctor to come up with a bladder training program that is right for you. You may use one or more of the following methods.  Delayed urination  In the beginning, try to keep from urinating for 5 minutes after you first feel the need to go.  While you wait, take deep, slow breaths to relax. Kegel exercises can also help you delay the need to go to the bathroom.  After some practice, when you can easily wait 5 minutes to urinate, try to wait 10 minutes before you urinate.  Slowly increase the waiting period until you are able to control when you have to urinate.  Scheduled urination  Empty your bladder when you first wake up in the morning.  Schedule times throughout the day when you will urinate.  Start by going to the bathroom every hour, even if you don't need to go.  Slowly increase the time between trips to the bathroom.  When you have found a schedule that works well for you, keep doing it.  If you wake up during the night and have to urinate, do it. Apply your schedule to waking hours only.  Kegel exercises  These tighten and strengthen pelvic muscles,  "which can help you control the flow of urine. (If doing these exercises causes pain, stop doing them and talk with your doctor.) To do Kegel exercises:  Squeeze your muscles as if you were trying not to pass gas. Or squeeze your muscles as if you were stopping the flow of urine. Your belly, legs, and buttocks shouldn't move.  Hold the squeeze for 3 seconds, then relax for 5 to 10 seconds.  Start with 3 seconds, then add 1 second each week until you are able to squeeze for 10 seconds.  Repeat the exercise 10 times a session. Do 3 to 8 sessions a day.  When should you call for help?  Watch closely for changes in your health, and be sure to contact your doctor if:    Your incontinence is getting worse.     You do not get better as expected.   Where can you learn more?  Go to https://www.[employer].net/patiented  Enter V684 in the search box to learn more about \"Bladder Training: Care Instructions.\"  Current as of: February 28, 2023               Content Version: 13.8    8927-5292 [employer].   Care instructions adapted under license by your healthcare professional. If you have questions about a medical condition or this instruction, always ask your healthcare professional. [employer] disclaims any warranty or liability for your use of this information.         "

## 2023-12-07 ENCOUNTER — LAB (OUTPATIENT)
Dept: LAB | Facility: CLINIC | Age: 78
End: 2023-12-07
Payer: COMMERCIAL

## 2023-12-07 DIAGNOSIS — Z13.220 LIPID SCREENING: ICD-10-CM

## 2023-12-07 DIAGNOSIS — Z12.5 SCREENING FOR PROSTATE CANCER: ICD-10-CM

## 2023-12-07 DIAGNOSIS — I10 BENIGN ESSENTIAL HYPERTENSION: ICD-10-CM

## 2023-12-07 DIAGNOSIS — Z11.59 NEED FOR HEPATITIS C SCREENING TEST: ICD-10-CM

## 2023-12-07 LAB
ALBUMIN SERPL BCG-MCNC: 4.3 G/DL (ref 3.5–5.2)
ALP SERPL-CCNC: 70 U/L (ref 40–150)
ALT SERPL W P-5'-P-CCNC: 20 U/L (ref 0–70)
ANION GAP SERPL CALCULATED.3IONS-SCNC: 10 MMOL/L (ref 7–15)
AST SERPL W P-5'-P-CCNC: 28 U/L (ref 0–45)
BILIRUB SERPL-MCNC: 1 MG/DL
BUN SERPL-MCNC: 21.3 MG/DL (ref 8–23)
CALCIUM SERPL-MCNC: 9.3 MG/DL (ref 8.8–10.2)
CHLORIDE SERPL-SCNC: 102 MMOL/L (ref 98–107)
CHOLEST SERPL-MCNC: 235 MG/DL
CREAT SERPL-MCNC: 1 MG/DL (ref 0.67–1.17)
DEPRECATED HCO3 PLAS-SCNC: 26 MMOL/L (ref 22–29)
EGFRCR SERPLBLD CKD-EPI 2021: 77 ML/MIN/1.73M2
FASTING STATUS PATIENT QL REPORTED: YES
GLUCOSE SERPL-MCNC: 96 MG/DL (ref 70–99)
HDLC SERPL-MCNC: 57 MG/DL
LDLC SERPL CALC-MCNC: 160 MG/DL
NONHDLC SERPL-MCNC: 178 MG/DL
POTASSIUM SERPL-SCNC: 4.6 MMOL/L (ref 3.4–5.3)
PROT SERPL-MCNC: 7.8 G/DL (ref 6.4–8.3)
PSA SERPL DL<=0.01 NG/ML-MCNC: 2 NG/ML (ref 0–6.5)
SODIUM SERPL-SCNC: 138 MMOL/L (ref 135–145)
TRIGL SERPL-MCNC: 88 MG/DL

## 2023-12-07 PROCEDURE — G0103 PSA SCREENING: HCPCS

## 2023-12-07 PROCEDURE — 80053 COMPREHEN METABOLIC PANEL: CPT

## 2023-12-07 PROCEDURE — 86803 HEPATITIS C AB TEST: CPT

## 2023-12-07 PROCEDURE — 36415 COLL VENOUS BLD VENIPUNCTURE: CPT

## 2023-12-07 PROCEDURE — 80061 LIPID PANEL: CPT

## 2023-12-08 LAB — HCV AB SERPL QL IA: NONREACTIVE

## 2023-12-12 ENCOUNTER — VIRTUAL VISIT (OUTPATIENT)
Dept: FAMILY MEDICINE | Facility: CLINIC | Age: 78
End: 2023-12-12
Payer: COMMERCIAL

## 2023-12-12 DIAGNOSIS — I10 BENIGN ESSENTIAL HYPERTENSION: Primary | ICD-10-CM

## 2023-12-12 PROCEDURE — 99214 OFFICE O/P EST MOD 30 MIN: CPT | Mod: 95 | Performed by: NURSE PRACTITIONER

## 2023-12-12 RX ORDER — LISINOPRIL 20 MG/1
20 TABLET ORAL DAILY
Qty: 90 TABLET | Refills: 1 | Status: SHIPPED | OUTPATIENT
Start: 2023-12-12 | End: 2024-06-06

## 2023-12-12 ASSESSMENT — ENCOUNTER SYMPTOMS
RESPIRATORY NEGATIVE: 1
CONSTITUTIONAL NEGATIVE: 1

## 2023-12-12 NOTE — PROGRESS NOTES
Carmelo is a 78 year old who is being evaluated via a billable telephone visit.      What phone number would you like to be contacted at? [phone]  How would you like to obtain your AVS? Michael    Distant Location (provider location):  On-site    Assessment & Plan     Benign essential hypertension  Taking medication daily. Blood pressures are still elevated. He is monitoring it at least once a day at home. Increased lisinopril from 10mg to 20mg. Advised that if after a week, his BP is still consistently >140/90, okay to increase to 1.5 tablets. Instructed to let me know so I can update his prescription. Follow-up with me when he gets back from travels.     - lisinopril (ZESTRIL) 20 MG tablet; Take 1 tablet (20 mg) by mouth daily    Prescription drug management         Patient Instructions   Elevated blood pressure.   Increase lisinopril from 10mg to 20mg daily as we discussed.   Continue to monitor blood pressure at home. Goal is <140/90. Let me know if it is consistently higher than that.   If after a week of being on 20mg your blood pressure is still >140/90, you can go ahead and take 1.5 tablets (30mg) daily. Let me know so I can update your prescription.  Continue to monitor salt intake. The DASH diet is recommended to help reduce salt intake.   Exercise as you are able. The American Heart Association recommends 30 minutes of exercise (walking or otherwise) 5 days a week.   Reduce stress as you are able.       Melia Torre [facility]   Carmelo is a 78 year old, presenting for the following health issues:  Hypertension        12/12/2023    10:29 AM   Additional Questions   Roomed by Karolyn   Accompanied by none         12/12/2023    10:29 AM   Patient Reported Additional Medications   Patient reports taking the following new medications none       History of Present Illness       He eats 4 or more servings of fruits and vegetables daily.He consumes 1 sweetened  beverage(s) daily.He exercises with enough effort to increase his heart rate 10 to 19 minutes per day.  He exercises with enough effort to increase his heart rate 5 days per week.   He is taking medications regularly.       Hypertension Follow-up    Do you check your blood pressure regularly outside of the clinic? Yes   Are you following a low salt diet? No  Are your blood pressures ever more than 140 on the top number (systolic) OR more   than 90 on the bottom number (diastolic), for example 140/90? Yes    163/77, 171/81, 142/79, 178/74, 145/67      Additional provider notes: Patient presents virtually via telephone for BP follow-up. States his old machine he thought wasn't working well so he changed the batteries then got a new machine and he still is concerned b/c the blood pressures are all over the place. States he sometimes is sitting for 5 minutes before taking it, but not always.     [place] are [place] the [place] after [place] so isn't able to follow-up with me before he leaves.     No Known Allergies    Current Outpatient Medications   Medication    fluticasone (FLONASE) 50 MCG/ACT nasal spray    lisinopril (ZESTRIL) 20 MG tablet    Multiple Vitamins-Minerals (PRESERVISION AREDS 2 PO)    multivitamin (CENTRUM SILVER) tablet     No current facility-administered medications for this visit.       Past Medical History:   Diagnosis Date    Chronic sinusitis     Hoarseness 09/01/2018    Obstructive sleep apnea     Sleep apnea 09/01/2017    Tinnitus             Review of Systems   Constitutional: Negative.    Respiratory: Negative.     All other systems reviewed and are negative.           Objective           Vitals:  No vitals were obtained today due to virtual visit.    Physical Exam   healthy, alert, no distress, and cooperative  PSYCH: Alert and oriented times 3; coherent speech, normal   rate and volume, able to articulate logical thoughts, able   to abstract reason, no tangential thoughts, no hallucinations    or delusions  His affect is normal and pleasant  RESP: No cough, no audible wheezing, able to talk in full sentences  Remainder of exam unable to be completed due to telephone visits                Phone call duration: 8 minutes

## 2023-12-12 NOTE — PATIENT INSTRUCTIONS
Elevated blood pressure.   Increase lisinopril from 10mg to 20mg daily as we discussed.   Continue to monitor blood pressure at home. Goal is <140/90. Let me know if it is consistently higher than that.   If after a week of being on 20mg your blood pressure is still >140/90, you can go ahead and take 1.5 tablets (30mg) daily. Let me know so I can update your prescription.  Continue to monitor salt intake. The DASH diet is recommended to help reduce salt intake.   Exercise as you are able. The American Heart Association recommends 30 minutes of exercise (walking or otherwise) 5 days a week.   Reduce stress as you are able.

## 2023-12-14 ENCOUNTER — TRANSFERRED RECORDS (OUTPATIENT)
Dept: HEALTH INFORMATION MANAGEMENT | Facility: CLINIC | Age: 78
End: 2023-12-14
Payer: COMMERCIAL

## 2023-12-20 ENCOUNTER — MYC MEDICAL ADVICE (OUTPATIENT)
Dept: FAMILY MEDICINE | Facility: CLINIC | Age: 78
End: 2023-12-20
Payer: COMMERCIAL

## 2024-04-12 ENCOUNTER — TELEPHONE (OUTPATIENT)
Dept: FAMILY MEDICINE | Facility: CLINIC | Age: 79
End: 2024-04-12
Payer: COMMERCIAL

## 2024-04-12 NOTE — LETTER
May 6, 2024    To  Matthew Platt  [address]  [facility] [address]    Your team at [facility] cares about your health. We have reviewed your chart and based on our findings; we are making the following recommendations to better manage your health.     You are in particular need of attention regarding the following:     HYPERTENSION FOLLOW UP: Office Visit    If you have already completed these items, please contact the clinic via phone or   MyChart so your care team can review and update your records. Thank you for   choosing [facility] for your healthcare needs. For any questions,   concerns, or to schedule an appointment please contact our clinic.    Healthy Regards,      Your [facility] Care Team

## 2024-04-12 NOTE — TELEPHONE ENCOUNTER
Patient Quality Outreach    Patient is due for the following:   Hypertension -  Hypertension follow-up visit      Topic Date Due    Diptheria Tetanus Pertussis (DTAP/TDAP/TD) Vaccine (1 - Tdap) Never done    Zoster (Shingles) Vaccine (2 of 3) 05/21/2013       Next Steps:   Schedule a office visit for blood pressure check    Type of outreach:    Sent [employer] message.    Next Steps:  Reach out within 90 days via Letter.    Max number of attempts reached: Yes. Will try again in 90 days if patient still on fail list.    Questions for provider review:    None           Karolyn Bates MA  Chart routed to self .

## 2024-05-06 NOTE — TELEPHONE ENCOUNTER
Patient Quality Outreach    Patient is due for the following:   Hypertension -  Hypertension follow-up visit      Topic Date Due    Diptheria Tetanus Pertussis (DTAP/TDAP/TD) Vaccine (1 - Tdap) Never done    Zoster (Shingles) Vaccine (2 of 3) 05/21/2013    COVID-19 Vaccine (7 - 2023-24 season) 02/09/2024       Next Steps:   Schedule a office visit for blood pressure check    Type of outreach:    Sent letter.    Next Steps:  Reach out within 90 days via Phone.    Max number of attempts reached: Yes. Will try again in 90 days if patient still on fail list.    Questions for provider review:    None           Karolyn Bates MA  Chart routed to none .

## 2024-05-24 ENCOUNTER — OFFICE VISIT (OUTPATIENT)
Dept: FAMILY MEDICINE | Facility: CLINIC | Age: 79
End: 2024-05-24
Payer: COMMERCIAL

## 2024-05-24 VITALS
DIASTOLIC BLOOD PRESSURE: 79 MMHG | SYSTOLIC BLOOD PRESSURE: 189 MMHG | TEMPERATURE: 98.2 F | RESPIRATION RATE: 18 BRPM | BODY MASS INDEX: 27.65 KG/M2 | OXYGEN SATURATION: 97 % | HEART RATE: 89 BPM | WEIGHT: 195.5 LBS
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
## 2024-05-24 DIAGNOSIS — S20.361A TICK BITE OF RIGHT SIDE OF CHEST WALL, INITIAL ENCOUNTER: ICD-10-CM

## 2024-05-24 DIAGNOSIS — J06.9 VIRAL URI WITH COUGH: Primary | ICD-10-CM

## 2024-05-24 PROCEDURE — 99213 OFFICE O/P EST LOW 20 MIN: CPT | Performed by: STUDENT IN AN ORGANIZED HEALTH CARE EDUCATION/TRAINING PROGRAM

## 2024-05-24 RX ORDER — DOXYCYCLINE HYCLATE 100 MG
100 TABLET ORAL 2 TIMES DAILY
Qty: 20 TABLET | Refills: 0 | Status: SHIPPED | OUTPATIENT
Start: 2024-05-24 | End: 2024-06-03

## 2024-05-24 RX ORDER — BENZONATATE 100 MG/1
200 CAPSULE ORAL 3 TIMES DAILY PRN
Qty: 30 CAPSULE | Refills: 0 | Status: SHIPPED | OUTPATIENT
Start: 2024-05-24

## 2024-05-24 NOTE — PROGRESS NOTES
Assessment & Plan     Viral URI with cough  Will treat patient with supportive and symptomatic measures for sinusitis at this time which include: Fluids, rest, over-the-counter medications: decongestants, antihistamines,  and expectorants, side effects of medications reviewed.   I discussed with patient that I suspect their symptoms are related to a viral or allergic sinusitis,  Educated patient if no improvement on the antibiotics to consider alternative sinusitis such as viral or allergic and other treatment modalities. Patient is being prescribed doxycyline for tick bite and this should cover bacterial sinusitis if it is present.  Additionally we discussed if symptoms do not improve after starting today's treatment (or if symptoms worsen) to follow up in 5-7 days.   - benzonatate (TESSALON) 100 MG capsule  Dispense: 30 capsule; Refill: 0    Tick bite of right side of chest wall, initial encounter  Patient disposed of the tick and removed it two days ago. Will plan for treatment with doxycyline for 10 days as prophylaxis as this is within the 72 hour window. Unsure of how long the tick was attached prior to removal.   - doxycycline hyclate (VIBRA-TABS) 100 MG tablet  Dispense: 20 tablet; Refill: 0     22 minutes spent by me on the date of the encounter doing chart review, history and exam, documentation and further activities per the note. Billed based on complexity of care.     No follow-ups on file.    Catherine Velez MD  [facility] AVERY Rhodes is a 78 year old male who presents to clinic today for the following health issues:  Chief Complaint   Patient presents with    Insect Bites     2 days Tick bite on back.    Cough     4 days cough.     HPI    Has had sinusitis for a long time with a light cough most of the time. Cough has been worsening. Negative home COVID testing.     URI Adult    Onset of symptoms was 3 day(s) ago.  Course of illness is worsening.    Severity  moderate  Current and Associated symptoms: runny nose, cough - productive, sore throat, hoarse voice, and fatigue  Treatment measures tried include Cough medication from winter and cough drops.  Predisposing factors include HX of chronic sinusitis.    Bite/Sting    Onset of symptoms was 2 day(s) ago. Took the dog out for a walk to an off leash dog park, plays golf and has been walking around in long grass.   Course of illness is same.    Severity mild  Location: right chest  Context: pulled a tick off  Current and Associated symptoms: itching and redness  Treatment measures tried include: nothing  Significant past medical history: N/A  Doesn't know for sure when the tick bite occurred but noticed it two days ago and the tick was still attached but easy to come off.     Review of Systems  Constitutional, HEENT, cardiovascular, pulmonary, gi and gu systems are negative, except as otherwise noted.      Objective    BP (!) 189/79   Pulse 89   Temp 98.2  F (36.8  C) (Oral)   Resp 18   Wt 88.7 kg (195 lb 8 oz)   SpO2 97%   BMI 27.65 kg/m    Physical Exam   GENERAL: alert and no distress  EYES: Eyes grossly normal to inspection, PERRL and conjunctivae and sclerae normal  HENT: normal cephalic/atraumatic, ear canals and TM's normal, nose and mouth without ulcers or lesions, nasal mucosa edematous , rhinorrhea clear, white, and thick, oropharynx clear, oral mucous membranes moist, and sinuses: not tender, frontal swelling on both sides  NECK: no adenopathy and no asymmetry, masses, or scars  RESP: lungs clear to auscultation - no rales, rhonchi or wheezes  CV: regular rate and rhythm, normal S1 S2, no S3 or S4, no murmur, click or rub, no peripheral edema, reddish lesion with central punctate lesion  ABDOMEN: soft, nontender, no hepatosplenomegaly, no masses and bowel sounds normal  MS: no gross musculoskeletal defects noted, no edema

## 2024-06-06 DIAGNOSIS — I10 BENIGN ESSENTIAL HYPERTENSION: ICD-10-CM

## 2024-06-06 RX ORDER — LISINOPRIL 20 MG/1
20 TABLET ORAL DAILY
Qty: 30 TABLET | Refills: 0 | Status: SHIPPED | OUTPATIENT
Start: 2024-06-06 | End: 2024-07-06

## 2024-06-06 NOTE — TELEPHONE ENCOUNTER
30 day refill sent.     Need follow-up with PCP. I'm not sure if he's seeing me now or still following with Dr. Marroquin. Either way, his last BP a month ago was way too high. Please get him in with one of us to adjust medication.     Thanks,  Melia Torre, HARVINDER, NP-C

## 2024-06-07 NOTE — TELEPHONE ENCOUNTER
Patient does want Melia Torre to be his PCP.  Called patient and he did not want to schedule a follow up at this time.  Patient said he has been checking his blood pressures at home and they have been fine.  Patient will call back when he gets closer to finishing up his medication and schedule a follow up.    JORGE Arreguin  [facility]

## 2024-06-11 ENCOUNTER — TELEPHONE (OUTPATIENT)
Dept: FAMILY MEDICINE | Facility: CLINIC | Age: 79
End: 2024-06-11

## 2024-06-11 NOTE — TELEPHONE ENCOUNTER
Patient Quality Outreach    Patient is due for the following:   Hypertension -  Hypertension follow-up visit      Topic Date Due    Diptheria Tetanus Pertussis (DTAP/TDAP/TD) Vaccine (1 - Tdap) Never done    Zoster (Shingles) Vaccine (2 of 3) 05/21/2013    COVID-19 Vaccine (7 - 2023-24 season) 02/09/2024       Next Steps:   Schedule a office visit for blood pressure check    Type of outreach:    Sent [employer] message.    Next Steps:  Reach out within 90 days via Letter.    Max number of attempts reached: No. Will try again in 90 days if patient still on fail list.    Questions for provider review:    None           Karolyn Bates MA  Chart routed to self .

## 2024-07-03 ENCOUNTER — TELEPHONE (OUTPATIENT)
Dept: FAMILY MEDICINE | Facility: CLINIC | Age: 79
End: 2024-07-03
Payer: COMMERCIAL

## 2024-07-03 DIAGNOSIS — I10 BENIGN ESSENTIAL HYPERTENSION: ICD-10-CM

## 2024-07-06 RX ORDER — LISINOPRIL 20 MG/1
20 TABLET ORAL DAILY
Qty: 30 TABLET | Refills: 0 | Status: SHIPPED | OUTPATIENT
Start: 2024-07-06 | End: 2024-08-01

## 2024-07-06 NOTE — TELEPHONE ENCOUNTER
Please help patient schedule follow-up visit. 30 day belkys refill. I haven't seen him in 6 months and that was virtual. Last blood pressure on record is too high.     Thanks,  Melia Torre DNP, NP-C

## 2024-07-08 NOTE — TELEPHONE ENCOUNTER
Called patient and left a message with patients spouse that I was calling to help him schedule a med check - blood pressure appointment.    JORGE Arreguin  [facility]

## 2024-07-12 NOTE — TELEPHONE ENCOUNTER
Patient Quality Outreach    Patient is due for the following:   Hypertension -  Hypertension follow-up visit    Next Steps:   No follow up needed at this time. Has appt scheduled 08/01/24    Type of outreach:    Chart review performed, no outreach needed.    Next Steps:  Reach out within 90 days via Phone.    Max number of attempts reached: Yes. Will try again in 90 days if patient still on fail list.    Questions for provider review:    None           Karolyn Bates MA  Chart routed to none .

## 2024-08-01 ENCOUNTER — OFFICE VISIT (OUTPATIENT)
Dept: FAMILY MEDICINE | Facility: CLINIC | Age: 79
End: 2024-08-01
Payer: COMMERCIAL

## 2024-08-01 VITALS
DIASTOLIC BLOOD PRESSURE: 75 MMHG | HEIGHT: 71 IN | HEART RATE: 84 BPM | BODY MASS INDEX: 28.19 KG/M2 | SYSTOLIC BLOOD PRESSURE: 135 MMHG | TEMPERATURE: 98.2 F | WEIGHT: 201.4 LBS | RESPIRATION RATE: 20 BRPM | OXYGEN SATURATION: 99 %

## 2024-08-01 DIAGNOSIS — I10 BENIGN ESSENTIAL HYPERTENSION: ICD-10-CM

## 2024-08-01 LAB
ANION GAP SERPL CALCULATED.3IONS-SCNC: 9 MMOL/L (ref 7–15)
BUN SERPL-MCNC: 24.4 MG/DL (ref 8–23)
CALCIUM SERPL-MCNC: 9.4 MG/DL (ref 8.8–10.4)
CHLORIDE SERPL-SCNC: 105 MMOL/L (ref 98–107)
CREAT SERPL-MCNC: 0.97 MG/DL (ref 0.67–1.17)
EGFRCR SERPLBLD CKD-EPI 2021: 80 ML/MIN/1.73M2
GLUCOSE SERPL-MCNC: 72 MG/DL (ref 70–99)
HCO3 SERPL-SCNC: 24 MMOL/L (ref 22–29)
POTASSIUM SERPL-SCNC: 5.1 MMOL/L (ref 3.4–5.3)
SODIUM SERPL-SCNC: 138 MMOL/L (ref 135–145)

## 2024-08-01 PROCEDURE — 80048 BASIC METABOLIC PNL TOTAL CA: CPT | Performed by: NURSE PRACTITIONER

## 2024-08-01 PROCEDURE — 99214 OFFICE O/P EST MOD 30 MIN: CPT | Performed by: NURSE PRACTITIONER

## 2024-08-01 PROCEDURE — 36415 COLL VENOUS BLD VENIPUNCTURE: CPT | Performed by: NURSE PRACTITIONER

## 2024-08-01 RX ORDER — LISINOPRIL 20 MG/1
30 TABLET ORAL DAILY
Qty: 135 TABLET | Refills: 1 | Status: SHIPPED | OUTPATIENT
Start: 2024-08-01

## 2024-08-01 ASSESSMENT — PAIN SCALES - GENERAL: PAINLEVEL: NO PAIN (0)

## 2024-08-01 NOTE — PROGRESS NOTES
"  Assessment & Plan     Benign essential hypertension  Still elevated in clinic today and at home. Increased dose from 20mg to 30mg of lisinopril. If blood pressure at home is stable after this, then follow-up in 6 months. If still elevated, recommend follow-up. Rechecking kidney function today.     - Basic metabolic panel  (Ca, Cl, CO2, Creat, Gluc, K, Na, BUN); Future  - lisinopril (ZESTRIL) 20 MG tablet; Take 1.5 tablets (30 mg) by mouth daily          BMI  Estimated body mass index is 28.49 kg/m  as calculated from the following:    Height as of this encounter: 1.791 m (5' 10.5\").    Weight as of this encounter: 91.4 kg (201 lb 6.4 oz).         Patient Instructions   Elevated blood pressure.   Start taking medication as prescribed.   Continue to monitor blood pressure at home. Goal is <140/90. Let me know if it is consistently higher than that.  Continue to monitor salt intake. The DASH diet is recommended to help reduce salt intake.   Exercise as you are able. The American Heart Association recommends 30 minutes of exercise (walking or otherwise) 5 days a week.   Reduce stress as you are able.     Labs done today. Those will be available via [employer] within the next couple days and you will get a response from me shortly thereafter.       Kelly Rhodes is a 78 year old, presenting for the following health issues:  Hypertension        8/1/2024     8:30 AM   Additional Questions   Roomed by Karolyn   Accompanied by none         8/1/2024     8:30 AM   Patient Reported Additional Medications   Patient reports taking the following new medications none     History of Present Illness       Hypertension: He presents for follow up of hypertension.  He does check blood pressure  regularly outside of the clinic. Outside blood pressures have been over 140/90. He does not follow a low salt diet.     He eats 4 or more servings of fruits and vegetables daily.He consumes 1 sweetened beverage(s) daily.He exercises with enough " "effort to increase his heart rate 9 or less minutes per day.  He exercises with enough effort to increase his heart rate 4 days per week.   He is taking medications regularly.           Additional provider notes: Patient presents in clinic for follow-up on the following:     HTN: lisinopril, home blood pressures are running 130-140s. Denies cp or shortness of breath.     No Known Allergies    Current Outpatient Medications   Medication Sig Dispense Refill    fluticasone (FLONASE) 50 MCG/ACT nasal spray Spray 1 spray into both nostrils daily 16 g 4    lisinopril (ZESTRIL) 20 MG tablet TAKE 1 TABLET (20 MG) BY MOUTH DAILY NEED OFFICE VISIT 30 tablet 0    Multiple Vitamins-Minerals (PRESERVISION AREDS 2 PO)       benzonatate (TESSALON) 100 MG capsule Take 2 capsules (200 mg) by mouth 3 times daily as needed for cough (Patient not taking: Reported on 8/1/2024) 30 capsule 0    multivitamin (CENTRUM SILVER) tablet Take 1 tablet by mouth daily (Patient not taking: Reported on 8/1/2024)       No current facility-administered medications for this visit.       Past Medical History:   Diagnosis Date    Chronic sinusitis     Hoarseness 09/01/2018    Obstructive sleep apnea     Sleep apnea 09/01/2017    Tinnitus             Review of Systems  Constitutional, HEENT, cardiovascular, pulmonary, gi and gu systems are negative, except as otherwise noted.      Objective    /75 (BP Location: Right arm, Patient Position: Sitting, Cuff Size: Adult Large)   Pulse 84   Temp 98.2  F (36.8  C) (Oral)   Resp 20   Ht 1.791 m (5' 10.5\")   Wt 91.4 kg (201 lb 6.4 oz)   SpO2 99%   BMI 28.49 kg/m    Body mass index is 28.49 kg/m .  Physical Exam  Vitals reviewed.   Constitutional:       General: He is not in acute distress.     Appearance: Normal appearance. He is not ill-appearing or toxic-appearing.   Cardiovascular:      Rate and Rhythm: Normal rate and regular rhythm.      Pulses: Normal pulses.      Heart sounds: Normal heart " sounds.   Musculoskeletal:         General: Normal range of motion.   Skin:     General: Skin is warm and dry.   Neurological:      Mental Status: He is alert and oriented to person, place, and time.   Psychiatric:         Behavior: Behavior normal.                    Signed Electronically by: Melia Torre DNP

## 2024-08-01 NOTE — PATIENT INSTRUCTIONS
Elevated blood pressure.   Start taking medication as prescribed.   Continue to monitor blood pressure at home. Goal is <140/90. Let me know if it is consistently higher than that.  Continue to monitor salt intake. The DASH diet is recommended to help reduce salt intake.   Exercise as you are able. The American Heart Association recommends 30 minutes of exercise (walking or otherwise) 5 days a week.   Reduce stress as you are able.     Labs done today. Those will be available via [employer] within the next couple days and you will get a response from me shortly thereafter.      Cyclosporine Pregnancy And Lactation Text: This medication is Pregnancy Category C and it isn't know if it is safe during pregnancy. This medication is excreted in breast milk.

## 2024-08-05 DIAGNOSIS — R89.9 ABNORMAL LABORATORY TEST RESULT: ICD-10-CM

## 2024-08-05 DIAGNOSIS — I10 BENIGN ESSENTIAL HYPERTENSION: Primary | ICD-10-CM

## 2024-08-06 ENCOUNTER — TRANSFERRED RECORDS (OUTPATIENT)
Dept: HEALTH INFORMATION MANAGEMENT | Facility: CLINIC | Age: 79
End: 2024-08-06
Payer: COMMERCIAL

## 2024-10-22 ENCOUNTER — LAB (OUTPATIENT)
Dept: LAB | Facility: CLINIC | Age: 79
End: 2024-10-22
Payer: COMMERCIAL

## 2024-10-22 DIAGNOSIS — R89.9 ABNORMAL LABORATORY TEST RESULT: ICD-10-CM

## 2024-10-22 DIAGNOSIS — I10 BENIGN ESSENTIAL HYPERTENSION: ICD-10-CM

## 2024-10-22 LAB
ANION GAP SERPL CALCULATED.3IONS-SCNC: 12 MMOL/L (ref 7–15)
BUN SERPL-MCNC: 16.3 MG/DL (ref 8–23)
CALCIUM SERPL-MCNC: 9.2 MG/DL (ref 8.8–10.4)
CHLORIDE SERPL-SCNC: 104 MMOL/L (ref 98–107)
CREAT SERPL-MCNC: 1.01 MG/DL (ref 0.67–1.17)
EGFRCR SERPLBLD CKD-EPI 2021: 76 ML/MIN/1.73M2
GLUCOSE SERPL-MCNC: 94 MG/DL (ref 70–99)
HCO3 SERPL-SCNC: 22 MMOL/L (ref 22–29)
POTASSIUM SERPL-SCNC: 5 MMOL/L (ref 3.4–5.3)
SODIUM SERPL-SCNC: 138 MMOL/L (ref 135–145)

## 2024-10-22 PROCEDURE — 80048 BASIC METABOLIC PNL TOTAL CA: CPT

## 2024-10-22 PROCEDURE — 36415 COLL VENOUS BLD VENIPUNCTURE: CPT

## 2024-11-04 ENCOUNTER — PATIENT OUTREACH (OUTPATIENT)
Dept: CARE COORDINATION | Facility: CLINIC | Age: 79
End: 2024-11-04
Payer: COMMERCIAL

## 2024-11-06 ENCOUNTER — TRANSFERRED RECORDS (OUTPATIENT)
Dept: HEALTH INFORMATION MANAGEMENT | Facility: CLINIC | Age: 79
End: 2024-11-06
Payer: COMMERCIAL

## 2024-11-18 ENCOUNTER — PATIENT OUTREACH (OUTPATIENT)
Dept: CARE COORDINATION | Facility: CLINIC | Age: 79
End: 2024-11-18
Payer: COMMERCIAL

## 2024-12-11 ENCOUNTER — LAB REQUISITION (OUTPATIENT)
Dept: LAB | Facility: CLINIC | Age: 79
End: 2024-12-11
Payer: COMMERCIAL

## 2024-12-11 DIAGNOSIS — J32.9 CHRONIC SINUSITIS, UNSPECIFIED: ICD-10-CM

## 2024-12-11 DIAGNOSIS — K21.9 GASTRO-ESOPHAGEAL REFLUX DISEASE WITHOUT ESOPHAGITIS: ICD-10-CM

## 2024-12-11 DIAGNOSIS — R05.3 CHRONIC COUGH: ICD-10-CM

## 2024-12-11 DIAGNOSIS — R09.82 POSTNASAL DRIP: ICD-10-CM

## 2024-12-13 ENCOUNTER — TRANSFERRED RECORDS (OUTPATIENT)
Dept: HEALTH INFORMATION MANAGEMENT | Facility: CLINIC | Age: 79
End: 2024-12-13
Payer: COMMERCIAL

## 2024-12-19 LAB — BACTERIA SPEC CULT: ABNORMAL

## 2024-12-21 LAB — BACTERIA SPEC CULT: ABNORMAL

## 2024-12-31 LAB — ORGANISM (ARUP): ABNORMAL

## 2025-01-04 ENCOUNTER — HEALTH MAINTENANCE LETTER (OUTPATIENT)
Age: 80
End: 2025-01-04

## 2025-01-09 LAB — BACTERIA SPEC CULT: ABNORMAL

## 2025-05-06 ENCOUNTER — TRANSFERRED RECORDS (OUTPATIENT)
Dept: HEALTH INFORMATION MANAGEMENT | Facility: CLINIC | Age: 80
End: 2025-05-06
Payer: COMMERCIAL

## 2025-05-23 ENCOUNTER — TELEPHONE (OUTPATIENT)
Dept: FAMILY MEDICINE | Facility: CLINIC | Age: 80
End: 2025-05-23
Payer: COMMERCIAL

## 2025-05-23 NOTE — TELEPHONE ENCOUNTER
Patient Quality Outreach    Patient is due for the following:   Physical Annual Wellness Visit      Topic Date Due    Diptheria Tetanus Pertussis (DTAP/TDAP/TD) Vaccine (1 - Tdap) Never done    COVID-19 Vaccine (9 - 2024-25 season) 09/01/2024    Zoster (Shingles) Vaccine (3 of 3) 10/16/2024       Action(s) Taken:   Schedule a Annual Wellness Visit    Type of outreach:    Sent [employer] message.    Questions for provider review:    None         Karolyn Bates MA  Chart routed to self .

## 2025-05-23 NOTE — LETTER
June 16, 2025    Matthew Platt    [address]  [facility] [address]    Hello,     Your care team at [facility] values your health and well-being. After reviewing your chart, we have identified recommendation(s) to help you better manage your health.    It's time for your Medicare AWV. During your visit, we'll discuss your health, well-being, and any questions you may have related to preventive care. We'll also review any recommended tests, exams, or screenings you might need. To schedule please call your clinic [phone] or use your [employer] account.    If you recently had or are having any of these services soon, please contact the clinic via phone or [employer] so that your care team can update your records.    We look forward to seeing you at your upcoming visit.    If you have any questions or concerns, please contact our clinic. Thank you for continuing to trust us with your care.    Sincerely,    Your [facility] Care Team           Electronically signed

## 2025-06-09 ENCOUNTER — MYC REFILL (OUTPATIENT)
Dept: FAMILY MEDICINE | Facility: CLINIC | Age: 80
End: 2025-06-09
Payer: COMMERCIAL

## 2025-06-09 DIAGNOSIS — I10 BENIGN ESSENTIAL HYPERTENSION: ICD-10-CM

## 2025-06-09 RX ORDER — LISINOPRIL 20 MG/1
30 TABLET ORAL DAILY
Qty: 135 TABLET | Refills: 0 | Status: SHIPPED | OUTPATIENT
Start: 2025-06-09

## 2025-06-16 NOTE — TELEPHONE ENCOUNTER
Patient Quality Outreach    Patient is due for the following:   Physical Annual Wellness Visit      Topic Date Due    Diptheria Tetanus Pertussis (DTAP/TDAP/TD) Vaccine (1 - Tdap) Never done    COVID-19 Vaccine (9 - 2024-25 season) 09/01/2024    Zoster (Shingles) Vaccine (3 of 3) 10/16/2024       Action(s) Taken:   Schedule a Annual Wellness Visit    Type of outreach:    Sent letter.    Questions for provider review:    None         Karolyn Bates MA  Chart routed to None.

## 2025-06-19 ENCOUNTER — LAB REQUISITION (OUTPATIENT)
Dept: LAB | Facility: CLINIC | Age: 80
End: 2025-06-19
Payer: COMMERCIAL

## 2025-06-19 DIAGNOSIS — R09.82 POSTNASAL DRIP: ICD-10-CM

## 2025-06-19 DIAGNOSIS — J32.9 CHRONIC SINUSITIS, UNSPECIFIED: ICD-10-CM

## 2025-06-19 PROCEDURE — 87070 CULTURE OTHR SPECIMN AEROBIC: CPT | Mod: ORL | Performed by: OTOLARYNGOLOGY

## 2025-06-19 PROCEDURE — 87102 FUNGUS ISOLATION CULTURE: CPT | Mod: ORL | Performed by: OTOLARYNGOLOGY

## 2025-06-21 LAB
BACTERIA SPEC CULT: ABNORMAL
BACTERIA SPEC CULT: ABNORMAL

## 2025-06-26 LAB — BACTERIA SPEC CULT: NORMAL

## 2025-07-03 LAB — BACTERIA SPEC CULT: NORMAL

## 2025-07-08 ENCOUNTER — OFFICE VISIT (OUTPATIENT)
Dept: URGENT CARE | Facility: URGENT CARE | Age: 80
End: 2025-07-08
Payer: COMMERCIAL

## 2025-07-08 ENCOUNTER — APPOINTMENT (OUTPATIENT)
Dept: RADIOLOGY | Facility: HOSPITAL | Age: 80
DRG: 310 | End: 2025-07-08
Attending: EMERGENCY MEDICINE
Payer: COMMERCIAL

## 2025-07-08 ENCOUNTER — APPOINTMENT (OUTPATIENT)
Dept: CT IMAGING | Facility: HOSPITAL | Age: 80
DRG: 310 | End: 2025-07-08
Payer: COMMERCIAL

## 2025-07-08 ENCOUNTER — HOSPITAL ENCOUNTER (INPATIENT)
Facility: HOSPITAL | Age: 80
LOS: 1 days | Discharge: HOME | DRG: 310 | End: 2025-07-10
Attending: EMERGENCY MEDICINE | Admitting: INTERNAL MEDICINE
Payer: COMMERCIAL

## 2025-07-08 VITALS
WEIGHT: 190 LBS | BODY MASS INDEX: 26.88 KG/M2 | RESPIRATION RATE: 16 BRPM | TEMPERATURE: 98 F | DIASTOLIC BLOOD PRESSURE: 77 MMHG | HEART RATE: 83 BPM | SYSTOLIC BLOOD PRESSURE: 116 MMHG | OXYGEN SATURATION: 100 %

## 2025-07-08 DIAGNOSIS — J32.0 CHRONIC MAXILLARY SINUSITIS: ICD-10-CM

## 2025-07-08 DIAGNOSIS — R53.1 WEAKNESS GENERALIZED: ICD-10-CM

## 2025-07-08 DIAGNOSIS — I48.91 NEW ONSET ATRIAL FIBRILLATION (H): ICD-10-CM

## 2025-07-08 DIAGNOSIS — I21.4 NON-ST ELEVATION (NSTEMI) MYOCARDIAL INFARCTION (H): ICD-10-CM

## 2025-07-08 DIAGNOSIS — I10 BENIGN ESSENTIAL HYPERTENSION: Primary | ICD-10-CM

## 2025-07-08 DIAGNOSIS — Z87.898 HISTORY OF CHRONIC COUGH: ICD-10-CM

## 2025-07-08 DIAGNOSIS — E78.2 MIXED HYPERLIPIDEMIA: ICD-10-CM

## 2025-07-08 DIAGNOSIS — R42 DISEQUILIBRIUM: ICD-10-CM

## 2025-07-08 DIAGNOSIS — I48.91 ATRIAL FIBRILLATION WITH RAPID VENTRICULAR RESPONSE (H): Primary | ICD-10-CM

## 2025-07-08 DIAGNOSIS — Z86.79 HISTORY OF HIGH BLOOD PRESSURE: ICD-10-CM

## 2025-07-08 DIAGNOSIS — J32.9 RECURRENT SINUS INFECTIONS: ICD-10-CM

## 2025-07-08 DIAGNOSIS — I48.91 ATRIAL FIBRILLATION WITH RVR (H): ICD-10-CM

## 2025-07-08 DIAGNOSIS — R42 DIZZINESS: ICD-10-CM

## 2025-07-08 LAB
ALBUMIN UR-MCNC: NEGATIVE MG/DL
ANION GAP SERPL CALCULATED.3IONS-SCNC: 9 MMOL/L (ref 7–15)
APPEARANCE UR: CLEAR
ATRIAL RATE - MUSE: 138 BPM
BASOPHILS # BLD AUTO: 0.1 10E3/UL (ref 0–0.2)
BASOPHILS NFR BLD AUTO: 1 %
BILIRUB UR QL STRIP: NEGATIVE
BUN SERPL-MCNC: 18 MG/DL (ref 8–23)
CALCIUM SERPL-MCNC: 9.4 MG/DL (ref 8.8–10.4)
CHLORIDE SERPL-SCNC: 103 MMOL/L (ref 98–107)
CK SERPL-CCNC: 49 U/L (ref 39–308)
COLOR UR AUTO: ABNORMAL
CREAT SERPL-MCNC: 0.98 MG/DL (ref 0.67–1.17)
DIASTOLIC BLOOD PRESSURE - MUSE: NORMAL MMHG
EGFRCR SERPLBLD CKD-EPI 2021: 78 ML/MIN/1.73M2
EOSINOPHIL # BLD AUTO: 0 10E3/UL (ref 0–0.7)
EOSINOPHIL NFR BLD AUTO: 0 %
ERYTHROCYTE [DISTWIDTH] IN BLOOD BY AUTOMATED COUNT: 14.2 % (ref 10–15)
FLUAV RNA SPEC QL NAA+PROBE: NEGATIVE
FLUBV RNA RESP QL NAA+PROBE: NEGATIVE
GLUCOSE SERPL-MCNC: 107 MG/DL (ref 70–99)
GLUCOSE UR STRIP-MCNC: NEGATIVE MG/DL
HCO3 SERPL-SCNC: 24 MMOL/L (ref 22–29)
HCT VFR BLD AUTO: 43 % (ref 40–53)
HGB BLD-MCNC: 14.1 G/DL (ref 13.3–17.7)
HGB UR QL STRIP: NEGATIVE
HOLD SPECIMEN: NORMAL
IMM GRANULOCYTES # BLD: 0.1 10E3/UL
IMM GRANULOCYTES NFR BLD: 1 %
INTERPRETATION ECG - MUSE: NORMAL
KETONES UR STRIP-MCNC: NEGATIVE MG/DL
LEUKOCYTE ESTERASE UR QL STRIP: NEGATIVE
LYMPHOCYTES # BLD AUTO: 2.5 10E3/UL (ref 0.8–5.3)
LYMPHOCYTES NFR BLD AUTO: 26 %
MAGNESIUM SERPL-MCNC: 1.9 MG/DL (ref 1.7–2.3)
MCH RBC QN AUTO: 30 PG (ref 26.5–33)
MCHC RBC AUTO-ENTMCNC: 32.8 G/DL (ref 31.5–36.5)
MCV RBC AUTO: 92 FL (ref 78–100)
MONOCYTES # BLD AUTO: 1 10E3/UL (ref 0–1.3)
MONOCYTES NFR BLD AUTO: 10 %
NEUTROPHILS # BLD AUTO: 6 10E3/UL (ref 1.6–8.3)
NEUTROPHILS NFR BLD AUTO: 62 %
NITRATE UR QL: NEGATIVE
NRBC # BLD AUTO: 0 10E3/UL
NRBC BLD AUTO-RTO: 0 /100
NT-PROBNP SERPL-MCNC: 2330 PG/ML (ref 0–852)
P AXIS - MUSE: NORMAL DEGREES
PH UR STRIP: 7.5 [PH] (ref 5–7)
PLATELET # BLD AUTO: 437 10E3/UL (ref 150–450)
POTASSIUM SERPL-SCNC: 5 MMOL/L (ref 3.4–5.3)
PR INTERVAL - MUSE: NORMAL MS
PROCALCITONIN SERPL IA-MCNC: 0.13 NG/ML
QRS DURATION - MUSE: 132 MS
QT - MUSE: 358 MS
QTC - MUSE: 488 MS
R AXIS - MUSE: -34 DEGREES
RBC # BLD AUTO: 4.7 10E6/UL (ref 4.4–5.9)
RBC URINE: 0 /HPF
RSV RNA SPEC NAA+PROBE: NEGATIVE
SARS-COV-2 RNA RESP QL NAA+PROBE: NEGATIVE
SODIUM SERPL-SCNC: 136 MMOL/L (ref 135–145)
SP GR UR STRIP: 1.01 (ref 1–1.03)
SYSTOLIC BLOOD PRESSURE - MUSE: NORMAL MMHG
T AXIS - MUSE: 120 DEGREES
TROPONIN T SERPL HS-MCNC: 22 NG/L
TROPONIN T SERPL HS-MCNC: 31 NG/L
TSH SERPL DL<=0.005 MIU/L-ACNC: 1.92 UIU/ML (ref 0.3–4.2)
UROBILINOGEN UR STRIP-MCNC: NORMAL MG/DL
VENTRICULAR RATE- MUSE: 112 BPM
WBC # BLD AUTO: 9.6 10E3/UL (ref 4–11)
WBC URINE: <1 /HPF

## 2025-07-08 PROCEDURE — 85025 COMPLETE CBC W/AUTO DIFF WBC: CPT | Performed by: EMERGENCY MEDICINE

## 2025-07-08 PROCEDURE — 3074F SYST BP LT 130 MM HG: CPT | Performed by: PHYSICIAN ASSISTANT

## 2025-07-08 PROCEDURE — 71045 X-RAY EXAM CHEST 1 VIEW: CPT

## 2025-07-08 PROCEDURE — 96375 TX/PRO/DX INJ NEW DRUG ADDON: CPT

## 2025-07-08 PROCEDURE — G0378 HOSPITAL OBSERVATION PER HR: HCPCS

## 2025-07-08 PROCEDURE — 3078F DIAST BP <80 MM HG: CPT | Performed by: PHYSICIAN ASSISTANT

## 2025-07-08 PROCEDURE — 84484 ASSAY OF TROPONIN QUANT: CPT | Performed by: EMERGENCY MEDICINE

## 2025-07-08 PROCEDURE — 71250 CT THORAX DX C-: CPT

## 2025-07-08 PROCEDURE — 83880 ASSAY OF NATRIURETIC PEPTIDE: CPT | Performed by: EMERGENCY MEDICINE

## 2025-07-08 PROCEDURE — 84145 PROCALCITONIN (PCT): CPT

## 2025-07-08 PROCEDURE — 93005 ELECTROCARDIOGRAM TRACING: CPT | Performed by: PHYSICIAN ASSISTANT

## 2025-07-08 PROCEDURE — 93005 ELECTROCARDIOGRAM TRACING: CPT | Performed by: EMERGENCY MEDICINE

## 2025-07-08 PROCEDURE — 87637 SARSCOV2&INF A&B&RSV AMP PRB: CPT

## 2025-07-08 PROCEDURE — 80048 BASIC METABOLIC PNL TOTAL CA: CPT | Performed by: EMERGENCY MEDICINE

## 2025-07-08 PROCEDURE — 250N000013 HC RX MED GY IP 250 OP 250 PS 637: Performed by: INTERNAL MEDICINE

## 2025-07-08 PROCEDURE — 250N000011 HC RX IP 250 OP 636: Performed by: INTERNAL MEDICINE

## 2025-07-08 PROCEDURE — 99223 1ST HOSP IP/OBS HIGH 75: CPT | Mod: FS | Performed by: INTERNAL MEDICINE

## 2025-07-08 PROCEDURE — 1126F AMNT PAIN NOTED NONE PRSNT: CPT | Performed by: PHYSICIAN ASSISTANT

## 2025-07-08 PROCEDURE — 82550 ASSAY OF CK (CPK): CPT | Performed by: INTERNAL MEDICINE

## 2025-07-08 PROCEDURE — 36415 COLL VENOUS BLD VENIPUNCTURE: CPT | Performed by: EMERGENCY MEDICINE

## 2025-07-08 PROCEDURE — 250N000011 HC RX IP 250 OP 636: Performed by: EMERGENCY MEDICINE

## 2025-07-08 PROCEDURE — 99285 EMERGENCY DEPT VISIT HI MDM: CPT | Mod: 25 | Performed by: EMERGENCY MEDICINE

## 2025-07-08 PROCEDURE — 99215 OFFICE O/P EST HI 40 MIN: CPT | Performed by: PHYSICIAN ASSISTANT

## 2025-07-08 PROCEDURE — 96374 THER/PROPH/DIAG INJ IV PUSH: CPT

## 2025-07-08 PROCEDURE — 83735 ASSAY OF MAGNESIUM: CPT | Performed by: EMERGENCY MEDICINE

## 2025-07-08 PROCEDURE — 70450 CT HEAD/BRAIN W/O DYE: CPT

## 2025-07-08 PROCEDURE — 81001 URINALYSIS AUTO W/SCOPE: CPT

## 2025-07-08 PROCEDURE — 99207 PR APP CREDIT; MD BILLING SHARED VISIT: CPT | Mod: FS

## 2025-07-08 PROCEDURE — 84443 ASSAY THYROID STIM HORMONE: CPT | Performed by: EMERGENCY MEDICINE

## 2025-07-08 RX ORDER — AMOXICILLIN 250 MG
1 CAPSULE ORAL 2 TIMES DAILY PRN
Status: DISCONTINUED | OUTPATIENT
Start: 2025-07-08 | End: 2025-07-10 | Stop reason: HOSPADM

## 2025-07-08 RX ORDER — ACETAMINOPHEN 650 MG/1
650 SUPPOSITORY RECTAL EVERY 4 HOURS PRN
Status: DISCONTINUED | OUTPATIENT
Start: 2025-07-08 | End: 2025-07-10 | Stop reason: HOSPADM

## 2025-07-08 RX ORDER — ONDANSETRON 4 MG/1
4 TABLET, ORALLY DISINTEGRATING ORAL EVERY 6 HOURS PRN
Status: DISCONTINUED | OUTPATIENT
Start: 2025-07-08 | End: 2025-07-10 | Stop reason: HOSPADM

## 2025-07-08 RX ORDER — METOPROLOL TARTRATE 1 MG/ML
5 INJECTION, SOLUTION INTRAVENOUS EVERY 6 HOURS PRN
Status: DISCONTINUED | OUTPATIENT
Start: 2025-07-08 | End: 2025-07-10 | Stop reason: HOSPADM

## 2025-07-08 RX ORDER — DILTIAZEM HYDROCHLORIDE 5 MG/ML
10 INJECTION INTRAVENOUS ONCE
Status: COMPLETED | OUTPATIENT
Start: 2025-07-08 | End: 2025-07-08

## 2025-07-08 RX ORDER — METOPROLOL TARTRATE 25 MG/1
25 TABLET, FILM COATED ORAL 2 TIMES DAILY
Status: DISCONTINUED | OUTPATIENT
Start: 2025-07-08 | End: 2025-07-09

## 2025-07-08 RX ORDER — HEPARIN SODIUM 10000 [USP'U]/100ML
0-5000 INJECTION, SOLUTION INTRAVENOUS CONTINUOUS
Status: DISCONTINUED | OUTPATIENT
Start: 2025-07-08 | End: 2025-07-09

## 2025-07-08 RX ORDER — ACETAMINOPHEN 325 MG/1
650 TABLET ORAL EVERY 4 HOURS PRN
Status: DISCONTINUED | OUTPATIENT
Start: 2025-07-08 | End: 2025-07-10 | Stop reason: HOSPADM

## 2025-07-08 RX ORDER — POLYETHYLENE GLYCOL 3350 17 G/17G
17 POWDER, FOR SOLUTION ORAL 2 TIMES DAILY PRN
Status: DISCONTINUED | OUTPATIENT
Start: 2025-07-08 | End: 2025-07-10 | Stop reason: HOSPADM

## 2025-07-08 RX ORDER — AMOXICILLIN 250 MG
2 CAPSULE ORAL 2 TIMES DAILY PRN
Status: DISCONTINUED | OUTPATIENT
Start: 2025-07-08 | End: 2025-07-10 | Stop reason: HOSPADM

## 2025-07-08 RX ORDER — ONDANSETRON 2 MG/ML
4 INJECTION INTRAMUSCULAR; INTRAVENOUS EVERY 6 HOURS PRN
Status: DISCONTINUED | OUTPATIENT
Start: 2025-07-08 | End: 2025-07-10 | Stop reason: HOSPADM

## 2025-07-08 RX ORDER — LISINOPRIL 20 MG/1
20 TABLET ORAL DAILY
COMMUNITY

## 2025-07-08 RX ORDER — PROCHLORPERAZINE MALEATE 5 MG/1
5 TABLET ORAL EVERY 6 HOURS PRN
Status: DISCONTINUED | OUTPATIENT
Start: 2025-07-08 | End: 2025-07-10 | Stop reason: HOSPADM

## 2025-07-08 RX ORDER — LISINOPRIL 20 MG/1
20 TABLET ORAL DAILY
Status: DISCONTINUED | OUTPATIENT
Start: 2025-07-09 | End: 2025-07-10 | Stop reason: HOSPADM

## 2025-07-08 RX ADMIN — METOPROLOL TARTRATE 25 MG: 25 TABLET, FILM COATED ORAL at 22:43

## 2025-07-08 RX ADMIN — DILTIAZEM HYDROCHLORIDE 10 MG: 5 INJECTION INTRAVENOUS at 15:05

## 2025-07-08 RX ADMIN — HEPARIN SODIUM 1050 UNITS/HR: 10000 INJECTION, SOLUTION INTRAVENOUS at 22:47

## 2025-07-08 ASSESSMENT — COLUMBIA-SUICIDE SEVERITY RATING SCALE - C-SSRS
6. HAVE YOU EVER DONE ANYTHING, STARTED TO DO ANYTHING, OR PREPARED TO DO ANYTHING TO END YOUR LIFE?: NO
2. HAVE YOU ACTUALLY HAD ANY THOUGHTS OF KILLING YOURSELF IN THE PAST MONTH?: NO
1. IN THE PAST MONTH, HAVE YOU WISHED YOU WERE DEAD OR WISHED YOU COULD GO TO SLEEP AND NOT WAKE UP?: NO

## 2025-07-08 ASSESSMENT — PAIN SCALES - GENERAL: PAINLEVEL_OUTOF10: NO PAIN (0)

## 2025-07-08 ASSESSMENT — ACTIVITIES OF DAILY LIVING (ADL)
ADLS_ACUITY_SCORE: 35
ADLS_ACUITY_SCORE: 41
ADLS_ACUITY_SCORE: 35
DEPENDENT_IADLS:: INDEPENDENT
ADLS_ACUITY_SCORE: 41
ADLS_ACUITY_SCORE: 35

## 2025-07-08 ASSESSMENT — ENCOUNTER SYMPTOMS
LIGHT-HEADEDNESS: 1
FATIGUE: 1
APPETITE CHANGE: 1
NAUSEA: 1
HEADACHES: 0
UNEXPECTED WEIGHT CHANGE: 1
DIZZINESS: 0
NUMBNESS: 0
CHILLS: 1
WEAKNESS: 0
VOMITING: 0
COUGH: 0
SHORTNESS OF BREATH: 0

## 2025-07-08 NOTE — PROGRESS NOTES
"[facility] [place]  [address] [address]  Phone: [phone]  Fax: [phone]    Patient:  Matthew Platt, Date of birth 1945  Date of Visit:  07/08/2025  Referring Provider No ref. provider found    Patient presents with:  Nausea  Hypertension        ICD-10-CM    1. Atrial fibrillation with rapid ventricular response (H)  I48.91       2. Disequilibrium  R42 EKG 12-lead, tracing only          There are no Patient Instructions on file for this visit.    Assessment & Plan      Assessment  - Possible premature ventricular contractions vs a fib.  EKG shows shows A-fib with rapid ventricular rate and left bundle branch block.  Report given to ED provider prior to the patient's arrival.  Patient's wife transported the patient to the hospital.  - Crackling and popping sounds in the left lung, warranting further investigation.  - Dizziness potentially requiring a comprehensive diagnostic workup.             History of Present Illness     Pertinent history obtain from: patient    Carmelo Platt, 79-year-old male. Presented to urgent care reporting feeling \"off\" and very weak for approximately 10 days prior to presentation. Symptoms began with general malaise and progressed to persistent nausea and lightheadedness, without vomiting or diarrhea. Noted significant decrease in appetite and minimal bowel movements since 06/27, with small stools but no sensation of constipation or urge. Denied fever except for a very low-grade temperature around 07/02, he felt mildly chilled at the time. Denied headache. Denied recent shortness of breath, chest congestion, or wheezing. Voice has felt and sounded weak. Denied difficulty breathing. Denied recent tick bites, but spends significant time in weeds. Noted new tremor in hand, never experienced before. Denied change in gait or balance issues. History of chronic sinusitis for several years, previously treated with septoplasty " and multiple antibiotics including amoxicillin. Underwent ENT evaluation during this illness; swab identified Klebsiella, and Bactrim was prescribed and taken for 7 days, then discontinued per ENT due to symptoms and rash. COVID test performed during this period was negative. Takes lisinopril for blood pressure; noted blood pressure fluctuations on home monitor today, which increased concern. History of prior NSTEMI, previously evaluated with stress test and found to be normal. Left eyelid droop not new in the last two weeks.    Problem List:  2023: Benign essential hypertension  2023: Mixed hyperlipidemia  2023: Recurrent sinus infections  2023: History of high blood pressure  2022: Syncope  2022: Non-ST elevation (NSTEMI) myocardial infarction (H)  2013: Chronic maxillary sinusitis  2013: History of chronic cough  2011: Lumbago  2006: [facility] DIS NEC  2000: Lumbar spinal stenosis      Past Medical History:   Diagnosis Date    Chronic sinusitis     Hoarseness 2018    Obstructive sleep apnea     Sleep apnea 2017    Tinnitus        Social History     Tobacco Use    Smoking status: Former     Current packs/day: 0.00     Average packs/day: 1 pack/day for 7.3 years (7.3 ttl pk-yrs)     Types: Cigarettes, Cigars, Pipe     Start date: 1966     Quit date: 1974     Years since quittin.5     Passive exposure: Past    Smokeless tobacco: Never   Substance Use Topics    Alcohol use: Yes     Alcohol/week: 7.0 standard drinks of alcohol     Types: 7 Glasses of wine per week     Review of Systems   Constitutional:  Positive for appetite change, chills, fatigue and unexpected weight change (loss).   Respiratory:  Negative for cough and shortness of breath.    Gastrointestinal:  Positive for nausea. Negative for vomiting.   Neurological:  Positive for light-headedness. Negative for dizziness, weakness, numbness and headaches.       Physical Exam     Physical  Exam  Vitals and nursing note reviewed.   Constitutional:       General: He is not in acute distress.     Appearance: He is not toxic-appearing or diaphoretic.   HENT:      Head: Normocephalic and atraumatic.   Eyes:      Conjunctiva/sclera: Conjunctivae normal.      Comments: Mild left upper lid ptosis.  This is chronic per the patient   Cardiovascular:      Rate and Rhythm: Tachycardia present. Rhythm irregular.      Comments: Occasional skipped beats that sound consistent with PVCs.  Pulmonary:      Effort: Pulmonary effort is normal.      Breath sounds: Wheezing and rales (Left lower quadrant) present.   Neurological:      Mental Status: He is alert.   Psychiatric:         Mood and Affect: Mood normal.         Behavior: Behavior normal.         Thought Content: Thought content normal.         Judgment: Judgment normal.         Vital signs:  /77 (BP Location: Right arm, Patient Position: Sitting)   Pulse 83   Temp 98  F (36.7  C) (Oral)   Resp 16   Wt 86.2 kg (190 lb)   SpO2 100%   BMI 26.88 kg/m        Data   Laboratory data and imaging listed below were reviewed as part of this encounter.     Results for orders placed or performed in visit on 07/08/25   EKG 12-lead, tracing only     Status: None (Preliminary result)   Result Value Ref Range    Systolic Blood Pressure  mmHg    Diastolic Blood Pressure  mmHg    Ventricular Rate 112 BPM    Atrial Rate 138 BPM    VA Interval  ms    QRS Duration 132 ms     ms    QTc 488 ms    P Axis  degrees    R AXIS -34 degrees    T Axis 120 degrees    Interpretation ECG       Atrial fibrillation with rapid ventricular response with premature ventricular or aberrantly conducted complexes  Left axis deviation  Left bundle branch block  Abnormal ECG  When compared with ECG of 01-Feb-2019 09:26,  Atrial fibrillation has replaced Sinus rhythm  Vent. rate has increased by  40 bpm  Left bundle branch block is now Present                  Consent was obtained from the  patient to use an AI documentation tool in the creation of  this note

## 2025-07-08 NOTE — PROGRESS NOTES
Urgent Care Clinic Visit    Chief Complaint   Patient presents with    Nausea    Hypertension               7/8/2025    12:51 PM   Additional Questions   Roomed by shimon

## 2025-07-08 NOTE — MEDICATION SCRIBE - ADMISSION MEDICATION HISTORY
Medication Scribe Admission Medication History    Admission medication history is complete. The information provided in this note is only as accurate as the sources available at the time of the update.    Information Source(s): Patient via in-person    Pertinent Information: patient able to verify med list, allergies and last dosing. Patient states he has only been taking 20mg of lisinopril rather than 30mg    Changes made to PTA medication list:  Added: Preservision  Deleted: None  Changed: lisinopril    Allergies reviewed with patient and updates made in EHR: yes    Medication History Completed By: Abdifatah Keys 7/8/2025 5:51 PM    PTA Med List   Medication Sig Last Dose/Taking    lisinopril (ZESTRIL) 20 MG tablet Take 20 mg by mouth daily. 7/7/2025 Evening    Multiple Vitamins-Minerals (PRESERVISION AREDS 2 PO) Take 1 tablet by mouth daily. 7/7/2025 Evening

## 2025-07-08 NOTE — ED TRIAGE NOTES
Patient presents here for evaluation of atrial fibrillation. He was seen at his clinic for generalized malaise and fatigue that has occurred over the past 10 days. ECG was done in clinic and revealed atrial fibrillation. Patient does not have a previous hx. Heart rhythm is irregular with auscultation.      Triage Assessment (Adult)       Row Name 07/08/25 1402          Triage Assessment    Airway WDL WDL        Respiratory WDL    Respiratory WDL WDL        Skin Circulation/Temperature WDL    Skin Circulation/Temperature WDL WDL        Cardiac WDL    Cardiac WDL WDL        Peripheral/Neurovascular WDL    Peripheral Neurovascular WDL WDL        Cognitive/Neuro/Behavioral WDL    Cognitive/Neuro/Behavioral WDL WDL

## 2025-07-08 NOTE — H&P
[facility]    History and Physical - Hospitalist Service       Date of Admission:  7/8/2025    Assessment & Plan    Matthew Platt is a 79 year old male with PMHx of HTN, chronic sinusitis, KENNEDY who was admitted on 7/8/2025. He presented to the ER after feeling generally unwell, found to have A-fib with RVR which would be a new diagnosis for patient.  Ventricular rate improved s/p diltiazem and metoprolol, cardiology consulted    Atrial Fibrillation with RVR   This is a NEW diagnosis for patient. HR 110s in the ED ; s/p 10 mg diltiazem in the ED   -- EKG A-fib with RVR with LBBB ; on chart review, has some previous documentation of LBBB from 2022  -- Trop 31 --> 22   -- PRN 5 mg IV metoprolol for HR > 100 q 6 hours   -- Echo ordered   -- Cardiology consult   -- NPO MN   -- CHADS score of at least 3 - pharm liaison for [employer] coverage - will defer to cards in the AM when to initiate   -- Telemonitoring   -- TSH normal     Elevated BNP   BNP 2330 - does not appear significantly volume overloaded on exam - hold off on diuresis at this time  -- No signs of fluid congestion on CXR, CT chest ordered to further evaluate  -- Echo ordered as above  -- Cardiology consult   -- Daily weights   -- Cardiac monitoring     Generalized Malaise / Lightheadedness   Suspect secondary to the above +/- intolerance to recent Bactrim use  -- CXR showed streaky opacity involving the medial aspect of the left lung base representing either atelectasis/scarring versus infectious process   -- CT chest ordered to further evaluate  -- CT head ordered   -- Negative Covid, flu, RSV  -- Procal normal   -- UA no infectious indicators  -- Check orthostatics    Essential Hypertension   -- continue PTA lisinopril with hold parameters   -- close monitoring     Chronic Sinusitis   Was just seen by ENT and had swab done for Klebsiella - he completed 7 day course of Bactrim before discontinuing (felt nauseated and developed a  rash)  -- Continue outpatient follow-up with ENT     KENNEDY noncompliant with CPAP, states would not utilize here        Observation Goals: -diagnostic tests and consults completed and resulted, -vital signs normal or at patient baseline, -returns to baseline functional status, -safe disposition plan has been identified, Nurse to notify provider when observation goals have been met and patient is ready for discharge.  Diet: Combination Diet Low Saturated Fat Na <2400mg Diet, No Caffeine Diet  NPO for Procedure/Surgery per Anesthesia Guidelines Except for: Meds, Ice Chips; Clear liquids before procedure/surgery: ADULT (Age GREATER than or Equal to 18 years) - Clear liquids 2 hours before procedure/surgery  DVT Prophylaxis: Pneumatic Compression Devices  Ware Catheter: Not present  Lines: None     Cardiac Monitoring: ACTIVE order. Indication: Tachyarrhythmias, acute (48 hours)  Code Status: No CPR- Do NOT Intubate - discussed and confirmed with patient, adamant he would not want resuscitation efforts     Clinically Significant Risk Factors Present on Admission                   # Hypertension: Noted on problem list           # Overweight: Estimated body mass index is 26.16 kg/m  as calculated from the following:    Height as of this encounter: 1.829 m (6').    Weight as of this encounter: 87.5 kg (192 lb 14.4 oz).       # Financial/Environmental Concerns: none         Disposition Plan     Medically Ready for Discharge: Anticipated Tomorrow        The patient's care was discussed with the Attending Physician, Dr. Phillip Gold.    Maria Del Rosario Rose PA-C  Hospitalist Service  [facility]  Securely message with [employer] (more info)  Text page via [employer] Paging/Directory     ______________________________________________________________________    Chief Complaint   Generalized Malaise / Lightheadedness     History is obtained from the patient    History of Present Illness   Matthew Platt is a 79 year  "old male with PMHx of HTN, chronic sinusitis, KENNEDY who presented to the ER on 7/8/2025 for evaluation of new diagnosis of A-fib patient states he has a history of chronic sinusitis, saw his ENT about 2 weeks ago and was prescribed Bactrim for Klebsiella swab.  States he started feeling \"not great\" around 10 days ago and was having nausea, decreased appetite, chills (completed 7-day course of Bactrim).  Today, felt lightheaded and \"woozy\" this morning, monitor his blood pressures and noticed that they were \"all over the place\" (SBP 100s --> 180s) so went to the clinic for further evaluation.  He denies any chest pain, palpitations.  No vomiting, diarrhea, abdominal pain.  No cough or shortness of breath.  No vision changes or unilateral weakness.  Does endorse some increased urinary frequency but no dysuria or hematuria.  No known sick exposures. History of KENNEDY but states he does not use his CPAP machine and would not use when here.  States he had an episode of syncope in 2022 and was seen by EP at that time.    Past Medical History    Past Medical History:   Diagnosis Date    Chronic sinusitis     Hoarseness 09/01/2018    Obstructive sleep apnea     Sleep apnea 09/01/2017    Tinnitus        Past Surgical History   Past Surgical History:   Procedure Laterality Date    CATARACT EXTRACTION, BILATERAL Bilateral     ENT SURGERY      OTHER SURGICAL HISTORY Bilateral     facial plastic surgery    PE TUBES      RELEASE DUPUYTRENS CONTRACTURE Left     4th finger    TONSILLECTOMY      TONSILLECTOMY      VASECTOMY         Prior to Admission Medications   Prior to Admission Medications   Prescriptions Last Dose Informant Patient Reported? Taking?   Multiple Vitamins-Minerals (PRESERVISION AREDS 2 PO) 7/7/2025 Evening  Yes Yes   Sig: Take 1 tablet by mouth daily.   lisinopril (ZESTRIL) 20 MG tablet 7/7/2025 Evening  Yes Yes   Sig: Take 20 mg by mouth daily.      Facility-Administered Medications: None           Physical Exam "   Vital Signs: Temp: 98.3  F (36.8  C) Temp src: Oral BP: 120/68 Pulse: 89   Resp: 15 SpO2: 97 %      Weight: 192 lbs 14.44 oz    Constitutional: awake, alert, no apparent distress, and appears stated age  Eyes: Lids and lashes normal, extra ocular muscles intact, sclera clear, conjunctiva normal. Chronic, baseline left ptosis   Respiratory: No increased work of breathing, no accessory muscle use.  Faint crackles to left lung base  Cardiovascular: Regular rate and rhythm, normal S1/S2  GI: Soft, non-distended, non-tender, normal bowel sounds, no masses palpated, no hepatosplenomegaly  Skin: Normal skin color, texture, turgor. No rashes and no jaundice  Musculoskeletal: no lower extremity pitting edema present.   Neurologic: Awake, alert.   Neuropsychiatric: Appropriate mood, affect and eye contact. Cooperative.    Medical Decision Making             Data     I have personally reviewed the following data over the past 24 hrs:    9.6  \   14.1   / 437     136 103 18.0 /  107 (H)   5.0 24 0.98 \     Trop: 22 BNP: 2,330 (H)     TSH: 1.92 T4: N/A A1C: N/A     Procal: 0.13 CRP: N/A Lactic Acid: N/A         Imaging results reviewed over the past 24 hrs:   Recent Results (from the past 24 hours)   XR Chest Port 1 View    Narrative    EXAM: XR CHEST PORT 1 VIEW  LOCATION: [facility]  DATE: 7/8/2025    INDICATION: new a fib  COMPARISON: 9/11/2022      Impression    IMPRESSION: Heart and mediastinal size are normal. There is a small amount of streaky opacity involving the medial aspect of the left lung base representing either atelectasis/scarring versus infectious process. Right lung is clear. No pleural effusion   or pneumothorax.

## 2025-07-08 NOTE — ED PROVIDER NOTES
EMERGENCY DEPARTMENT ENCOUNTER      NAME: Matthew Platt  AGE: 79 year old male  YOB: 1945  MRN: [id number]  EVALUATION DATE & TIME: 7/8/2025  2:47 PM    PCP: Melia Torre    ED PROVIDER: Vashti Thomas M.D.      Chief Complaint   Patient presents with    Atrial Fibrillation         FINAL IMPRESSION:  1. Atrial fibrillation with RVR (H)    2. New onset atrial fibrillation (H)    3. Dizziness    4. Weakness generalized        ED COURSE & MEDICAL DECISION MAKING:    Pertinent Labs & Imaging studies reviewed. (See chart for details)  ED Course as of 07/08/25 1840   Tue Jul 08, 2025   1522 Patient is a 79-year-old male who comes in today for evaluation of feeling nausea and weakness and lightheadedness for about a week now.  He presented to his primary care clinic thinking that he had a stomach virus.  He had recent sinusitis that was treated with Bactrim and about a week into the Bactrim he developed hives so he stopped taking the Bactrim.  He was also holding his lisinopril during the Bactrim in case there was an interaction.  He felt little bit better yesterday and then this morning started to feel little bit dizzy again that his blood pressure was all over the place.  He denies any chest pain or shortness of breath and has no history of atrial fibrillation.  He says he occasionally gets a twitch in his arm.  He has a non-STEMI but he said what he had was a syncopal episode and workup revealed no cause.  He followed up with a stress test that came back normal.  He does have a history of hypertension and takes lisinopril for that.  On my exam he has an irregularly irregular rhythm.  He was initially tachycardic into the 1 teens and was given some IV diltiazem which seems to have lowered his heart rate to closer to about 100.  He is not ill-appearing.  Will see what lab work and chest x-ray look like and then can determine further evaluation after that.  I discussed all this with the patient  and he is in agreement with the plan.   1657 I checked back in with the patient and family.  Heart rate is still hovering in the 100s.  His BNP is elevated.  He is quite symptomatic and since I cannot guarantee that he can get into rapid access clinic quickly I think he should come into the hospital for further workup.  He is comfortable with that plan.   1728 I spoke with Maria Del Rosario with the hospitalist service.  She was in agreement with the cardiac telemetry observation admission for new onset A-fib with symptoms.       Medical Decision Making    History:  Supplemental history from or  use: Patient's wife who provides some history  External Record(s) reviewed: I reviewed clinic note from earlier today.  Patient had presented to urgent care feeling off and weak for about 10 days.  He had reported some nausea and lightheadedness without vomiting or diarrhea.  He had noted decrease in appetite.  He denied chest pain or shortness of breath for them.  They did note an irregular rhythm and EKG showed atrial fibrillation with RVR.    Work Up:  Emergent/Severe conditions considered and evaluated for: Electrolyte abnormality, hyperglycemia, hypoglycemia, acidosis, anemia, thrombocytopenia, renal failure, dehydration, arrhythmia, heart failure  I independently reviewed and interpreted EKG and chest x-ray which showed no pneumothorax. See full radiology report for all details. Rhythm strip shows atrial fibrillation with RVR at 105 bpm.  In addition to work up documented, I considered the following work up: None  Medications given that require intensive monitoring for toxicity: IV diltiazem    External consultation:  Discussion of management with another provider: Hospitalist    Complicating factors:  Care impacted by chronic illness: Hypertension, hyperlipidemia    Disposition considerations: Admission    MIPS (CTA, Dental pain, Ware, Sinusitis, Asthma/COPD, Head Trauma)    At the conclusion of the encounter I  discussed  the results of all of the tests and the disposition with patient.   All questions were answered.  The patient acknowledged understanding and was involved in the decision making regarding the overall care plan.      MEDICATIONS GIVEN IN THE EMERGENCY:  Medications   senna-docusate (SENOKOT-S/PERICOLACE) 8.6-50 MG per tablet 1 tablet (has no administration in time range)     Or   senna-docusate (SENOKOT-S/PERICOLACE) 8.6-50 MG per tablet 2 tablet (has no administration in time range)   ondansetron (ZOFRAN ODT) ODT tab 4 mg (has no administration in time range)     Or   ondansetron (ZOFRAN) injection 4 mg (has no administration in time range)   prochlorperazine (COMPAZINE) injection 5 mg (has no administration in time range)     Or   prochlorperazine (COMPAZINE) tablet 5 mg (has no administration in time range)   acetaminophen (TYLENOL) tablet 650 mg (has no administration in time range)     Or   acetaminophen (TYLENOL) Suppository 650 mg (has no administration in time range)   polyethylene glycol (MIRALAX) Packet 17 g (has no administration in time range)   diltiazem (CARDIZEM) injection 10 mg (10 mg Intravenous $Given 7/8/25 4799)     =================================================================    HPI  Triage Note: Patient presents here for evaluation of atrial fibrillation. He was seen at his clinic for generalized malaise and fatigue that has occurred over the past 10 days. ECG was done in clinic and revealed atrial fibrillation. Patient does not have a previous hx. Heart rhythm is irregular with auscultation.      Triage Assessment (Adult)       Row Name 07/08/25 8045          Triage Assessment    Airway WDL WDL        Respiratory WDL    Respiratory WDL WDL        Skin Circulation/Temperature WDL    Skin Circulation/Temperature WDL WDL        Cardiac WDL    Cardiac WDL WDL        Peripheral/Neurovascular WDL    Peripheral Neurovascular WDL WDL        Cognitive/Neuro/Behavioral WDL     Cognitive/Neuro/Behavioral WDL WDL                   Matthew Platt is a 79 year old male who presents for evaluation of dizziness, nausea, and weakness for the last 1 week.    PAST MEDICAL HISTORY:  Past Medical History:   Diagnosis Date    Chronic sinusitis     Hoarseness 09/01/2018    Obstructive sleep apnea     Sleep apnea 09/01/2017    Tinnitus        PAST SURGICAL HISTORY:  Past Surgical History:   Procedure Laterality Date    CATARACT EXTRACTION, BILATERAL Bilateral     ENT SURGERY      OTHER SURGICAL HISTORY Bilateral     facial plastic surgery    PE TUBES      RELEASE DUPUYTRENS CONTRACTURE Left     4th finger    TONSILLECTOMY      TONSILLECTOMY      VASECTOMY         CURRENT MEDICATIONS:      Current Facility-Administered Medications:     acetaminophen (TYLENOL) tablet 650 mg, 650 mg, Oral, Q4H PRN **OR** acetaminophen (TYLENOL) Suppository 650 mg, 650 mg, Rectal, Q4H PRN, Maria Del Rosario Rose PA-C    ondansetron (ZOFRAN ODT) ODT tab 4 mg, 4 mg, Oral, Q6H PRN **OR** ondansetron (ZOFRAN) injection 4 mg, 4 mg, Intravenous, Q6H PRN, Maria Del Rosario Rose PA-C    polyethylene glycol (MIRALAX) Packet 17 g, 17 g, Oral, BID PRN, Maria Del Rosario Rose PA-C    prochlorperazine (COMPAZINE) injection 5 mg, 5 mg, Intravenous, Q6H PRN **OR** prochlorperazine (COMPAZINE) tablet 5 mg, 5 mg, Oral, Q6H PRN, Maria Del Rosario Rose PA-C    senna-docusate (SENOKOT-S/PERICOLACE) 8.6-50 MG per tablet 1 tablet, 1 tablet, Oral, BID PRN **OR** senna-docusate (SENOKOT-S/PERICOLACE) 8.6-50 MG per tablet 2 tablet, 2 tablet, Oral, BID PRN, Maria Del Rosario Rose PA-C    Current Outpatient Medications:     lisinopril (ZESTRIL) 20 MG tablet, Take 20 mg by mouth daily., Disp: , Rfl:     Multiple Vitamins-Minerals (PRESERVISION AREDS 2 PO), Take 1 tablet by mouth daily., Disp: , Rfl:     ALLERGIES:  No Known Allergies    FAMILY HISTORY:  Family History   Problem Relation Age of Onset    Cancer Father     Pancreatic Cancer Father 87.00    Hepatitis Mother     No Known Problems  Sister     No Known Problems Daughter     No Known Problems Son        SOCIAL HISTORY:   Social History     Socioeconomic History    Marital status:    Tobacco Use    Smoking status: Former     Current packs/day: 0.00     Average packs/day: 1 pack/day for 7.3 years (7.3 ttl pk-yrs)     Types: Cigarettes, Cigars, Pipe     Start date: 1966     Quit date: 1974     Years since quittin.5     Passive exposure: Past    Smokeless tobacco: Never   Vaping Use    Vaping status: Never Used   Substance and Sexual Activity    Alcohol use: Yes     Alcohol/week: 7.0 standard drinks of alcohol     Types: 7 Glasses of wine per week    Drug use: No   Social History Narrative    [id number]  [employer]  [id number]    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  Daughter Emma  Son -  3 grandchildren     Social Drivers of Health     Financial Resource Strain: Low Risk  (2023)    Financial Resource Strain     Within the past 12 months, have you or your family members you live with been unable to get utilities (heat, electricity) when it was really needed?: No   Food Insecurity: Low Risk  (2023)    Food Insecurity     Within the past 12 months, did you worry that your food would run out before you got money to buy more?: No     Within the past 12 months, did the food you bought just not last and you didn t have money to get more?: No   Transportation Needs: Low Risk  (2023)    Transportation Needs     Within the past 12 months, has lack of transportation kept you from medical appointments, getting your medicines, non-medical meetings or appointments, work, or from getting things that you need?: No   Physical Activity: Sufficiently Active (11/10/2021)    Exercise Vital Sign     Days of Exercise per Week: 5 days     Minutes of Exercise per Session: 40 min   Stress: No Stress Concern Present (11/10/2021)    [place] [employer]  Occupational [employer] - Occupational Stress Questionnaire     Feeling of Stress : Only a little   Social Connections: Unknown (11/10/2021)    Social Connection and Isolation Panel [NHANES]     Frequency of Communication with Friends and Family: More than three times a week     Frequency of Social Gatherings with Friends and Family: Twice a week     Active Member of Clubs or Organizations: Yes     Marital Status:    Interpersonal Safety: Low Risk  (8/1/2024)    Interpersonal Safety     Do you feel physically and emotionally safe where you currently live?: Yes     Within the past 12 months, have you been hit, slapped, kicked or otherwise physically hurt by someone?: No     Within the past 12 months, have you been humiliated or emotionally abused in other ways by your partner or ex-partner?: No   Housing Stability: Low Risk  (11/27/2023)    Housing Stability     Do you have housing? : Yes     Are you worried about losing your housing?: No       PHYSICAL EXAM    VITAL SIGNS: BP (!) 143/67   Pulse 101   Temp 98.3  F (36.8  C) (Oral)   Resp 17   Ht 1.829 m (6')   Wt 86.2 kg (190 lb)   SpO2 99%   BMI 25.77 kg/m     GENERAL: Awake, alert, answering questions appropriately, no acute distress  SPEECH:  Easy to understand speech, Normal volume and tiffanie  PULMONARY: No respiratory distress, Lungs clear to auscultation bilaterally  CARDIOVASCULAR: Irregularly irregular rate and rhythm, Distal pulses present and normal.  ABDOMINAL: Soft, Nondistended, Nontender, No rebound or guarding, No palpable masses  EXTREMITIES: No lower extremity edema.  PSYCH: Normal mood and affect     LAB:  All pertinent labs reviewed and interpreted.  Results for orders placed or performed during the hospital encounter of 07/08/25   XR Chest Port 1 View    Impression    IMPRESSION: Heart and mediastinal size are normal. There is a small amount of streaky opacity involving the medial aspect of the left lung base representing either  atelectasis/scarring versus infectious process. Right lung is clear. No pleural effusion   or pneumothorax.   Basic metabolic panel   Result Value Ref Range    Sodium 136 135 - 145 mmol/L    Potassium 5.0 3.4 - 5.3 mmol/L    Chloride 103 98 - 107 mmol/L    Carbon Dioxide (CO2) 24 22 - 29 mmol/L    Anion Gap 9 7 - 15 mmol/L    Urea Nitrogen 18.0 8.0 - 23.0 mg/dL    Creatinine 0.98 0.67 - 1.17 mg/dL    GFR Estimate 78 >60 mL/min/1.73m2    Calcium 9.4 8.8 - 10.4 mg/dL    Glucose 107 (H) 70 - 99 mg/dL   Result Value Ref Range    Troponin T, High Sensitivity 31 (H) <=22 ng/L   CBC with platelets and differential   Result Value Ref Range    WBC Count 9.6 4.0 - 11.0 10e3/uL    RBC Count 4.70 4.40 - 5.90 10e6/uL    Hemoglobin 14.1 13.3 - 17.7 g/dL    Hematocrit 43.0 40.0 - 53.0 %    MCV 92 78 - 100 fL    MCH 30.0 26.5 - 33.0 pg    MCHC 32.8 31.5 - 36.5 g/dL    RDW 14.2 10.0 - 15.0 %    Platelet Count 437 150 - 450 10e3/uL    % Neutrophils 62 %    % Lymphocytes 26 %    % Monocytes 10 %    % Eosinophils 0 %    % Basophils 1 %    % Immature Granulocytes 1 %    NRBCs per 100 WBC 0 <1 /100    Absolute Neutrophils 6.0 1.6 - 8.3 10e3/uL    Absolute Lymphocytes 2.5 0.8 - 5.3 10e3/uL    Absolute Monocytes 1.0 0.0 - 1.3 10e3/uL    Absolute Eosinophils 0.0 0.0 - 0.7 10e3/uL    Absolute Basophils 0.1 0.0 - 0.2 10e3/uL    Absolute Immature Granulocytes 0.1 <=0.4 10e3/uL    Absolute NRBCs 0.0 10e3/uL   Extra Blue Top Tube   Result Value Ref Range    Hold Specimen JIC    TSH with free T4 reflex   Result Value Ref Range    TSH 1.92 0.30 - 4.20 uIU/mL   Result Value Ref Range    Magnesium 1.9 1.7 - 2.3 mg/dL   NT-proBNP   Result Value Ref Range    NT-proBNP 2,330 (H) 0 - 852 pg/mL   Result Value Ref Range    Troponin T, High Sensitivity 22 <=22 ng/L   Influenza A/B, RSV and SARS-CoV2 PCR (COVID-19) Nose    Specimen: Nose; Swab   Result Value Ref Range    Influenza A PCR Negative Negative    Influenza B PCR Negative Negative    RSV PCR  Negative Negative    SARS CoV2 PCR Negative Negative   Result Value Ref Range    Procalcitonin 0.13 <0.50 ng/mL       RADIOLOGY:  XR Chest Port 1 View   Final Result   IMPRESSION: Heart and mediastinal size are normal. There is a small amount of streaky opacity involving the medial aspect of the left lung base representing either atelectasis/scarring versus infectious process. Right lung is clear. No pleural effusion    or pneumothorax.            EKG:    Date and time: July 8, 2025 at 1437  Rate: 115 bpm  Rhythm: Atrial fibrillation with RVR  QRS interval: 138 ms  QT/QTc: 314/434 ms  ST changes or T wave changes: No acute ST or T wave abnormalities  Change from prior ECG: Similar to EKG from earlier today that shows atrial fibrillation with RVR.  I have independently reviewed and interpreted this EKG.     Vashti Thomas M.D.  Emergency Medicine  [facility] EMERGENCY DEPARTMENT  [address] [facility] [address]  [phone]  Dept: [phone]       Vashti Thomas MD  07/08/25 6636

## 2025-07-08 NOTE — ED NOTES
[facility] ED Handoff Report    ED Chief Complaint: Atrial Fibrillation    ED Diagnosis:  (I48.91) Atrial fibrillation with RVR (H)    (I48.91) New onset atrial fibrillation (H)    (R42) Dizziness    (R53.1) Weakness generalized    PMH:    Past Medical History:   Diagnosis Date    Chronic sinusitis     Hoarseness 09/01/2018    Obstructive sleep apnea     Sleep apnea 09/01/2017    Tinnitus         Code Status:  No CPR- Do NOT Intubate     Falls Risk: No Band: Not applicable    Current Living Situation/Residence: lives with a significant other and lives in a house     Elimination Status: Continent: Yes     Activity Level: Independent    Patients Preferred Language:  English     Needed: No    Vital Signs:  BP (!) 143/67   Pulse 101   Temp 98.3  F (36.8  C) (Oral)   Resp 17   Ht 1.829 m (6')   Wt 86.2 kg (190 lb)   SpO2 99%   BMI 25.77 kg/m       Cardiac Rhythm: Afib    Pain Score: 0/10    Is the Patient Confused:  No    Last Food or Drink: 07/08/25 at currently eating dinner    Focused Assessment:  Pt went to clinic for general malaise/fatigue and found to have a new onset of Afib.  Pt going to be admitted for further work up on the heart like an echocardiogram.  Pt A&Ox4.  Able to make needs known.    Tests Performed: Done: Labs and Imaging    Treatments Provided:  See MAR    Family Dynamics/Concerns: No    Family Updated On Visitor Policy: Yes    Plan of Care Communicated to Family: Yes    Who Was Updated about Plan of Care: wife at bedside    Belongings Checklist Done and Signed by Patient: Yes    Medications sent with patient: NA    Covid: asymptomatic , negative    Additional Information: Pt is going to have CT scans tonight and an echo tomorrow.    Floridalma Jenkins RN 7/8/2025 6:58 PM

## 2025-07-08 NOTE — ED NOTES
Expected Patient Referral to ED  1:32 PM    Referring Clinic/Provider:  [facility]    Reason for referral/Clinical facts:  80 y/o m   Gen weak feeling unwell x10d  Nausea, lightheaded, fatigue, anorexia, wt loss  EKG - afib, no hx of same    Recommendations provided:  Send to ED for further evaluation    Caller was informed that this institution does possess the capabilities and/or resources to provide for patient and should be transferred to our facility.    Discussed that if direct admit is sought and any hurdles are encountered, this ED would be happy to see the patient and evaluate.    Informed caller that recommendations provided are recommendations based only on the facts provided and that they responsible to accept or reject the advice, or to seek a formal in person consultation as needed and that this ED will see/treat patient should they arrive.      Xenia Araya MD  [facility] EMERGENCY DEPARTMENT  [address] [place] [id number]  [phone]       Xenia Araya MD  07/08/25 5285

## 2025-07-09 ENCOUNTER — APPOINTMENT (OUTPATIENT)
Dept: CARDIOLOGY | Facility: HOSPITAL | Age: 80
DRG: 310 | End: 2025-07-09
Payer: COMMERCIAL

## 2025-07-09 LAB
ANION GAP SERPL CALCULATED.3IONS-SCNC: 8 MMOL/L (ref 7–15)
BUN SERPL-MCNC: 17.9 MG/DL (ref 8–23)
CALCIUM SERPL-MCNC: 8.8 MG/DL (ref 8.8–10.4)
CHLORIDE SERPL-SCNC: 104 MMOL/L (ref 98–107)
CREAT SERPL-MCNC: 1 MG/DL (ref 0.67–1.17)
EGFRCR SERPLBLD CKD-EPI 2021: 77 ML/MIN/1.73M2
ERYTHROCYTE [DISTWIDTH] IN BLOOD BY AUTOMATED COUNT: 14.2 % (ref 10–15)
GLUCOSE SERPL-MCNC: 94 MG/DL (ref 70–99)
HCO3 SERPL-SCNC: 26 MMOL/L (ref 22–29)
HCT VFR BLD AUTO: 37.1 % (ref 40–53)
HGB BLD-MCNC: 12.3 G/DL (ref 13.3–17.7)
MCH RBC QN AUTO: 30.4 PG (ref 26.5–33)
MCHC RBC AUTO-ENTMCNC: 33.2 G/DL (ref 31.5–36.5)
MCV RBC AUTO: 92 FL (ref 78–100)
PLATELET # BLD AUTO: 365 10E3/UL (ref 150–450)
POTASSIUM SERPL-SCNC: 4.8 MMOL/L (ref 3.4–5.3)
RBC # BLD AUTO: 4.04 10E6/UL (ref 4.4–5.9)
SODIUM SERPL-SCNC: 138 MMOL/L (ref 135–145)
UFH PPP CHRO-ACNC: 0.25 IU/ML (ref ?–1.1)
UFH PPP CHRO-ACNC: 0.96 IU/ML (ref ?–1.1)
WBC # BLD AUTO: 7.6 10E3/UL (ref 4–11)

## 2025-07-09 PROCEDURE — 85014 HEMATOCRIT: CPT

## 2025-07-09 PROCEDURE — 36415 COLL VENOUS BLD VENIPUNCTURE: CPT | Performed by: INTERNAL MEDICINE

## 2025-07-09 PROCEDURE — 99207 PR APP CREDIT; MD BILLING SHARED VISIT: CPT | Mod: FS

## 2025-07-09 PROCEDURE — 85520 HEPARIN ASSAY: CPT | Performed by: INTERNAL MEDICINE

## 2025-07-09 PROCEDURE — G0378 HOSPITAL OBSERVATION PER HR: HCPCS

## 2025-07-09 PROCEDURE — 120N000001 HC R&B MED SURG/OB

## 2025-07-09 PROCEDURE — 99232 SBSQ HOSP IP/OBS MODERATE 35: CPT | Mod: FS | Performed by: INTERNAL MEDICINE

## 2025-07-09 PROCEDURE — 99223 1ST HOSP IP/OBS HIGH 75: CPT | Mod: GC | Performed by: INTERNAL MEDICINE

## 2025-07-09 PROCEDURE — 96376 TX/PRO/DX INJ SAME DRUG ADON: CPT

## 2025-07-09 PROCEDURE — 250N000013 HC RX MED GY IP 250 OP 250 PS 637: Performed by: INTERNAL MEDICINE

## 2025-07-09 PROCEDURE — 999N000208 ECHOCARDIOGRAM COMPLETE

## 2025-07-09 PROCEDURE — 80048 BASIC METABOLIC PNL TOTAL CA: CPT

## 2025-07-09 PROCEDURE — 999N000147 HC STATISTIC PT IP EVAL DEFER

## 2025-07-09 PROCEDURE — 258N000003 HC RX IP 258 OP 636: Performed by: INTERNAL MEDICINE

## 2025-07-09 PROCEDURE — 93306 TTE W/DOPPLER COMPLETE: CPT | Mod: 26 | Performed by: INTERNAL MEDICINE

## 2025-07-09 PROCEDURE — 255N000002 HC RX 255 OP 636

## 2025-07-09 RX ORDER — METOPROLOL TARTRATE 25 MG/1
50 TABLET, FILM COATED ORAL 2 TIMES DAILY
Status: DISCONTINUED | OUTPATIENT
Start: 2025-07-09 | End: 2025-07-09

## 2025-07-09 RX ORDER — METOPROLOL TARTRATE 25 MG/1
25 TABLET, FILM COATED ORAL 2 TIMES DAILY
Status: DISCONTINUED | OUTPATIENT
Start: 2025-07-09 | End: 2025-07-10

## 2025-07-09 RX ORDER — SODIUM CHLORIDE 9 MG/ML
1000 INJECTION, SOLUTION INTRAVENOUS CONTINUOUS
Status: DISCONTINUED | OUTPATIENT
Start: 2025-07-09 | End: 2025-07-10 | Stop reason: HOSPADM

## 2025-07-09 RX ADMIN — METOPROLOL TARTRATE 25 MG: 25 TABLET, FILM COATED ORAL at 07:54

## 2025-07-09 RX ADMIN — METOPROLOL TARTRATE 25 MG: 25 TABLET, FILM COATED ORAL at 20:33

## 2025-07-09 RX ADMIN — APIXABAN 5 MG: 5 TABLET, FILM COATED ORAL at 10:28

## 2025-07-09 RX ADMIN — APIXABAN 5 MG: 5 TABLET, FILM COATED ORAL at 20:33

## 2025-07-09 RX ADMIN — PERFLUTREN 2 ML (DILUTED): 6.52 INJECTION, SUSPENSION INTRAVENOUS at 10:06

## 2025-07-09 ASSESSMENT — ACTIVITIES OF DAILY LIVING (ADL)
ADLS_ACUITY_SCORE: 19
ADLS_ACUITY_SCORE: 19
DIFFICULTY_EATING/SWALLOWING: NO
CHANGE_IN_FUNCTIONAL_STATUS_SINCE_ONSET_OF_CURRENT_ILLNESS/INJURY: NO
ADLS_ACUITY_SCORE: 19
ADLS_ACUITY_SCORE: 36
FALL_HISTORY_WITHIN_LAST_SIX_MONTHS: NO
ADLS_ACUITY_SCORE: 35
ADLS_ACUITY_SCORE: 19
ADLS_ACUITY_SCORE: 36
ADLS_ACUITY_SCORE: 36
DIFFICULTY_COMMUNICATING: NO
ADLS_ACUITY_SCORE: 35
ADLS_ACUITY_SCORE: 35
ADLS_ACUITY_SCORE: 36
ADLS_ACUITY_SCORE: 36
ADLS_ACUITY_SCORE: 35
ADLS_ACUITY_SCORE: 19
ADLS_ACUITY_SCORE: 35
ADLS_ACUITY_SCORE: 36
CONCENTRATING,_REMEMBERING_OR_MAKING_DECISIONS_DIFFICULTY: NO
WEAR_GLASSES_OR_BLIND: YES
ADLS_ACUITY_SCORE: 36
DOING_ERRANDS_INDEPENDENTLY_DIFFICULTY: NO
ADLS_ACUITY_SCORE: 36
VISION_MANAGEMENT: GLASSES
ADLS_ACUITY_SCORE: 19
ADLS_ACUITY_SCORE: 36
ADLS_ACUITY_SCORE: 19
ADLS_ACUITY_SCORE: 19
TOILETING_ISSUES: NO
ADLS_ACUITY_SCORE: 19
DRESSING/BATHING_DIFFICULTY: NO
WALKING_OR_CLIMBING_STAIRS_DIFFICULTY: NO

## 2025-07-09 NOTE — PROGRESS NOTES
[facility]    Medicine Progress Note - Hospitalist Service    Date of Admission:  7/8/2025    Assessment & Plan   Matthew Platt is a 79 year old male with PMHx of HTN, chronic sinusitis, KENNEDY who was admitted on 7/8/2025. He presented to the ER after feeling generally unwell, found to have A-fib with RVR which is a new diagnosis for him.  Ventricular rate improved s/p diltiazem and metoprolol in ER. cardiology consulted    Atrial Fibrillation with RVR   This is a NEW diagnosis for patient. HR 110s in the ED ; s/p 10 mg diltiazem in the ED   -- EKG A-fib with RVR with LBBB ; on chart review, has some previous documentation of LBBB from 2022  -- Trop 31 --> 22   -- PRN 5 mg IV metoprolol for HR > 100 q 6 hours   -- Echo- The left ventricle is normal in size. Image quality does not provide for detailed assessment of LV systolic function, but is felt to be moderately decreased with a visually estimated ejection fraction of roughly 40%. There is moderate global reduction in left ventricular systolic performance. There is abnormal septal motion likely related to altered electrical activation due to bundle branch block. There is mild to moderate concentric increase in left ventricular wall thickness. No significant valvular heart disease is identified on this study. On selected views, the right ventricle appears mildly enlarged with mild-moderately reduced systolic performance (the right ventricle is poorly visualized on the study).  8. There is mild to moderate left atrial enlargement  -- Cardiology consult-Had been started on heparin drip in ER. Cardiology changed to Eliquis BID. Plan for ALEXIS and cardioversion. No time available today so this will be done tomorrow 7/10 at 10 a.m.   -- Metoprolol 25 mg BID started per cardiology  -- CHADS score of at least 3 - pharm liaison for DOAC coverage - will defer to cards in the AM when to initiate   -- Continuous Telemonitoring   -- TSH normal   --  Chest CT-Moderate coronary artery calcification    Elevated BNP   BNP 2330 - does not appear significantly volume overloaded on exam - hold off on diuresis at this time  -- No signs of fluid congestion on CXR, CT chest ordered to further evaluate  -- Echo ordered as above  -- Cardiology consult as above  -- Daily weights   -- Cardiac monitoring     Generalized Malaise / Lightheadedness   Suspect secondary to the above +/- intolerance to recent Bactrim use  -- CXR showed streaky opacity involving the medial aspect of the left lung base representing either atelectasis/scarring versus infectious process   -- CT chest-Linear atelectasis in the lower lungs. Nothing definite for acute pneumonitis  -- CT head-No evidence for acute intracranial process  -- Negative Covid, flu, RSV  -- Procal normal   -- UA no infectious indicators  -- Orthostatics negative    Essential Hypertension   -- continue PTA lisinopril with hold parameters   -- close monitoring     Chronic Sinusitis   Was just seen by ENT and had swab done for Klebsiella - he completed 7 day course of Bactrim before discontinuing (felt nauseated and developed a rash)  -- Continue outpatient follow-up with ENT     KENNEDY noncompliant with CPAP, states would not utilize here        Observation Goals: -diagnostic tests and consults completed and resulted, -vital signs normal or at patient baseline, -returns to baseline functional status, -safe disposition plan has been identified, Nurse to notify provider when observation goals have been met and patient is ready for discharge.  Diet: Low Saturated Fat Na <2400 mg  NPO for Procedure/Surgery per Anesthesia Guidelines Except for: Meds; Clear liquids before procedure/surgery: ADULT (Age GREATER than or Equal to 18 years) - Clear liquids 2 hours before procedure/surgery    DVT Prophylaxis: DOAC  Ware Catheter: Not present  Lines: None     Cardiac Monitoring: ACTIVE order. Indication: Tachyarrhythmias, acute (48 hours)  Code  Status: No CPR- Do NOT Intubate      Clinically Significant Risk Factors Present on Admission                   # Hypertension: Noted on problem list           # Overweight: Estimated body mass index is 25.7 kg/m  as calculated from the following:    Height as of this encounter: 1.829 m (6').    Weight as of this encounter: 86 kg (189 lb 8 oz).       # Financial/Environmental Concerns: none         Social Drivers of Health    Tobacco Use: Medium Risk (7/8/2025)    Patient History     Smoking Tobacco Use: Former     Smokeless Tobacco Use: Never     Passive Exposure: Past   Social Connections: Unknown (11/10/2021)    Social Connection and Isolation Panel [NHANES]     Frequency of Communication with Friends and Family: More than three times a week     Frequency of Social Gatherings with Friends and Family: Twice a week     Active Member of Clubs or Organizations: Yes     Marital Status:           Disposition Plan     Medically Ready for Discharge: Anticipated Tomorrow           The patient's care was discussed with the Attending Physician, Dr. Nj and Patient.    Abi Garvin NP  Hospitalist Service  [facility]  Securely message with [employer] (more info)  Text page via [employer] Paging/Directory   ______________________________________________________________________    Interval History     Physical Exam   Vital Signs: Temp: 98.4  F (36.9  C) Temp src: Oral BP: 126/84 Pulse: 88   Resp: 18 SpO2: 96 % O2 Device: None (Room air)    Weight: 189 lbs 8 oz    Constitutional: awake, alert, cooperative, no apparent distress, and appears stated age  Respiratory: No increased work of breathing, good air exchange, clear to auscultation bilaterally, no crackles or wheezing  Cardiovascular: Normal apical impulse, regular rate and rhythm, normal S1 and S2, no S3 or S4, and no murmur noted  GI: No scars, normal bowel sounds, soft, non-distended, non-tender, no masses palpated, no  hepatosplenomegally    Medical Decision Making       60 MINUTES SPENT BY ME on the date of service doing chart review, history, exam, documentation & further activities per the note.      Data     I have personally reviewed the following data over the past 24 hrs:    7.6  \   12.3 (L)   / 365     138 104 17.9 /  94   4.8 26 1.00 \     Trop: 22 BNP: 2,330 (H)     TSH: 1.92 T4: N/A A1C: N/A     Procal: 0.13 CRP: N/A Lactic Acid: N/A         Imaging results reviewed over the past 24 hrs:   Recent Results (from the past 24 hours)   XR Chest Port 1 View    Narrative    EXAM: XR CHEST PORT 1 VIEW  LOCATION: [facility]  DATE: 7/8/2025    INDICATION: new a fib  COMPARISON: 9/11/2022      Impression    IMPRESSION: Heart and mediastinal size are normal. There is a small amount of streaky opacity involving the medial aspect of the left lung base representing either atelectasis/scarring versus infectious process. Right lung is clear. No pleural effusion   or pneumothorax.   CT Head w/o Contrast    Narrative    EXAM: CT HEAD W/O CONTRAST  LOCATION: [facility]  DATE: 7/8/2025    INDICATION: lightheadedness, nausea  COMPARISON: None.  TECHNIQUE: Routine CT Head without IV contrast. Multiplanar reformats. Dose reduction techniques were used.    FINDINGS:  INTRACRANIAL CONTENTS: No intracranial hemorrhage, extraaxial collection, or mass effect.  No CT evidence of acute infarct. Mild presumed chronic small vessel ischemic changes. Mild generalized volume loss. No hydrocephalus.     VISUALIZED ORBITS/SINUSES/MASTOIDS: No intraorbital abnormality. Partial opacification right maxillary sinus. No middle ear or mastoid effusion.    BONES/SOFT TISSUES: No acute abnormality.      Impression    IMPRESSION:  1.  No CT evidence for acute intracranial process.  2.  Mild chronic microvascular ischemic changes as above.     CT Chest w/o Contrast    Narrative    EXAM: CT CHEST W/O  CONTRAST  LOCATION: [facility]  DATE: 2025    INDICATION: Elevated BNP, abnormal finding on CXR.  COMPARISON: None.  TECHNIQUE: CT chest without IV contrast. Multiplanar reformats were obtained. Dose reduction techniques were used.  CONTRAST: None.    FINDINGS:   LUNGS AND PLEURA: Patchy areas of mostly linear atelectasis in both lower lobes. Nothing definite for acute pneumonitis. There is some slight mucus plugging in both lower lobes as well as a small amount of mucus/debris in the right mainstem bronchus. No   pleural effusions.    MEDIASTINUM/AXILLAE: No adenopathy. Normal heart size. No pericardial effusion. Normal-caliber thoracic aorta. Normal-variant aberrant right subclavian artery. Esophagus is grossly negative.    CORONARY ARTERY CALCIFICATION: Moderate.    UPPER ABDOMEN: Benign-appearing right renal cysts, partially visualized.    MUSCULOSKELETAL: Mild/moderate degenerative and hypertrophic changes of the thoracic spine.      Impression    IMPRESSION:   1.  Linear atelectasis in the lower lungs. Nothing definite for acute pneumonitis.    2.  Slight mucus plugging in the lower lobes as well as a small amount of mucous/debris in the right mainstem bronchus.    3.  Normal heart size. No effusions.    4.  Moderate coronary artery calcification.   Echocardiogram Complete    Narrative    [id number]  [id number]  [id number]  [id number]^IZABELA^MARIA [place] [address]     Name: JESUS KHAN  MRN: [id number]  : 1945  Study Date: 2025 09:46 AM  Age: 79 yrs  Gender: Male  Patient Location: [facility]  Reason For Study: Atrial Fibrillation  Ordering Physician: MARIA DEL ROSARIO GARCIA  Referring Physician: Maria Del Rosario garcia  Performed By: Yanni Madrigal     BSA: 2.1 m2  Height: 72 in  Weight: 189 lb  HR: 76  BP: 130/67 mmHg  ______________________________________________________________________________  Procedure  Echocardiogram with two-dimensional,  color and spectral Doppler. Definity (NDC  #[id number]) given intravenously.  ______________________________________________________________________________  Interpretation Summary     1. Technically difficult study.  2. The left ventricle is normal in size. Image quality does not provide for  detailed assessment of LV systolic function, but is felt to be moderately  decreased with a visually estimated ejection fraction of roughly 40%.  3. There is moderate global reduction in left ventricular systolic  performance.  4. There is abnormal septal motion likely related to altered electrical  activation due to bundle branch block.  5. There is mild to moderate concentric increase in left ventricular wall  thickness.  6. No significant valvular heart disease is identified on this study.  7. On selected views, the right ventricle appears mildly enlarged with mild-  moderately reduced systolic performance (the right ventricle is poorly  visualized on the study).  8. There is mild to moderate left atrial enlargement.  ______________________________________________________________________________  Left ventricle:  The left ventricle is normal in size. Image quality does not provide for  detailed assessment of LV systolic function, but is felt to be moderately  decreased with a visually estimated ejection fraction of roughly 40%. There is  moderate global reduction in left ventricular systolic performance. There is  abnormal septal motion likely related to altered electrical activation due to  bundle branch block. There is mild to moderate concentric increase in left  ventricular wall thickness.     Right ventricle:  On selected views, the right ventricle appears mildly enlarged with mild-  moderately reduced systolic performance (the right ventricle is poorly  visualized on the study).     Left atrium:  There is mild to moderate left atrial enlargement.     Right atrium:  Right atrium is not well-visualized.     IVC:  The IVC  is borderline dilated.     Aortic valve:  The aortic valve is not well visualized, but suspected to be comprised of  three cusps. No significant aortic stenosis or aortic insufficiency is  detected on this study.     Mitral valve:  The mitral valve appears morphologically normal. There is trace mitral  insufficiency.     Tricuspid valve:  The tricuspid valve is grossly morphologically normal. There is trace  tricuspid insufficiency.     Pulmonic valve:  The pulmonic valve is not well visualized.     Thoracic aorta:  The aortic root and proximal ascending aorta are of normal dimension.     Pericardium:  There is no significant pericardial effusion.  ______________________________________________________________________________  ______________________________________________________________________________  MMode/2D Measurements & Calculations  IVSd: 1.8 cm  LVIDd: 5.0 cm  LVIDs: 3.9 cm  LVPWd: 1.4 cm  FS: 21.6 %  LV mass(C)d: 337.0 grams  LV mass(C)dI: 162.0 grams/m2  Ao root diam: 3.5 cm  asc Aorta Diam: 3.4 cm  LVOT diam: 2.0 cm  LVOT area: 3.1 cm2  Ao root diam index Ht(cm/m): 1.9  Ao root diam index BSA (cm/m2): 1.7  Asc Ao diam index BSA (cm/m2): 1.6  Asc Ao diam index Ht(cm/m): 1.9  EF Biplane: 43.7 %  LA Volume (BP): 68.2 ml     LA Volume Index (BP): 32.8 ml/m2  LA Volume Indexed (AL/bp): 34.5 ml/m2  RWT: 0.55  TAPSE: 2.0 cm     Doppler Measurements & Calculations  MV E max jon: 96.0 cm/sec  MV dec slope: 619.0 cm/sec2  MV dec time: 0.16 sec  Ao V2 max: 120.3 cm/sec  Ao max P.0 mmHg  Ao V2 mean: 88.5 cm/sec  Ao mean P.0 mmHg  Ao V2 VTI: 23.5 cm  ANSHU(I,D): 2.0 cm2  ANSHU(V,D): 2.2 cm2  LV V1 max PG: 3.0 mmHg  LV V1 max: 85.9 cm/sec  LV V1 VTI: 14.7 cm  SV(LVOT): 46.1 ml  SI(LVOT): 22.2 ml/m2  PA V2 max: 68.5 cm/sec  PA max P.9 mmHg  PA acc time: 0.10 sec  AV Jon Ratio (DI): 0.71  ANSHU Index (cm2/m2): 0.94  E/E' av.1  Lateral E/e': 10.4  Medial E/e': 15.9  RV S Jon: 11.4 cm/sec      ______________________________________________________________________________  Report approved by: Vinayak Macdonald MD on 07/09/2025 11:18 AM

## 2025-07-09 NOTE — PLAN OF CARE
"PRIMARY DIAGNOSIS: \"GENERIC\" NURSING  OUTPATIENT/OBSERVATION GOALS TO BE MET BEFORE DISCHARGE:  ADLs back to baseline: Yes    Activity and level of assistance: Ambulating independently. Voiding well.    Pain status: Pain free.    Return to near baseline physical activity: Yes     Discharge Planner Nurse   Safe discharge environment identified: No  Barriers to discharge: Yes       Entered by: Sabi Allen RN 07/09/2025 12:44 AM     Please review provider order for any additional goals.   Nurse to notify provider when observation goals have been met and patient is ready for discharge.       Pt denies nausea. NPO after midnight. On heparin drip at 1050 unit(s)/hr. Antixa came back at 0.25 - no dose change. On tele - Afib. Echo, cards, and PT in the morning. Pt reported minimal sleep tonight. Pt A&O x4 and can make his needs known.   "

## 2025-07-09 NOTE — PLAN OF CARE
"PRIMARY DIAGNOSIS: \"GENERIC\" NURSING  OUTPATIENT/OBSERVATION GOALS TO BE MET BEFORE DISCHARGE:  ADLs back to baseline: Yes    Activity and level of assistance: Ambulating independently.    Pain status: Pain free.    Return to near baseline physical activity: Yes     Discharge Planner Nurse   Safe discharge environment identified: No  Barriers to discharge: Yes       Entered by: Sabi Allen RN 07/09/2025 12:44 AM     Please review provider order for any additional goals.   Nurse to notify provider when observation goals have been met and patient is ready for discharge.  "

## 2025-07-09 NOTE — CONSULTS
Care Management Initial Consult    General Information  Assessment completed with: Patient,    Type of CM/SW Visit: Initial Assessment    Primary Care Provider verified and updated as needed: Yes   Readmission within the last 30 days: no previous admission in last 30 days      Reason for Consult: discharge planning  Advance Care Planning: Advance Care Planning Reviewed: no concerns identified          Communication Assessment  Patient's communication style: spoken language (English or Bilingual)             Cognitive  Cognitive/Neuro/Behavioral: WDL                      Living Environment:   People in home: spouse     Current living Arrangements: house      Able to return to prior arrangements: yes       Family/Social Support:  Care provided by: self  Provides care for: no one  Marital Status:   Support system: Wife  Zahra       Description of Support System: Supportive, Involved    Support Assessment: Patient communicates needs well met    Current Resources:   Patient receiving home care services: No        Community Resources: None  Equipment currently used at home: none  Supplies currently used at home: None    Employment/Financial:  Employment Status: retired, , previous service        Financial Concerns: none   Referral to Financial Worker: No       Does the patient's insurance plan have a 3 day qualifying hospital stay waiver?  Yes     Which insurance plan 3 day waiver is available? Alternative insurance waiver    Will the waiver be used for post-acute placement? No    Lifestyle & Psychosocial Needs:  Social Drivers of Health     Food Insecurity: Low Risk  (11/27/2023)    Food Insecurity     Within the past 12 months, did you worry that your food would run out before you got money to buy more?: No     Within the past 12 months, did the food you bought just not last and you didn t have money to get more?: No   Depression: Not at risk (8/1/2024)    PHQ-2     PHQ-2 Score: 0   Housing Stability: Low  Risk  (11/27/2023)    Housing Stability     Do you have housing? : Yes     Are you worried about losing your housing?: No   Tobacco Use: Medium Risk (7/8/2025)    Patient History     Smoking Tobacco Use: Former     Smokeless Tobacco Use: Never     Passive Exposure: Past   Financial Resource Strain: Low Risk  (11/27/2023)    Financial Resource Strain     Within the past 12 months, have you or your family members you live with been unable to get utilities (heat, electricity) when it was really needed?: No   Alcohol Use: Not At Risk (11/10/2021)    AUDIT-C     Frequency of Alcohol Consumption: 4 or more times a week     Average Number of Drinks: 1 or 2     Frequency of Binge Drinking: Never   Transportation Needs: Low Risk  (11/27/2023)    Transportation Needs     Within the past 12 months, has lack of transportation kept you from medical appointments, getting your medicines, non-medical meetings or appointments, work, or from getting things that you need?: No   Physical Activity: Sufficiently Active (11/10/2021)    Exercise Vital Sign     Days of Exercise per Week: 5 days     Minutes of Exercise per Session: 40 min   Interpersonal Safety: Low Risk  (8/1/2024)    Interpersonal Safety     Do you feel physically and emotionally safe where you currently live?: Yes     Within the past 12 months, have you been hit, slapped, kicked or otherwise physically hurt by someone?: No     Within the past 12 months, have you been humiliated or emotionally abused in other ways by your partner or ex-partner?: No   Stress: No Stress Concern Present (11/10/2021)    [employer] [place] [employer] - Occupational Stress Questionnaire     Feeling of Stress : Only a little   Social Connections: Unknown (11/10/2021)    Social Connection and Isolation Panel [NHANES]     Frequency of Communication with Friends and Family: More than three times a week     Frequency of Social Gatherings with Friends and Family: Twice a week     Attends  Gnosticist Services: Not on file     Active Member of Clubs or Organizations: Yes     Attends Club or Organization Meetings: Not on file     Marital Status:    Health Literacy: Not on file       Functional Status:  Prior to admission patient needed assistance:   Dependent ADLs:: Independent  Dependent IADLs:: Independent       Mental Health Status:  Mental Health Status: No Current Concerns       Chemical Dependency Status:  Chemical Dependency Status: No Current Concerns             Values/Beliefs:  Spiritual, Cultural Beliefs, Gnosticist Practices, Values that affect care: no               Discussed  Partnership in Safe Discharge Planning  document with patient/family: No    Additional Information:  CM consult received for discharge planning.  Met with patient at bedside to introduce CM role and perform initial assessment.  Demographics verified and updated as needed.  Reviewed observation status billing under Medicare guidelines and provided a copy of the [employer] brochure.  Carmelo lives in a house with his spouse Zahra.  He is independent with ADLs and IADLs.  No in-home services or equipment needs.  Patient plans to return home when ready for discharge and does not anticipate any needs.  Family can transport.      Next Steps: No further care management intervention anticipated at this time.  Please re-consult if further needs arise.  Care management signing off.        Ady Dallas CRNI

## 2025-07-09 NOTE — PLAN OF CARE
Goal Outcome Evaluation:      Plan of Care Reviewed With: patient          Outcome Evaluation: Plan to return to home when medically ready for discharge.

## 2025-07-09 NOTE — PROGRESS NOTES
"PRIMARY DIAGNOSIS: \"GENERIC\" NURSING  OUTPATIENT/OBSERVATION GOALS TO BE MET BEFORE DISCHARGE:  ADLs back to baseline: Yes    Activity and level of assistance: Ambulating independently.    Pain status: Pain free.    Return to near baseline physical activity: Yes     Discharge Planner Nurse   Safe discharge environment identified: Yes  Barriers to discharge: Yes       Entered by: Favian Willis RN 07/08/2025 10:57 PM      A & O x 4. VSS on RA. Denies pain/nausea/SOB. Tele: Afib. Heparin infusing via L PIV @ 1050 unit(s)/hr at end of shift. Anti-Xa draw @ 0420. Cardiac diet. NPO @ midnight. Up independently. Hospitalist, cardiology following. Nursing continue to monitor.  "

## 2025-07-09 NOTE — CONSULTS
[facility]   [address], [place] [address]   [email or web address]   OFFICE: [phone]     CARDIOLOGY INPATIENT CONSULT NOTE     Impression and Plan     Assessment:  Atrial fibrillation: New onset atrial fibrillation for unknown duration.  Symptoms have been ongoing for about 1 to 2 weeks now.  Main symptoms are of increased fatigue/generalized weakness.  Recommend rhythm control.  We discussed DCCV as outpatient after 3 weeks of anticoagulation versus more urgent ALEXIS/DCCV during this admission.  Given ongoing symptoms as well as decline in left ventricular ejection fraction we will proceed with ALEXIS/DCCV.  Cardiomyopathy: Left ventricular ejection fraction declined to 40%.  This is in the setting of tachycardia and left bundle branch block.  Recommend reevaluation of left ventricular ejection fraction once sinus rhythm restored.  Anticoagulation: CHADS2 vascular score of 3 for age and hypertension.  May stop heparin drip and starting Eliquis 5 mg twice daily  Hypertension: Well-controlled  KENNEDY on CPAP    Plan:  ALEXIS/DCCV will be done in the OR  Stop heparin drip and start Eliquis 5 mg twice daily  Continue on metoprolol 25 mg twice daily.  If heart rates remain well-controlled we will switch to long-acting Toprol-XL  Continue lisinopril      History of Present Illness      Mr. Matthew Platt is a 79 year old male with HTN, KENNEDY who presents to the ED with new onset AF with RVR while at urgent care prior to ED presentation. Patient states he has not felt well, generally fatigued over the past 10 days. He says this all began after being treated with Bactrim. He thought it was just side effects but symptoms persisted after ending his 7d course so he went to urgent care where he was found to be in AF RVR. Denies CP, SOB, palpitations, syncope. Only significant cardiac work-up in his history was in 2022 after he had a syncopal episode while golfing in the  summer heat. ECHO, stress test, and cardiac monitoring unremarkable and event thought to be syncopal due to dehydration. His Father had a stroke in his 70s as well as an ablation.      Other than noted above, Mr. Platt denies any chest pain/pressure/tightness, shortness of breath at rest or with exertion, light headedness/dizziness, pre-syncope, syncope, lower extremity swelling, palpitations, paroxysmal nocturnal dyspnea (PND), or orthopnea.    Review of Systems:  Further review of systems is otherwise negative/noncontributory (based on review of medical record (admission H&P) and 13 point review of systems reviewed. Pertinent positives noted).    Cardiac Diagnostics     ECG: Reviewed and interpreted with Dr. Cardona. New onset AF with LBBB.      Most recent:  Echocardiogram (results reviewed): Final results pending.     Previous ECHO (2022): EF 61%, no valvular or chamber abnormalities.     Cardiac Stress Testing (2022): Negative for inducible myocardial ischemia. LVEF of 68%.         Medical History  Surgical History Family History Social History   Past Medical History:   Diagnosis Date    Chronic sinusitis     Hoarseness 09/01/2018    Obstructive sleep apnea     Sleep apnea 09/01/2017    Tinnitus      Past Surgical History:   Procedure Laterality Date    CATARACT EXTRACTION, BILATERAL Bilateral     ENT SURGERY      OTHER SURGICAL HISTORY Bilateral     facial plastic surgery    PE TUBES      RELEASE DUPUYTRENS CONTRACTURE Left     4th finger    TONSILLECTOMY      TONSILLECTOMY      VASECTOMY       Family History   Problem Relation Age of Onset    Cancer Father     Pancreatic Cancer Father 87.00    Hepatitis Mother     No Known Problems Sister     No Known Problems Daughter     No Known Problems Son            Social History     Socioeconomic History    Marital status:      Spouse name: Not on file    Number of children: Not on file    Years of education: Not on file    Highest education level: Not on  file   Occupational History    Not on file   Tobacco Use    Smoking status: Former     Current packs/day: 0.00     Average packs/day: 1 pack/day for 7.3 years (7.3 ttl pk-yrs)     Types: Cigarettes, Cigars, Pipe     Start date: 1966     Quit date: 1974     Years since quittin.5     Passive exposure: Past    Smokeless tobacco: Never   Vaping Use    Vaping status: Never Used   Substance and Sexual Activity    Alcohol use: Yes     Alcohol/week: 7.0 standard drinks of alcohol     Types: 7 Glasses of wine per week    Drug use: No    Sexual activity: Not on file   Other Topics Concern    Not on file   Social History Narrative    [id number]  [employer]  [employer] [id number]    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  Daughter Emma  Son -  3 grandchildren     Social Drivers of Health     Financial Resource Strain: Low Risk  (2025)    Financial Resource Strain     Within the past 12 months, have you or your family members you live with been unable to get utilities (heat, electricity) when it was really needed?: No   Food Insecurity: Low Risk  (2025)    Food Insecurity     Within the past 12 months, did you worry that your food would run out before you got money to buy more?: No     Within the past 12 months, did the food you bought just not last and you didn t have money to get more?: No   Transportation Needs: Low Risk  (2025)    Transportation Needs     Within the past 12 months, has lack of transportation kept you from medical appointments, getting your medicines, non-medical meetings or appointments, work, or from getting things that you need?: No   Physical Activity: Sufficiently Active (11/10/2021)    Exercise Vital Sign     Days of Exercise per Week: 5 days     Minutes of Exercise per Session: 40 min   Stress: No Stress Concern Present (11/10/2021)    [employer] - Occupational Stress Questionnaire      Feeling of Stress : Only a little   Social Connections: Unknown (11/10/2021)    Social Connection and Isolation Panel [NHANES]     Frequency of Communication with Friends and Family: More than three times a week     Frequency of Social Gatherings with Friends and Family: Twice a week     Attends Pent[employer]ism Services: Not on file     Active Member of Clubs or Organizations: Yes     Attends Club or Organization Meetings: Not on file     Marital Status:    Interpersonal Safety: Low Risk  (8/1/2024)    Interpersonal Safety     Do you feel physically and emotionally safe where you currently live?: Yes     Within the past 12 months, have you been hit, slapped, kicked or otherwise physically hurt by someone?: No     Within the past 12 months, have you been humiliated or emotionally abused in other ways by your partner or ex-partner?: No   Housing Stability: Low Risk  (7/8/2025)    Housing Stability     Do you have housing? : Yes     Are you worried about losing your housing?: No             Physical Examination   VITALS: /67 (BP Location: Right arm)   Pulse 92   Temp 98.4  F (36.9  C) (Oral)   Resp 18   Ht 1.829 m (6')   Wt 86 kg (189 lb 8 oz)   SpO2 99%   BMI 25.70 kg/m    BMI: Body mass index is 25.7 kg/m .  Wt Readings from Last 3 Encounters:   07/09/25 86 kg (189 lb 8 oz)   07/08/25 86.2 kg (190 lb)   08/01/24 91.4 kg (201 lb 6.4 oz)       Intake/Output Summary (Last 24 hours) at 7/9/2025 1008  Last data filed at 7/9/2025 0544  Gross per 24 hour   Intake 72 ml   Output --   Net 72 ml       General: pleasant male. No acute distress.   Neck: No JVD  Lungs: clear to auscultation  COR: AF on monitor with rate in 80s.   Abd: nondistended, BS present  Extrem: No edema         Non-cardiac Imaging Studies Reviewed        CT-Chest: Linear atelectasis in lower lungs. No effusions. Normal heart size. Moderate coronary artery calcification.     CXR: Heart and mediastinal size are normal. There is a small amount  "of streaky opacity involving the medial aspect of the left lung base representing either atelectasis/scarring versus infectious process. Right lung is clear. No pleural effusion or pneumothorax.     Lab Results Reviewed    Chemistry/lipid CBC Cardiac Enzymes/BNP/TSH/INR   Recent Labs   Lab Test 12/07/23  0918   CHOL 235*   HDL 57   *   TRIG 88     Recent Labs   Lab Test 12/07/23  0918 11/12/21  1231   * 150*     Recent Labs   Lab Test 07/09/25  0418      POTASSIUM 4.8   CHLORIDE 104   CO2 26   GLC 94   BUN 17.9   CR 1.00   GFRESTIMATED 77   EFFIE 8.8     Recent Labs   Lab Test 07/09/25  0418 07/08/25  1425 10/22/24  0918   CR 1.00 0.98 1.01     No results for input(s): \"A1C\" in the last 62830 hours.       Recent Labs   Lab Test 07/09/25  0418   WBC 7.6   HGB 12.3*   HCT 37.1*   MCV 92        Recent Labs   Lab Test 07/09/25  0418 07/08/25  1425 11/12/21  1231   HGB 12.3* 14.1 13.5    No results for input(s): \"TROPONINI\" in the last 98088 hours.  Recent Labs   Lab Test 07/08/25  1425   NTBNP 2,330*     Recent Labs   Lab Test 07/08/25  1425   TSH 1.92     No results for input(s): \"INR\" in the last 02598 hours.        Current Inpatient Scheduled Medications   Scheduled Meds:  Current Facility-Administered Medications   Medication Dose Route Frequency Provider Last Rate Last Admin    apixaban ANTICOAGULANT (ELIQUIS) tablet 5 mg  5 mg Oral BID Keiry Cardona MD        [Held by provider] lisinopril (ZESTRIL) tablet 20 mg  20 mg Oral Daily Maria Del Rosario Rose PA-C        metoprolol tartrate (LOPRESSOR) tablet 50 mg  50 mg Oral BID Keiry Cardona MD         Continuous Infusions:  Current Facility-Administered Medications   Medication Dose Route Frequency Provider Last Rate Last Admin       No current outpatient medications on file.          Medications Prior to Admission   Prior to Admission medications    Medication Sig Start Date End Date Taking? Authorizing Provider   lisinopril (ZESTRIL) 20 MG " tablet Take 20 mg by mouth daily.   Yes Reported, Patient   Multiple Vitamins-Minerals (PRESERVISION AREDS 2 PO) Take 1 tablet by mouth daily.   Yes Reported, Patient          Chandra Hooker MD    Patient seen and examined with Dr Hooker.  Agree with findings including HPI, physical exam, assessment and plan.  Spent 60 minutes.  Keiry Cardona MD

## 2025-07-09 NOTE — PROGRESS NOTES
Patient admitted to room 8 at approximately 2020 via wheel chair from emergency room.  Reason for Admission: Dizziness, new onset Afib  Report received from:   Patient was accompanied by Self.  Discharge transportation provided by:  Patient ambulated/transferred:  independently. self.  Patient is alert and orientated x 3.  Outpatient Observation education provided to: (patient, family, friend)  MDRO Education done if applicable (MRSA, VRE, etc)  Safety risks were identified during admission:  none.   Yellow risk/fall band applied:  No  Home meds sent home: No  Home meds sent to pharmacy:No IF YES add 1/2 sheet laminated page reminder to chart/clipboard   Detailed Belongings: $20, credit cards, cell phone, shorts, shirt, flip flops, wallet, reading glasses

## 2025-07-09 NOTE — PLAN OF CARE
Physical Therapy: Orders received. Chart reviewed and discussed with care team.? Physical Therapy not indicated due to per chart review and discussion with pt's RN, patient has been amb IND in room and to/from restroom steady on feet without assistive device, and his dizziness has been resolved.?No skilled PT needs at this time. Defer discharge recommendations to medical care team.? Will complete orders.

## 2025-07-09 NOTE — PLAN OF CARE
Problem: Dysrhythmia  Goal: Normalized Cardiac Rhythm  Outcome: Progressing       Goal Outcome Evaluation:    A&Ox4, VSS on RA. Denies pain, SOB, & dizziness. Ambulating to bathroom independently. Tele: A-fib with BBB. Low saturated fat diet. NPO @ midnight. PT/ Cards following.       Plan for ALEXIS conversion @ 1000 7/10.

## 2025-07-09 NOTE — PLAN OF CARE
Problem: Delirium  Goal: Improved Sleep  7/9/2025 1129 by Michelle Gutiérrez, RN  Outcome: Progressing  7/9/2025 1059 by Michelle Gutiérrez, RN  Outcome: Progressing     Problem: Delirium  Goal: Improved Attention and Thought Clarity  7/9/2025 1129 by Michelle Gutiérrez, RN  Outcome: Progressing  7/9/2025 1059 by Michelle Gutiérrez, RN  Outcome: Progressing     Problem: Delirium  Goal: Improved Behavioral Control  7/9/2025 1129 by Michelle Gutiérrez, RN  Outcome: Progressing  7/9/2025 1059 by Michelle Gutiérrez, RN  Outcome: Progressing  Intervention: Minimize Safety Risk  Recent Flowsheet Documentation  Taken 7/9/2025 0748 by Michelle Gutiérrez, RN  Enhanced Safety Measures: pain management       Goal Outcome Evaluation:    A&Ox4, VSS on RA. Tele: a-fib. Denies pain, SOB, nausea. Ambulating unit independently. Low saturated diet. Echo completed. PT/cards following.     NPO @ midnight for ALEXIS conversion @ 1000am.

## 2025-07-09 NOTE — PROGRESS NOTES
ALEXIS/CV scheduled for 7/10/25 at 1000am in the OR.  Pt will need to be NPO after midnight tonight.  Eve Verma RN

## 2025-07-09 NOTE — CONSULTS
7/9 Test claim for DOAC'S- Eliquis $148 for 30 days, Xarelto $146 for 30 days, Pradaxa $68 for 30 days and Warfarin covered 100%, this will be the cost until the end of the year or if the patient pays $2000 out of pocket then the cost will be $0 the remainder of the year. Thank you for allowing me to help with your patient  Edda Argueta [facility]  Pharmacy Discharge Liaison   [facility] locations  Available on [facility] and [facility]

## 2025-07-09 NOTE — PLAN OF CARE
"PRIMARY DIAGNOSIS: \"GENERIC\" NURSING  OUTPATIENT/OBSERVATION GOALS TO BE MET BEFORE DISCHARGE:  ADLs back to baseline: Yes    Activity and level of assistance: Ambulating independently.    Pain status: Pain free.    Return to near baseline physical activity: Yes    Please review provider order for any additional goals.     A&Ox4,VSS on RA. Pt ambulating unit independently.Tele: A-fib.     Nurse to notify provider when observation goals have been met and patient is ready for discharge.  "

## 2025-07-10 ENCOUNTER — APPOINTMENT (OUTPATIENT)
Dept: CARDIOLOGY | Facility: HOSPITAL | Age: 80
DRG: 310 | End: 2025-07-10
Attending: INTERNAL MEDICINE
Payer: COMMERCIAL

## 2025-07-10 ENCOUNTER — ANESTHESIA (OUTPATIENT)
Dept: SURGERY | Facility: HOSPITAL | Age: 80
End: 2025-07-10
Payer: COMMERCIAL

## 2025-07-10 ENCOUNTER — ORDERS ONLY (AUTO-RELEASED) (OUTPATIENT)
Dept: MEDSURG UNIT | Facility: HOSPITAL | Age: 80
End: 2025-07-10

## 2025-07-10 ENCOUNTER — ANESTHESIA EVENT (OUTPATIENT)
Dept: SURGERY | Facility: HOSPITAL | Age: 80
End: 2025-07-10
Payer: COMMERCIAL

## 2025-07-10 VITALS
RESPIRATION RATE: 16 BRPM | SYSTOLIC BLOOD PRESSURE: 122 MMHG | HEART RATE: 63 BPM | OXYGEN SATURATION: 99 % | BODY MASS INDEX: 25.67 KG/M2 | TEMPERATURE: 98.3 F | WEIGHT: 189.5 LBS | DIASTOLIC BLOOD PRESSURE: 60 MMHG | HEIGHT: 72 IN

## 2025-07-10 DIAGNOSIS — I48.91 ATRIAL FIBRILLATION WITH RVR (H): ICD-10-CM

## 2025-07-10 LAB
ANION GAP SERPL CALCULATED.3IONS-SCNC: 10 MMOL/L (ref 7–15)
ATRIAL RATE - MUSE: 62 BPM
ATRIAL RATE - MUSE: NORMAL BPM
BACTERIA SPEC CULT: NORMAL
BUN SERPL-MCNC: 16.4 MG/DL (ref 8–23)
CALCIUM SERPL-MCNC: 9.3 MG/DL (ref 8.8–10.4)
CHLORIDE SERPL-SCNC: 104 MMOL/L (ref 98–107)
CREAT SERPL-MCNC: 0.96 MG/DL (ref 0.67–1.17)
DIASTOLIC BLOOD PRESSURE - MUSE: NORMAL MMHG
DIASTOLIC BLOOD PRESSURE - MUSE: NORMAL MMHG
EGFRCR SERPLBLD CKD-EPI 2021: 80 ML/MIN/1.73M2
ERYTHROCYTE [DISTWIDTH] IN BLOOD BY AUTOMATED COUNT: 14.4 % (ref 10–15)
GLUCOSE SERPL-MCNC: 90 MG/DL (ref 70–99)
HCO3 SERPL-SCNC: 22 MMOL/L (ref 22–29)
HCT VFR BLD AUTO: 41.1 % (ref 40–53)
HGB BLD-MCNC: 13.5 G/DL (ref 13.3–17.7)
INTERPRETATION ECG - MUSE: NORMAL
INTERPRETATION ECG - MUSE: NORMAL
LVEF ECHO: NORMAL
MCH RBC QN AUTO: 30.1 PG (ref 26.5–33)
MCHC RBC AUTO-ENTMCNC: 32.8 G/DL (ref 31.5–36.5)
MCV RBC AUTO: 92 FL (ref 78–100)
P AXIS - MUSE: 77 DEGREES
P AXIS - MUSE: NORMAL DEGREES
PLATELET # BLD AUTO: 427 10E3/UL (ref 150–450)
POTASSIUM SERPL-SCNC: 4.7 MMOL/L (ref 3.4–5.3)
PR INTERVAL - MUSE: 202 MS
PR INTERVAL - MUSE: NORMAL MS
QRS DURATION - MUSE: 120 MS
QRS DURATION - MUSE: 136 MS
QT - MUSE: 364 MS
QT - MUSE: 434 MS
QTC - MUSE: 440 MS
QTC - MUSE: 537 MS
R AXIS - MUSE: -44 DEGREES
R AXIS - MUSE: -6 DEGREES
RBC # BLD AUTO: 4.48 10E6/UL (ref 4.4–5.9)
SODIUM SERPL-SCNC: 136 MMOL/L (ref 135–145)
SYSTOLIC BLOOD PRESSURE - MUSE: NORMAL MMHG
SYSTOLIC BLOOD PRESSURE - MUSE: NORMAL MMHG
T AXIS - MUSE: 131 DEGREES
T AXIS - MUSE: 147 DEGREES
VENTRICULAR RATE- MUSE: 131 BPM
VENTRICULAR RATE- MUSE: 62 BPM
WBC # BLD AUTO: 7 10E3/UL (ref 4–11)

## 2025-07-10 PROCEDURE — 85027 COMPLETE CBC AUTOMATED: CPT

## 2025-07-10 PROCEDURE — 370N000017 HC ANESTHESIA TECHNICAL FEE, PER MIN: Performed by: INTERNAL MEDICINE

## 2025-07-10 PROCEDURE — 93312 ECHO TRANSESOPHAGEAL: CPT | Mod: 26 | Performed by: INTERNAL MEDICINE

## 2025-07-10 PROCEDURE — 99239 HOSP IP/OBS DSCHRG MGMT >30: CPT | Mod: FS | Performed by: EMERGENCY MEDICINE

## 2025-07-10 PROCEDURE — C8925 2D TEE W OR W/O FOL W/CON,IN: HCPCS | Performed by: INTERNAL MEDICINE

## 2025-07-10 PROCEDURE — 250N000013 HC RX MED GY IP 250 OP 250 PS 637

## 2025-07-10 PROCEDURE — 5A2204Z RESTORATION OF CARDIAC RHYTHM, SINGLE: ICD-10-PCS | Performed by: INTERNAL MEDICINE

## 2025-07-10 PROCEDURE — 93320 DOPPLER ECHO COMPLETE: CPT | Mod: 26 | Performed by: INTERNAL MEDICINE

## 2025-07-10 PROCEDURE — B246ZZ4 ULTRASONOGRAPHY OF RIGHT AND LEFT HEART, TRANSESOPHAGEAL: ICD-10-PCS | Performed by: INTERNAL MEDICINE

## 2025-07-10 PROCEDURE — 93325 DOPPLER ECHO COLOR FLOW MAPG: CPT

## 2025-07-10 PROCEDURE — 99232 SBSQ HOSP IP/OBS MODERATE 35: CPT | Mod: FS | Performed by: INTERNAL MEDICINE

## 2025-07-10 PROCEDURE — 258N000003 HC RX IP 258 OP 636: Performed by: NURSE ANESTHETIST, CERTIFIED REGISTERED

## 2025-07-10 PROCEDURE — 99207 PR APP CREDIT; MD BILLING SHARED VISIT: CPT | Mod: FS

## 2025-07-10 PROCEDURE — 93325 DOPPLER ECHO COLOR FLOW MAPG: CPT | Mod: 26 | Performed by: INTERNAL MEDICINE

## 2025-07-10 PROCEDURE — G0378 HOSPITAL OBSERVATION PER HR: HCPCS

## 2025-07-10 PROCEDURE — 36415 COLL VENOUS BLD VENIPUNCTURE: CPT

## 2025-07-10 PROCEDURE — 258N000003 HC RX IP 258 OP 636: Performed by: ANESTHESIOLOGY

## 2025-07-10 PROCEDURE — 250N000013 HC RX MED GY IP 250 OP 250 PS 637: Performed by: INTERNAL MEDICINE

## 2025-07-10 PROCEDURE — 80048 BASIC METABOLIC PNL TOTAL CA: CPT

## 2025-07-10 PROCEDURE — 250N000011 HC RX IP 250 OP 636: Performed by: NURSE ANESTHETIST, CERTIFIED REGISTERED

## 2025-07-10 PROCEDURE — 92960 CARDIOVERSION ELECTRIC EXT: CPT | Performed by: INTERNAL MEDICINE

## 2025-07-10 PROCEDURE — 99207 PR APP CREDIT; MD BILLING SHARED VISIT: CPT | Mod: FS | Performed by: PHYSICIAN ASSISTANT

## 2025-07-10 RX ORDER — METOPROLOL TARTRATE 25 MG/1
25 TABLET, FILM COATED ORAL ONCE
Status: DISCONTINUED | OUTPATIENT
Start: 2025-07-10 | End: 2025-07-10

## 2025-07-10 RX ORDER — PROPOFOL 10 MG/ML
INJECTION, EMULSION INTRAVENOUS CONTINUOUS PRN
Status: DISCONTINUED | OUTPATIENT
Start: 2025-07-10 | End: 2025-07-10

## 2025-07-10 RX ORDER — METOPROLOL SUCCINATE 50 MG/1
50 TABLET, EXTENDED RELEASE ORAL AT BEDTIME
Status: DISCONTINUED | OUTPATIENT
Start: 2025-07-10 | End: 2025-07-10 | Stop reason: HOSPADM

## 2025-07-10 RX ORDER — SODIUM CHLORIDE, SODIUM LACTATE, POTASSIUM CHLORIDE, CALCIUM CHLORIDE 600; 310; 30; 20 MG/100ML; MG/100ML; MG/100ML; MG/100ML
INJECTION, SOLUTION INTRAVENOUS CONTINUOUS
Status: DISCONTINUED | OUTPATIENT
Start: 2025-07-10 | End: 2025-07-10 | Stop reason: HOSPADM

## 2025-07-10 RX ORDER — METOPROLOL SUCCINATE 50 MG/1
50 TABLET, EXTENDED RELEASE ORAL AT BEDTIME
Qty: 90 TABLET | Refills: 3 | Status: SHIPPED | OUTPATIENT
Start: 2025-07-10

## 2025-07-10 RX ORDER — SODIUM CHLORIDE 9 MG/ML
INJECTION, SOLUTION INTRAVENOUS CONTINUOUS PRN
Status: DISCONTINUED | OUTPATIENT
Start: 2025-07-10 | End: 2025-07-10

## 2025-07-10 RX ORDER — METOPROLOL SUCCINATE 50 MG/1
50 TABLET, EXTENDED RELEASE ORAL AT BEDTIME
Qty: 90 TABLET | Refills: 3 | Status: SHIPPED | OUTPATIENT
Start: 2025-07-10 | End: 2025-07-10

## 2025-07-10 RX ORDER — LIDOCAINE 40 MG/G
CREAM TOPICAL
Status: DISCONTINUED | OUTPATIENT
Start: 2025-07-10 | End: 2025-07-10 | Stop reason: HOSPADM

## 2025-07-10 RX ORDER — METOPROLOL TARTRATE 25 MG/1
25 TABLET, FILM COATED ORAL 2 TIMES DAILY
Qty: 60 TABLET | Refills: 0 | Status: SHIPPED | OUTPATIENT
Start: 2025-07-10 | End: 2025-07-10

## 2025-07-10 RX ADMIN — PROPOFOL 100 MCG/KG/MIN: 10 INJECTION, EMULSION INTRAVENOUS at 10:19

## 2025-07-10 RX ADMIN — PROPOFOL 50 MG: 10 INJECTION, EMULSION INTRAVENOUS at 10:20

## 2025-07-10 RX ADMIN — PROPOFOL 20 MG: 10 INJECTION, EMULSION INTRAVENOUS at 10:24

## 2025-07-10 RX ADMIN — METOPROLOL TARTRATE 25 MG: 25 TABLET, FILM COATED ORAL at 11:55

## 2025-07-10 RX ADMIN — PROPOFOL 50 MG: 10 INJECTION, EMULSION INTRAVENOUS at 10:22

## 2025-07-10 RX ADMIN — APIXABAN 5 MG: 5 TABLET, FILM COATED ORAL at 08:06

## 2025-07-10 RX ADMIN — PROPOFOL 20 MG: 10 INJECTION, EMULSION INTRAVENOUS at 10:33

## 2025-07-10 RX ADMIN — PHENYLEPHRINE HYDROCHLORIDE 100 MCG: 10 INJECTION INTRAVENOUS at 10:28

## 2025-07-10 RX ADMIN — SODIUM CHLORIDE, SODIUM LACTATE, POTASSIUM CHLORIDE, AND CALCIUM CHLORIDE: .6; .31; .03; .02 INJECTION, SOLUTION INTRAVENOUS at 09:12

## 2025-07-10 ASSESSMENT — ACTIVITIES OF DAILY LIVING (ADL)
ADLS_ACUITY_SCORE: 19
ADLS_ACUITY_SCORE: 23
ADLS_ACUITY_SCORE: 23
ADLS_ACUITY_SCORE: 19
ADLS_ACUITY_SCORE: 23
ADLS_ACUITY_SCORE: 23
ADLS_ACUITY_SCORE: 19
ADLS_ACUITY_SCORE: 23
ADLS_ACUITY_SCORE: 19
ADLS_ACUITY_SCORE: 19

## 2025-07-10 ASSESSMENT — ENCOUNTER SYMPTOMS: DYSRHYTHMIAS: 1

## 2025-07-10 NOTE — ANESTHESIA CARE TRANSFER NOTE
Patient: Matthew Platt    Procedure: Procedure(s):  ECHOCARDIOGRAM, TRANSESOPHAGEAL, WITH ANESTHESIA  ANESTHESIA CARDIOVERSION       Diagnosis: Atrial fibrillation with RVR (H) [I48.91]  New onset atrial fibrillation (H) [I48.91]  Diagnosis Additional Information: No value filed.    Anesthesia Type:   MAC     Note:    Oropharynx: oropharynx clear of all foreign objects and spontaneously breathing  Level of Consciousness: awake  Oxygen Supplementation: room air    Independent Airway: airway patency satisfactory and stable  Dentition: dentition unchanged  Vital Signs Stable: post-procedure vital signs reviewed and stable  Report to RN Given: handoff report given  Patient transferred to: Medical/Surgical Unit  Comments: Pt awake and taken back to unit from PAR  Handoff Report: Identifed the Patient, Identified the Reponsible Provider, Reviewed the pertinent medical history, Discussed the surgical course, Reviewed Intra-OP anesthesia mangement and issues during anesthesia, Set expectations for post-procedure period and Allowed opportunity for questions and acknowledgement of understanding  Vitals:  Vitals Value Taken Time   BP     Temp     Pulse     Resp     SpO2         Electronically Signed By: SHANITA Paz CRNA  July 10, 2025  10:51 AM

## 2025-07-10 NOTE — ANESTHESIA PREPROCEDURE EVALUATION
Anesthesia Pre-Procedure Evaluation    Patient: Matthew Platt   MRN: [id number] : 1945          Procedure : Procedure(s):  ECHOCARDIOGRAM, TRANSESOPHAGEAL, WITH ANESTHESIA  ANESTHESIA CARDIOVERSION         Past Medical History:   Diagnosis Date    A-fib (H)     BBB (bundle branch block)     Left    Cardiomyopathy (H)     Chronic sinusitis     Hoarseness 2018    HTN (hypertension)     Obstructive sleep apnea     Sleep apnea 2017    Tinnitus       Past Surgical History:   Procedure Laterality Date    CATARACT EXTRACTION, BILATERAL Bilateral     ENT SURGERY      OTHER SURGICAL HISTORY Bilateral     facial plastic surgery    PE TUBES      RELEASE DUPUYTRENS CONTRACTURE Left     4th finger    TONSILLECTOMY      TONSILLECTOMY      VASECTOMY        No Known Allergies   Social History     Tobacco Use    Smoking status: Former     Current packs/day: 0.00     Average packs/day: 1 pack/day for 7.3 years (7.3 ttl pk-yrs)     Types: Cigarettes, Cigars, Pipe     Start date: 1966     Quit date: 1974     Years since quittin.5     Passive exposure: Past    Smokeless tobacco: Never   Substance Use Topics    Alcohol use: Yes     Alcohol/week: 7.0 standard drinks of alcohol     Types: 7 Glasses of wine per week      Wt Readings from Last 1 Encounters:   25 86 kg (189 lb 8 oz)        Anesthesia Evaluation        History of anesthetic complications       ROS/MED HX  ENT/Pulmonary:     (+) sleep apnea,                                       Neurologic:       Cardiovascular:     (+)  hypertension- -   -  - -             fainting (syncope).           dysrhythmias, a-fib,             METS/Exercise Tolerance:     Hematologic:       Musculoskeletal:       GI/Hepatic:       Renal/Genitourinary:       Endo:       Psychiatric/Substance Use:       Infectious Disease:       Malignancy:       Other:              Physical Exam  Airway  Mallampati: I  TM distance: >3 FB  Mouth opening: >= 4  "cm    Cardiovascular   Rhythm: irregular     Dental     Pulmonary Breath sounds clear to auscultation        Neurological   He appears awake.    Other Findings       OUTSIDE LABS:  CBC:   Lab Results   Component Value Date    WBC 7.0 07/10/2025    WBC 7.6 07/09/2025    HGB 13.5 07/10/2025    HGB 12.3 (L) 07/09/2025    HCT 41.1 07/10/2025    HCT 37.1 (L) 07/09/2025     07/10/2025     07/09/2025     BMP:   Lab Results   Component Value Date     07/10/2025     07/09/2025    POTASSIUM 4.7 07/10/2025    POTASSIUM 4.8 07/09/2025    CHLORIDE 104 07/10/2025    CHLORIDE 104 07/09/2025    CO2 22 07/10/2025    CO2 26 07/09/2025    BUN 16.4 07/10/2025    BUN 17.9 07/09/2025    CR 0.96 07/10/2025    CR 1.00 07/09/2025    GLC 90 07/10/2025    GLC 94 07/09/2025     COAGS: No results found for: \"PTT\", \"INR\", \"FIBR\"  POC: No results found for: \"BGM\", \"HCG\", \"HCGS\"  HEPATIC:   Lab Results   Component Value Date    ALBUMIN 4.3 12/07/2023    PROTTOTAL 7.8 12/07/2023    ALT 20 12/07/2023    AST 28 12/07/2023    ALKPHOS 70 12/07/2023    BILITOTAL 1.0 12/07/2023     OTHER:   Lab Results   Component Value Date    EFFIE 9.3 07/10/2025    MAG 1.9 07/08/2025    TSH 1.92 07/08/2025    CRP 16.7 (H) 03/04/2019    SED 63 (H) 11/12/2021       Anesthesia Plan    ASA Status:  3       Anesthesia Type: MAC.  Induction: intravenous.  Maintenance: TIVA.   Techniques and Equipment:       - Monitoring Plan: standard ASA monitoring     Consents            - Pt is DNR/DNI Status: no DNR          Postoperative Care         Comments:                   Cale Myers MD    I have reviewed the pertinent notes and labs in the chart from the past 30 days and (re)examined the patient.  Any updates or changes from those notes are reflected in this note.    Clinically Significant Risk Factors Present on Admission                   # Hypertension: Noted on problem list           # Overweight: Estimated body mass index is 25.7 kg/m  as calculated " from the following:    Height as of this encounter: 1.829 m (6').    Weight as of this encounter: 86 kg (189 lb 8 oz).       # Financial/Environmental Concerns: none

## 2025-07-10 NOTE — DISCHARGE SUMMARY
[facility]  Hospitalist Discharge Summary      Date of Admission:  7/8/2025  Date of Discharge:  7/10/2025  Discharging Provider: Abi aGrvin NP  Discharge Service: Hospitalist Service    Discharge Diagnoses   New onset A-fib with RVR s/p ALEXIS/cardioversion    Clinically Significant Risk Factors     # Overweight: Estimated body mass index is 25.7 kg/m  as calculated from the following:    Height as of this encounter: 1.829 m (6').    Weight as of this encounter: 86 kg (189 lb 8 oz).       Follow-ups Needed After Discharge   Follow-up Appointments       Hospital Follow-up with Existing Primary Care Provider (PCP)          Schedule Primary Care visit within: 7 Days               Unresulted Labs Ordered in the Past 30 Days of this Admission       Date and Time Order Name Status Description    6/19/2025 10:06 PM FUNGAL OR YEAST CULTURE ROUTINE Preliminary             Discharge Disposition   Discharged to home  Condition at discharge: Stable    Hospital Course   Matthew Platt is a 79 year old male with PMHx of HTN, chronic sinusitis, KENNEDY who was admitted on 7/8/2025. He presented to the ER after feeling generally unwell, found to have A-fib with RVR which is a new diagnosis for him.  Ventricular rate improved s/p diltiazem and metoprolol in ER. Cardiology consulted, started on metoprolol and Eliquis. ALEXIS/Cardioversion performed and patient converted back to NSR with LBBB    Atrial Fibrillation with RVR   This is a NEW diagnosis for patient. HR 110s in the ED ; s/p 10 mg diltiazem in the ED   -- EKG A-fib with RVR with LBBB ; on chart review, has some previous documentation of LBBB from 2022  -- Trop 31 --> 22   -- PRN 5 mg IV metoprolol for HR > 100 q 6 hours   -- Echo- The left ventricle is normal in size. Image quality does not provide for detailed assessment of LV systolic function, but is felt to be moderately decreased with a visually estimated ejection fraction of roughly 40%.  There is moderate global reduction in left ventricular systolic performance. There is abnormal septal motion likely related to altered electrical activation due to bundle branch block. There is mild to moderate concentric increase in left ventricular wall thickness. No significant valvular heart disease is identified on this study. On selected views, the right ventricle appears mildly enlarged with mild-moderately reduced systolic performance (the right ventricle is poorly visualized on the study). There is mild to moderate left atrial enlargement  -- Cardiology consult-Had been started on heparin drip in ER. Cardiology changed to Eliquis BID. ALEXIS and cardioversion today 7/10. Successfully converted back to NSR with LBBB   -- Metoprolol 25 mg BID started per cardiology  -- CHADS score of at least 3 - pharm liaison for DOAC coverage - will defer to cards in the AM when to initiate   -- Continuous Telemonitoring   -- TSH normal   -- Chest CT-Moderate coronary artery calcification    Elevated BNP   BNP 2330 - does not appear significantly volume overloaded on exam - hold off on diuresis at this time  -- No signs of fluid congestion on CXR, CT chest ordered to further evaluate  -- Echo ordered as above  -- Cardiology consult as above  -- Daily weights   -- Cardiac monitoring     Generalized Malaise / Lightheadedness   Suspect secondary to the above +/- intolerance to recent Bactrim use  -- CXR showed streaky opacity involving the medial aspect of the left lung base representing either atelectasis/scarring versus infectious process   -- CT chest-Linear atelectasis in the lower lungs. Nothing definite for acute pneumonitis  -- CT head-No evidence for acute intracranial process  -- Negative Covid, flu, RSV  -- Procal normal   -- UA no infectious indicators  -- Orthostatics negative    Essential Hypertension   -- continue PTA lisinopril with hold parameters   -- close monitoring     Chronic Sinusitis   Was just seen by ENT  and had swab done for Klebsiella - he completed 7 day course of Bactrim before discontinuing (felt nauseated and developed a rash)  -- Continue outpatient follow-up with ENT     KENNEDY noncompliant with CPAP, states would not utilize here     Consultations This Hospital Stay   CARE MANAGEMENT / SOCIAL WORK IP CONSULT  PHARMACY LIAISON FOR MEDICATION COVERAGE CONSULT  CARDIOLOGY IP CONSULT  PHARMACY IP CONSULT  PHARMACY IP CONSULT  PHYSICAL THERAPY ADULT IP CONSULT    Code Status   No CPR- Do NOT Intubate    Time Spent on this Encounter   I, Abi Garvin NP, personally saw the patient today and spent greater than 30 minutes discharging this patient.       Abi Garvin NP  [facility] EXTENDED RECOVERY AND SHORT STAY  [address] [place] [id number]  Phone: [phone]  Fax: [phone]  ______________________________________________________________________    Physical Exam   Vital Signs: Temp: 98.3  F (36.8  C) Temp src: Oral BP: 122/60 Pulse: 63   Resp: 16 SpO2: 99 % O2 Device: None (Room air)    Weight: 189 lbs 8 oz  Constitutional: awake, alert, cooperative, no apparent distress, and appears stated age  Respiratory: No increased work of breathing, good air exchange, clear to auscultation bilaterally, no crackles or wheezing  Cardiovascular: Normal apical impulse, regular rate and rhythm, normal S1 and S2, no S3 or S4, and no murmur noted  GI: No scars, normal bowel sounds, soft, non-distended, non-tender, no masses palpated, no hepatosplenomegally       Primary Care Physician   Melia Torre    Discharge Orders      Follow-Up with Cardiology AJ      Primary Care Referral      Reason for your hospital stay    New A-fib with rapid ventricular rate s/p cardioversion     Activity    Your activity upon discharge: activity as tolerated     Cardiac Event Monitor Adult Pediatric    14 day zio     Diet    Follow this diet upon discharge: Current Diet:Orders Placed This  Encounter      Regular [facility] Adult     Hospital Follow-up with Existing Primary Care Provider (PCP)          Zio Patch Mail Out       Significant Results and Procedures   Results for orders placed or performed during the hospital encounter of 07/08/25   XR Chest Port 1 View    Narrative    EXAM: XR CHEST PORT 1 VIEW  LOCATION: [facility]  DATE: 7/8/2025    INDICATION: new a fib  COMPARISON: 9/11/2022      Impression    IMPRESSION: Heart and mediastinal size are normal. There is a small amount of streaky opacity involving the medial aspect of the left lung base representing either atelectasis/scarring versus infectious process. Right lung is clear. No pleural effusion   or pneumothorax.   CT Chest w/o Contrast    Narrative    EXAM: CT CHEST W/O CONTRAST  LOCATION: [facility]  DATE: 07/08/2025    INDICATION: Elevated BNP, abnormal finding on CXR.  COMPARISON: None.  TECHNIQUE: CT chest without IV contrast. Multiplanar reformats were obtained. Dose reduction techniques were used.  CONTRAST: None.    FINDINGS:   LUNGS AND PLEURA: Patchy areas of mostly linear atelectasis in both lower lobes. Nothing definite for acute pneumonitis. There is some slight mucus plugging in both lower lobes as well as a small amount of mucus/debris in the right mainstem bronchus. No   pleural effusions.    MEDIASTINUM/AXILLAE: No adenopathy. Normal heart size. No pericardial effusion. Normal-caliber thoracic aorta. Normal-variant aberrant right subclavian artery. Esophagus is grossly negative.    CORONARY ARTERY CALCIFICATION: Moderate.    UPPER ABDOMEN: Benign-appearing right renal cysts, partially visualized.    MUSCULOSKELETAL: Mild/moderate degenerative and hypertrophic changes of the thoracic spine.      Impression    IMPRESSION:   1.  Linear atelectasis in the lower lungs. Nothing definite for acute pneumonitis.    2.  Slight mucus plugging in the lower lobes as well as a small amount  of mucous/debris in the right mainstem bronchus.    3.  Normal heart size. No effusions.    4.  Moderate coronary artery calcification.   CT Head w/o Contrast    Narrative    EXAM: CT HEAD W/O CONTRAST  LOCATION: [facility]  DATE: 2025    INDICATION: lightheadedness, nausea  COMPARISON: None.  TECHNIQUE: Routine CT Head without IV contrast. Multiplanar reformats. Dose reduction techniques were used.    FINDINGS:  INTRACRANIAL CONTENTS: No intracranial hemorrhage, extraaxial collection, or mass effect.  No CT evidence of acute infarct. Mild presumed chronic small vessel ischemic changes. Mild generalized volume loss. No hydrocephalus.     VISUALIZED ORBITS/SINUSES/MASTOIDS: No intraorbital abnormality. Partial opacification right maxillary sinus. No middle ear or mastoid effusion.    BONES/SOFT TISSUES: No acute abnormality.      Impression    IMPRESSION:  1.  No CT evidence for acute intracranial process.  2.  Mild chronic microvascular ischemic changes as above.     Echocardiogram Complete    Narrative    [id number]  [id number]  [id number]  [id number]^IZABELA^MARIA DEL ROSARIO^     [place] [address]     Name: JESUS KHAN  MRN: [id number]  : 1945  Study Date: 2025 09:46 AM  Age: 79 yrs  Gender: Male  Patient [facility]  Reason For Study: Atrial Fibrillation  Ordering Physician: MARIA DEL ROSARIO GARCIA  Referring Physician: Maria Del Rosario garcia  Performed By: Yanni Madrigal     BSA: 2.1 m2  Height: 72 in  Weight: 189 lb  HR: 76  BP: 130/67 mmHg  ______________________________________________________________________________  Procedure  Echocardiogram with two-dimensional, color and spectral Doppler. Definity (NDC  #[id number]) given intravenously.  ______________________________________________________________________________  Interpretation Summary     1. Technically difficult study.  2. The left ventricle is normal in size. Image quality does not provide  for  detailed assessment of LV systolic function, but is felt to be moderately  decreased with a visually estimated ejection fraction of roughly 40%.  3. There is moderate global reduction in left ventricular systolic  performance.  4. There is abnormal septal motion likely related to altered electrical  activation due to bundle branch block.  5. There is mild to moderate concentric increase in left ventricular wall  thickness.  6. No significant valvular heart disease is identified on this study.  7. On selected views, the right ventricle appears mildly enlarged with mild-  moderately reduced systolic performance (the right ventricle is poorly  visualized on the study).  8. There is mild to moderate left atrial enlargement.  ______________________________________________________________________________  Left ventricle:  The left ventricle is normal in size. Image quality does not provide for  detailed assessment of LV systolic function, but is felt to be moderately  decreased with a visually estimated ejection fraction of roughly 40%. There is  moderate global reduction in left ventricular systolic performance. There is  abnormal septal motion likely related to altered electrical activation due to  bundle branch block. There is mild to moderate concentric increase in left  ventricular wall thickness.     Right ventricle:  On selected views, the right ventricle appears mildly enlarged with mild-  moderately reduced systolic performance (the right ventricle is poorly  visualized on the study).     Left atrium:  There is mild to moderate left atrial enlargement.     Right atrium:  Right atrium is not well-visualized.     IVC:  The IVC is borderline dilated.     Aortic valve:  The aortic valve is not well visualized, but suspected to be comprised of  three cusps. No significant aortic stenosis or aortic insufficiency is  detected on this study.     Mitral valve:  The mitral valve appears morphologically normal. There  is trace mitral  insufficiency.     Tricuspid valve:  The tricuspid valve is grossly morphologically normal. There is trace  tricuspid insufficiency.     Pulmonic valve:  The pulmonic valve is not well visualized.     Thoracic aorta:  The aortic root and proximal ascending aorta are of normal dimension.     Pericardium:  There is no significant pericardial effusion.  ______________________________________________________________________________  ______________________________________________________________________________  MMode/2D Measurements & Calculations  IVSd: 1.8 cm  LVIDd: 5.0 cm  LVIDs: 3.9 cm  LVPWd: 1.4 cm  FS: 21.6 %  LV mass(C)d: 337.0 grams  LV mass(C)dI: 162.0 grams/m2  Ao root diam: 3.5 cm  asc Aorta Diam: 3.4 cm  LVOT diam: 2.0 cm  LVOT area: 3.1 cm2  Ao root diam index Ht(cm/m): 1.9  Ao root diam index BSA (cm/m2): 1.7  Asc Ao diam index BSA (cm/m2): 1.6  Asc Ao diam index Ht(cm/m): 1.9  EF Biplane: 43.7 %  LA Volume (BP): 68.2 ml     LA Volume Index (BP): 32.8 ml/m2  LA Volume Indexed (AL/bp): 34.5 ml/m2  RWT: 0.55  TAPSE: 2.0 cm     Doppler Measurements & Calculations  MV E max jon: 96.0 cm/sec  MV dec slope: 619.0 cm/sec2  MV dec time: 0.16 sec  Ao V2 max: 120.3 cm/sec  Ao max P.0 mmHg  Ao V2 mean: 88.5 cm/sec  Ao mean P.0 mmHg  Ao V2 VTI: 23.5 cm  ANSHU(I,D): 2.0 cm2  ANSHU(V,D): 2.2 cm2  LV V1 max PG: 3.0 mmHg  LV V1 max: 85.9 cm/sec  LV V1 VTI: 14.7 cm  SV(LVOT): 46.1 ml  SI(LVOT): 22.2 ml/m2  PA V2 max: 68.5 cm/sec  PA max P.9 mmHg  PA acc time: 0.10 sec  AV Jon Ratio (DI): 0.71  ANSHU Index (cm2/m2): 0.94  E/E' av.1  Lateral E/e': 10.4  Medial E/e': 15.9  RV S Jon: 11.4 cm/sec     ______________________________________________________________________________  Report approved by: Vinayak Macdonald MD on 2025 11:18 AM         Echocardiogram ALEXIS     Value    LVEF  45-50%    Narrative    [id number]  [id number]  [id number]  [id number]^DONATO^NOEMY^     [facility]  [address]  [place] [address]     Name: JESUS KHAN  MRN: [id number]  : 1945  Study Date: 07/10/2025 09:56 AM  Age: 79 yrs  Gender: Male  Patient Location: [facility]  Reason For Study: Atrial Fibrillation  Ordering Physician: Keiry Cardona MD  Referring Physician: Keiry Cardona  Performed By: DL/LH^^^^     BSA: 2.1 m2  Height: 72 in  Weight: 189 lb  HR: 118  ______________________________________________________________________________  Procedure  Transesophageal Echocardiogram with two-dimensional, color and spectral  Doppler.  ______________________________________________________________________________  Interpretation Summary     The left ventricle is normal in size.  There is mild concentric left ventricular hypertrophy.  The visual ejection fraction is 45-50%.  Septal motion is consistent with conduction abnormality.  There is mild global hypokinesia of the left ventricle.  Normal right ventricle size and systolic function.  The left atrium is moderately dilated.  The right atrium is moderate to severely dilated.  Mild mitral valve regurgitation.  There is no atrial shunt seen.  There is no color Doppler evidence of a PFO.  A contrast injection (Bubble Study) was performed that was negative for flow  across the interatrial septum.  No thrombus is detected in the left atrial appendage.     ______________________________________________________________________________  I      WMSI = 2.00     % Normal = 0     X - Cannot   0 -                      (2) - Mildly 2 -          Segments  Size  Interpret    Hyperkinetic 1 - Normal  Hypokinetic  Hypokinetic  1-2     small                                                 7 -          3-5      moderate  3 - Akinetic 4 -          5 -         6 - Akinetic Dyskinetic   6-14    large           Dyskinetic   Aneurysmal  w/scar       w/scar       15-16   diffuse     ALEXIS  Consent to the procedure was obtained prior to sedation. Prior to the exam,  the oral  cavity was checked and no overcrowding was noted. The transesophageal  probe was passed without difficulty. There were no complications associated  with this procedure. Sedation, endotracheal intubation, and mechanical  ventilation were initiated prior to the ALEXIS and were monitored by anesthesia.  Sedation was administered and monitored by anesthesia.     Left Ventricle  The left ventricle is normal in size. There is mild concentric left  ventricular hypertrophy. The visual ejection fraction is 45-50%. There is mild  global hypokinesia of the left ventricle. Septal motion is consistent with  conduction abnormality.     Right Ventricle  Normal right ventricle size and systolic function.     Atria  The left atrium is moderately dilated. The right atrium is moderate to  severely dilated. There is no atrial shunt seen. There is no color Doppler  evidence of a PFO. A contrast injection (Bubble Study) was performed that was  negative for flow across the interatrial septum. No thrombus is detected in  the left atrial appendage.     Mitral Valve  Mitral valve leaflets appear normal. There is mild (1+) mitral regurgitation.  There is no mitral valve stenosis.     Tricuspid Valve  The tricuspid valve is normal in structure and function.     Aortic Valve  The aortic valve is normal in structure and function.     Pulmonic Valve  The pulmonic valve is not well seen, but is grossly normal.     Vessels  Normal size aorta.     Pericardial/Pleural  There is no pericardial effusion.     Rhythm  The rhythm was rapid atrial fibrillation.  ______________________________________________________________________________  Report approved by: Alisa Murcia MD on 07/10/2025 11:07 AM     ______________________________________________________________________________          Discharge Medications      Review of your medicines        START taking        Dose / Directions   apixaban ANTICOAGULANT 5 MG tablet  Commonly known as: ELIQUIS  Indication:  Atrial Fibrillation Not Caused By A Heart Valve Problem      Dose: 5 mg  Take 1 tablet (5 mg) by mouth 2 times daily.  Quantity: 60 tablet  Refills: 0     metoprolol succinate ER 50 MG 24 hr tablet  Commonly known as: TOPROL XL      Dose: 50 mg  Take 1 tablet (50 mg) by mouth at bedtime.  Quantity: 90 tablet  Refills: 3            CONTINUE these medicines which have NOT CHANGED        Dose / Directions   lisinopril 20 MG tablet  Commonly known as: ZESTRIL      Dose: 20 mg  Take 20 mg by mouth daily.  Refills: 0     PRESERVISION AREDS 2 PO      Dose: 1 tablet  Take 1 tablet by mouth daily.  Refills: 0               Where to get your medicines        These medications were sent to [id number] - [place], [place] - [address]  [address] [address]      Phone: [phone]   apixaban ANTICOAGULANT 5 MG tablet  metoprolol succinate ER 50 MG 24 hr tablet       Allergies   No Known Allergies

## 2025-07-10 NOTE — PROCEDURES
Procedures:  TT-guided cardioversion.  Anesthesia is performed by anesthesiologist.    Preprocedure diagnosis: Atrial fibrillation with rapid ventricular response, frequent PVCs.  Post procedure diagnosis: Successful cardioversion in sinus rhythm, atrial fibrillation with RVR s/p cardioversion, frequent PVCs    Procedure description: The patient had a OR ALEXIS prior to cardioversion.  Electrical pad placed anterior and posteriorly in standard pathway prior to ALEXIS.  After ALEXIS was performed, no thrombus is identified in left atrial appendage and the left atrium.  The patient is connected to the device.  ECG synchronization with R wave.  200 J was delivered.  The patient became sinus rhythm of with frequent PVC, however, sinus rhythm only maintained in 10 seconds.  And then 300 j was delivered with R wave synchronization.  The patient's atrial fibrillation was converted to sinus rhythm with ventricular rate of 6270 bpm.  ECG strip was printed out and kept in his chart.  The patient woke up without neurologic deficits.    The information is sent to EP service Ruddy.  The patient will be transferred to [facility] and continue care in [facility] by EP service until the patient is discharged.

## 2025-07-10 NOTE — PLAN OF CARE
Problem: Dysrhythmia  Goal: Normalized Cardiac Rhythm  7/10/2025 1134 by Michelle Gutiérrez, RN  Outcome: Progressing  7/10/2025 0918 by Michelle Gutiérrez, RN  Outcome: Progressing  Intervention: Monitor and Manage Cardiac Rhythm Effect  Recent Flowsheet Documentation  Taken 7/10/2025 0805 by Michelle Gutiérrez, RN  VTE Prevention/Management: (frequent ambulation) other (see comments)         Goal Outcome Evaluation:    A&Ox4, VSS on RA. Post cardioversion and ALEXIS. Tele: NSR. Denies pain, SOB. Regular diet. Independent.     Metoprolol given x1.     Zio patch placed inpatient.

## 2025-07-10 NOTE — ANESTHESIA POSTPROCEDURE EVALUATION
Patient: Matthew Platt    Procedure: Procedure(s):  ECHOCARDIOGRAM, TRANSESOPHAGEAL, WITH ANESTHESIA  ANESTHESIA CARDIOVERSION       Anesthesia Type:  MAC    Note:  Disposition: Outpatient   Postop Pain Control: Uneventful            Sign Out: Well controlled pain   PONV: No   Neuro/Psych: Uneventful            Sign Out: Acceptable/Baseline neuro status   Airway/Respiratory: Uneventful            Sign Out: Acceptable/Baseline resp. status   CV/Hemodynamics: Uneventful            Sign Out: Acceptable CV status; No obvious hypovolemia; No obvious fluid overload   Other NRE: NONE   DID A NON-ROUTINE EVENT OCCUR? No           Last vitals:  Vitals:    07/10/25 1103 07/10/25 1155 07/10/25 1213   BP: 107/54 112/59 122/60   Pulse: 66 66 63   Resp: 16  16   Temp: 36.3  C (97.3  F)  36.8  C (98.3  F)   SpO2: 100%  99%       Electronically Signed By: Cale Myers MD  July 10, 2025  1:12 PM

## 2025-07-10 NOTE — PROGRESS NOTES
Pt sleeping had Vtach 7beats, VSS in RA. Denies pain / discomfort when the event happened.  Hospitalist was informed.     Radha Hanley RN

## 2025-07-10 NOTE — PLAN OF CARE
Problem: Dysrhythmia  Goal: Normalized Cardiac Rhythm  Intervention: Monitor and Manage Cardiac Rhythm Effect  Recent Flowsheet Documentation  Taken 7/10/2025 0515 by Radha Hanley RN  VTE Prevention/Management: (ambulates steady/ind)   SCDs off (sequential compression devices)   other (see comments)  Taken 2025 by Radha Hanley RN  VTE Prevention/Management: (ambulates steady/ind)   SCDs off (sequential compression devices)   other (see comments)   Goal Outcome Evaluation:      Plan of Care Reviewed With: patient    Overall Patient Progress: no changeOverall Patient Progress: no change    Pt admitted due to new onset afib w/ RVR. VSS in RA.  Tele - Afib w/ BBB. Denies pain, SOB/. Ambulates ind to bathroom.  Npo since midnight. For ALEXIS & Cardiovesion ths AM.     Radha Hanley RN

## 2025-07-10 NOTE — PLAN OF CARE
Patient discharged to home at 1440 via Private Car.  Accompanied by spouse and staff.  Discharge instructions were reviewed with patient and spouse, opportunity offered to ask questions, questions answered.    Prescriptions faxed to pharmacy.  Access discontinued: Yes  Care plan and education discontinued: Yes  Home meds retrieved from pharmacy: N/A  Belongings were sent home with patient/family:  clothes, phone, ipad, glasses.

## 2025-07-10 NOTE — PROGRESS NOTES
"PRIMARY DIAGNOSIS: \"GENERIC\" NURSING  OUTPATIENT/OBSERVATION GOALS TO BE MET BEFORE DISCHARGE:  ADLs back to baseline: Yes    Activity and level of assistance: Ambulating independently.    Pain status: Pain free.    Return to near baseline physical activity: Yes     Discharge Planner Nurse   Safe discharge environment identified: No  Barriers to discharge: Yes       Entered by: Radha Hanley RN 07/10/2025 12:46 AM     Please review provider order for any additional goals.   Nurse to notify provider when observation goals have been met and patient is ready for discharge.  "

## 2025-07-10 NOTE — PLAN OF CARE
"PRIMARY DIAGNOSIS: \"GENERIC\" NURSING  OUTPATIENT/OBSERVATION GOALS TO BE MET BEFORE DISCHARGE:  ADLs back to baseline: Yes    Activity and level of assistance: Ambulating independently.    Pain status: Pain free.    Return to near baseline physical activity: Yes     Discharge Planner Nurse     Pain free, held BP meds this AM for soft pressures. Ambulating independently in room. Pt transferred off unit @ 0820 for ALEXIS and cardioversion procedure. Wife present @ bedside.      Please review provider order for any additional goals.   Nurse to notify provider when observation goals have been met and patient is ready for discharge.  "

## 2025-07-10 NOTE — OR NURSING
Dr. Murcia defibrillated the patient to 200 Joules   At 10:33 am. Defibrillated to 300 Joules at 10:35 am.

## 2025-07-10 NOTE — PROGRESS NOTES
Cross cover:    Nurse reported 7 beats of VT without symptoms.  Chart reviewed.   Continue to monitor on tele.

## 2025-07-10 NOTE — PRE-PROCEDURE
GENERAL PRE-PROCEDURE:   Procedure:  ALEXIS  Date/Time:  7/10/2025 10:00 AM    Written consent obtained?: Yes    Risks and benefits: Risks, benefits and alternatives were discussed    DC Plan: Appropriate discharge home plan in place for patients who are going home after procedure   Consent given by:  Patient  Patient states understanding of procedure being performed: Yes    Patient's understanding of procedure matches consent: Yes    Procedure consent matches procedure scheduled: Yes    Expected level of sedation:  Moderate  Appropriately NPO:  Yes  Mallampati  :  Grade 2- soft palate, base of uvula, tonsillar pillars, and portion of posterior pharyngeal wall visible  Lungs:  Lungs clear with good breath sounds bilaterally  Heart:  Normal heart sounds and rate  History & Physical reviewed:  History and physical reviewed and no updates needed  Statement of review:  I have reviewed the lab findings, diagnostic data, medications, and the plan for sedation

## 2025-07-10 NOTE — PROGRESS NOTES
[facility]  Cardiology Progress Note  Date of Service: 07/10/2025  Primary Cardiologist: Dr Cardona    Assessment & Plan    Matthew Platt is a 79 year old male with past medical history significant for  HTN, KENNEDY admitted on 7/8/2025 with feeling fatigued  10 days after an illness, found to have AF with RVR.     Assessment and Plan:  Atrial fibrillation: New onset atrial fibrillation for unknown duration.  Symptoms have been ongoing for about 1 to 2 weeks now.  Main symptoms are of increased fatigue/generalized weakness.  Recommend rhythm control.  We discussed DCCV as outpatient after 3 weeks of anticoagulation versus more urgent ALEXIS/DCCV during this admission.  Given ongoing symptoms as well as decline in left ventricular ejection fraction ALEXIS/DCCV preformed today, brief run of tachycardia after. Eliquis initiated this admission   Cardiomyopathy: Left ventricular ejection fraction declined to 40%.  This is in the setting of tachycardia and left bundle branch block.  Recommend reevaluation of left ventricular ejection fraction a few months after sinus rhythm restored.  Hypertension: Well-controlled  KENNEDY on CPAP     Plan:  14-day ZIO monitor upon discharge  Eliquis 5 mg twice daily  Discharge on Toprol XL 50 mg daily, starting this evening   Continue lisinopril  CPAP compliance is essential   Patient needs referral to establish PCP     Disposition: Okay to discharge from cardiovascular standpoint this afternoon.  Patient will follow-up in cardiology clinic in 4 weeks.    30 minutes spent by me on the date of service doing chart review, history, exam, documentation, coordination and discussion with other care providers & further activities per the note. MD time separate.   Jaclyn You PA-C  [facility]  Pager: through Vocera    Interval History   No acute events overnight.  Underwent cardioversion today without issue. Patient denies chest pain, pressure and tightness. No shortness of  breath or dyspnea on exertion. No dizziness, lightheadedness, pre-syncope or syncope. No palpitations or racing heart.       Data   Last 24 hours diagnostics reviewed:  Vital signs:  Temp: 97.3  F (36.3  C) Temp src: Oral BP: 112/59 Pulse: 66   Resp: 16 SpO2: 100 % O2 Device: None (Room air)   Height: 182.9 cm (6') Weight: 86 kg (189 lb 8 oz)  Estimated body mass index is 25.7 kg/m  as calculated from the following:    Height as of this encounter: 1.829 m (6').    Weight as of this encounter: 86 kg (189 lb 8 oz).    Last Comprehensive Metabolic Panel:  Lab Results   Component Value Date     07/10/2025    POTASSIUM 4.7 07/10/2025    CHLORIDE 104 07/10/2025    CO2 22 07/10/2025    ANIONGAP 10 07/10/2025    GLC 90 07/10/2025    BUN 16.4 07/10/2025    CR 0.96 07/10/2025    GFRESTIMATED 80 07/10/2025    EFFIE 9.3 07/10/2025       TSH   Date Value Ref Range Status   07/08/2025 1.92 0.30 - 4.20 uIU/mL Final   11/12/2021 2.35 0.30 - 5.00 uIU/mL Final   03/04/2019 2.32 0.40 - 4.00 mU/L Final     7/10/2025 ALEXIS:     The left ventricle is normal in size.  There is mild concentric left ventricular hypertrophy.  The visual ejection fraction is 45-50%.  Septal motion is consistent with conduction abnormality.  There is mild global hypokinesia of the left ventricle.  Normal right ventricle size and systolic function.  The left atrium is moderately dilated.  The right atrium is moderate to severely dilated.  Mild mitral valve regurgitation.  There is no atrial shunt seen.  There is no color Doppler evidence of a PFO.  A contrast injection (Bubble Study) was performed that was negative for flow  across the interatrial septum.  No thrombus is detected in the left atrial appendage.    7/9/2025 TTE:  1. Technically difficult study.  2. The left ventricle is normal in size. Image quality does not provide for  detailed assessment of LV systolic function, but is felt to be moderately  decreased with a visually estimated ejection  fraction of roughly 40%.  3. There is moderate global reduction in left ventricular systolic  performance.  4. There is abnormal septal motion likely related to altered electrical  activation due to bundle branch block.  5. There is mild to moderate concentric increase in left ventricular wall  thickness.  6. No significant valvular heart disease is identified on this study.  7. On selected views, the right ventricle appears mildly enlarged with mild-  moderately reduced systolic performance (the right ventricle is poorly  visualized on the study).  8. There is mild to moderate left atrial enlargement.      Telemetry:   SR        Physical Exam   Temp: 97.3  F (36.3  C) Temp src: Oral BP: 108/74 Pulse: (!) 124   Resp: 16 SpO2: 100 % O2 Device: None (Room air)    Vitals:    07/08/25 1400 07/08/25 2020 07/09/25 0840   Weight: 86.2 kg (190 lb) 87.5 kg (192 lb 14.4 oz) 86 kg (189 lb 8 oz)     GEN: awake, alert and oriented  HEART: RRR, no significant murmur  LUNGS: breathing non labored, lungs CTAB  EXT: no significant swelling      Medications   Current Facility-Administered Medications   Medication Dose Route Frequency Provider Last Rate Last Admin     Current Facility-Administered Medications   Medication Dose Route Frequency Provider Last Rate Last Admin    apixaban ANTICOAGULANT (ELIQUIS) tablet 5 mg  5 mg Oral BID Keiry Cardona MD   5 mg at 07/10/25 0806    lisinopril (ZESTRIL) tablet 20 mg  20 mg Oral Daily Abi Herrera NP        metoprolol tartrate (LOPRESSOR) tablet 25 mg  25 mg Oral BID Abi Herrera NP   25 mg at 07/09/25 2033

## 2025-07-11 LAB
ATRIAL RATE - MUSE: 136 BPM
DIASTOLIC BLOOD PRESSURE - MUSE: NORMAL MMHG
INTERPRETATION ECG - MUSE: NORMAL
P AXIS - MUSE: NORMAL DEGREES
PR INTERVAL - MUSE: NORMAL MS
QRS DURATION - MUSE: 138 MS
QT - MUSE: 314 MS
QTC - MUSE: 434 MS
R AXIS - MUSE: -56 DEGREES
SYSTOLIC BLOOD PRESSURE - MUSE: NORMAL MMHG
T AXIS - MUSE: 112 DEGREES
VENTRICULAR RATE- MUSE: 115 BPM

## 2025-07-17 LAB — BACTERIA SPEC CULT: NO GROWTH

## 2025-07-21 ENCOUNTER — OFFICE VISIT (OUTPATIENT)
Dept: FAMILY MEDICINE | Facility: CLINIC | Age: 80
End: 2025-07-21
Payer: COMMERCIAL

## 2025-07-21 VITALS
BODY MASS INDEX: 26.99 KG/M2 | HEART RATE: 68 BPM | HEIGHT: 71 IN | WEIGHT: 192.8 LBS | DIASTOLIC BLOOD PRESSURE: 59 MMHG | RESPIRATION RATE: 18 BRPM | SYSTOLIC BLOOD PRESSURE: 131 MMHG | OXYGEN SATURATION: 100 % | TEMPERATURE: 97.8 F

## 2025-07-21 DIAGNOSIS — Z88.3 ALLERGY TO ANTIBACTERIAL DRUG: ICD-10-CM

## 2025-07-21 DIAGNOSIS — I48.91 NEW ONSET ATRIAL FIBRILLATION (H): ICD-10-CM

## 2025-07-21 DIAGNOSIS — I25.10 CORONARY ARTERY CALCIFICATION SEEN ON CAT SCAN: ICD-10-CM

## 2025-07-21 DIAGNOSIS — Z09 HOSPITAL DISCHARGE FOLLOW-UP: Primary | ICD-10-CM

## 2025-07-21 DIAGNOSIS — E78.2 MIXED HYPERLIPIDEMIA: ICD-10-CM

## 2025-07-21 PROCEDURE — 1126F AMNT PAIN NOTED NONE PRSNT: CPT | Performed by: NURSE PRACTITIONER

## 2025-07-21 PROCEDURE — 1111F DSCHRG MED/CURRENT MED MERGE: CPT | Performed by: NURSE PRACTITIONER

## 2025-07-21 PROCEDURE — 3078F DIAST BP <80 MM HG: CPT | Performed by: NURSE PRACTITIONER

## 2025-07-21 PROCEDURE — 3075F SYST BP GE 130 - 139MM HG: CPT | Performed by: NURSE PRACTITIONER

## 2025-07-21 PROCEDURE — 99214 OFFICE O/P EST MOD 30 MIN: CPT | Performed by: NURSE PRACTITIONER

## 2025-07-21 ASSESSMENT — PAIN SCALES - GENERAL: PAINLEVEL_OUTOF10: NO PAIN (0)

## 2025-07-21 NOTE — PROGRESS NOTES
"  Assessment & Plan     Hospital discharge follow-up   Improving.     New onset atrial fibrillation (H)  Stable. Has follow-up with cardiology in September. Refill of Eliquis sent to get him through until then. Advised he also continue Metoprolol. He has enough to get him through of the Metoprolol.     - apixaban ANTICOAGULANT (ELIQUIS) 5 MG tablet; Take 1 tablet (5 mg) by mouth 2 times daily.    Allergy to antibacterial drug  Developed a rash and fatigue after starting Bactrim a few weeks ago. Advised he not take it again and listed it as an allergy.     Mixed hyperlipidemia  Hx of HLD and on recent CT scan they noted moderate coronary calcifications. Discussed statin, he declines at this time given hx of muscle aches with statins. Discussed risks. He is aware at this time.     Coronary artery calcification seen on CAT scan  See above.       MED REC REQUIRED  Post Medication Reconciliation Status:  Discharge medications reconciled, continue medications without change  BMI  Estimated body mass index is 27.27 kg/m  as calculated from the following:    Height as of this encounter: 1.791 m (5' 10.5\").    Weight as of this encounter: 87.5 kg (192 lb 12.8 oz).       Follow-up   No follow-ups on file.        Kelly Rhodes is a 79 year old, presenting for the following health issues:  Hospital F/U and unsure if mediation allergy (Unsure if symptoms and what happened are related to the antibiotic he was on )        7/21/2025    12:59 PM   Additional Questions   Roomed by Karolyn   Accompanied by none         7/21/2025    12:59 PM   Patient Reported Additional Medications   Patient reports taking the following new medications none     HPI          Hospital Follow-up Visit:    Hospital/Nursing Home/IP Rehab Facility: [facility]  Most Recent Admission Date: 7/8/2025   Most Recent Admission Diagnosis: Dizziness - R42  Weakness generalized - R53.1  New onset atrial fibrillation (H) - I48.91  Atrial " fibrillation with RVR (H) - I48.91     Most Recent Discharge Date: 7/10/2025   Most Recent Discharge Diagnosis: Atrial fibrillation with RVR (H) - I48.91  New onset atrial fibrillation (H) - I48.91  Dizziness - R42  Weakness generalized - R53.1  Benign essential hypertension - I10  History of high blood pressure - Z86.79  Recurrent sinus infections - J32.9  Non-ST elevation (NSTEMI) myocardial infarction (H) - I21.4  Mixed hyperlipidemia - E78.2  History of chronic cough - Z87.898  Chronic maxillary sinusitis - J32.0   Do you have any other stressors you would like to discuss with your provider? No    Problems taking medications regularly:  None  Medication changes since discharge: Eliquis, Metoprolol  Problems adhering to non-medication therapy:  None    Summary of hospitalization:  [facility] discharge summary reviewed  Diagnostic Tests/Treatments reviewed.  Follow up needed: none  Other Healthcare Providers Involved in Patient s Care:         Specialist appointment - Cardiology in September  Update since discharge: improved.         Plan of care communicated with patient           [facility]  Hospitalist Discharge Summary       Date of Admission:  7/8/2025  Date of Discharge:  7/10/2025  Discharging Provider: Abi Garvin NP  Discharge Service: Hospitalist Service     Discharge Diagnoses  New onset A-fib with RVR s/p ALEXIS/cardioversion     Clinically Significant Risk Factors     # Overweight: Estimated body mass index is 25.7 kg/m  as calculated from the following:    Height as of this encounter: 1.829 m (6').    Weight as of this encounter: 86 kg (189 lb 8 oz).       Follow-ups Needed After Discharge  Follow-up Appointments         Hospital Follow-up with Existing Primary Care Provider (PCP)           Schedule Primary Care visit within: 7 Days                    Unresulted Labs Ordered in the Past 30 Days of this Admission         Date and Time Order Name Status  Description     6/19/2025 10:06 PM FUNGAL OR YEAST CULTURE ROUTINE Preliminary                  Discharge Disposition  Discharged to home  Condition at discharge: Stable     Hospital Course  Matthew Platt is a 79 year old male with PMHx of HTN, chronic sinusitis, KENNEDY who was admitted on 7/8/2025. He presented to the ER after feeling generally unwell, found to have A-fib with RVR which is a new diagnosis for him.  Ventricular rate improved s/p diltiazem and metoprolol in ER. Cardiology consulted, started on metoprolol and Eliquis. ALEXIS/Cardioversion performed and patient converted back to NSR with LBBB     Atrial Fibrillation with RVR   This is a NEW diagnosis for patient. HR 110s in the ED ; s/p 10 mg diltiazem in the ED   -- EKG A-fib with RVR with LBBB ; on chart review, has some previous documentation of LBBB from 2022  -- Trop 31 --> 22   -- PRN 5 mg IV metoprolol for HR > 100 q 6 hours   -- Echo- The left ventricle is normal in size. Image quality does not provide for detailed assessment of LV systolic function, but is felt to be moderately decreased with a visually estimated ejection fraction of roughly 40%. There is moderate global reduction in left ventricular systolic performance. There is abnormal septal motion likely related to altered electrical activation due to bundle branch block. There is mild to moderate concentric increase in left ventricular wall thickness. No significant valvular heart disease is identified on this study. On selected views, the right ventricle appears mildly enlarged with mild-moderately reduced systolic performance (the right ventricle is poorly visualized on the study). There is mild to moderate left atrial enlargement  -- Cardiology consult-Had been started on heparin drip in ER. Cardiology changed to Eliquis BID. ALEXIS and cardioversion today 7/10. Successfully converted back to NSR with LBBB   -- Metoprolol 25 mg BID started per cardiology  -- CHADS score of at least 3 -  "pharm liaison for DOAC coverage - will defer to cards in the AM when to initiate   -- Continuous Telemonitoring   -- TSH normal   -- Chest CT-Moderate coronary artery calcification     Elevated BNP   BNP 2330 - does not appear significantly volume overloaded on exam - hold off on diuresis at this time  -- No signs of fluid congestion on CXR, CT chest ordered to further evaluate  -- Echo ordered as above  -- Cardiology consult as above  -- Daily weights   -- Cardiac monitoring      Generalized Malaise / Lightheadedness   Suspect secondary to the above +/- intolerance to recent Bactrim use  -- CXR showed streaky opacity involving the medial aspect of the left lung base representing either atelectasis/scarring versus infectious process   -- CT chest-Linear atelectasis in the lower lungs. Nothing definite for acute pneumonitis  -- CT head-No evidence for acute intracranial process  -- Negative Covid, flu, RSV  -- Procal normal   -- UA no infectious indicators  -- Orthostatics negative     Essential Hypertension   -- continue PTA lisinopril with hold parameters   -- close monitoring      Chronic Sinusitis   Was just seen by ENT and had swab done for Klebsiella - he completed 7 day course of Bactrim before discontinuing (felt nauseated and developed a rash)  -- Continue outpatient follow-up with ENT      KENNEDY noncompliant with CPAP, states would not utilize here     ----------------------------------------------  Additional provider notes: Patient presents for the following:     Hospital follow-up: was taking Bactrim after Klebsiella infection. After 3 days he developed rash, nausea, malaise. He continued it until day 7 and was told by ENT to stop. 1 week later his blood pressure was \"all over the place\" and he felt \"terrible\". He went to  and they found A-fib. He was then admitted to ER. States he is feeling better, but not 100%. He played a 3 day golf tournament over the weekend.   -is wearing ziopatch for another " "week.   -has follow-up with cardiology in September.     Coronary calcium build up: noted on CT scan.     HLD: had muscle aches with statin in the past.     No Known Allergies    Current Outpatient Medications   Medication Sig Dispense Refill    apixaban ANTICOAGULANT (ELIQUIS) 5 MG tablet Take 1 tablet (5 mg) by mouth 2 times daily. 60 tablet 0    lisinopril (ZESTRIL) 20 MG tablet Take 20 mg by mouth daily.      metoprolol succinate ER (TOPROL XL) 50 MG 24 hr tablet Take 1 tablet (50 mg) by mouth at bedtime. 90 tablet 3    Multiple Vitamins-Minerals (PRESERVISION AREDS 2 PO) Take 1 tablet by mouth daily.       No current facility-administered medications for this visit.       Past Medical History:   Diagnosis Date    A-fib (H)     BBB (bundle branch block)     Left    Cardiomyopathy (H)     Chronic sinusitis     Hoarseness 09/01/2018    HTN (hypertension)     Obstructive sleep apnea     resolved    Sleep apnea 09/01/2017    Tinnitus             Review of Systems  Constitutional, HEENT, cardiovascular, pulmonary, gi and gu systems are negative, except as otherwise noted.      Objective    /59 (BP Location: Right arm, Patient Position: Sitting, Cuff Size: Adult Large)   Pulse 68   Temp 97.8  F (36.6  C) (Oral)   Resp 18   Ht 1.791 m (5' 10.5\")   Wt 87.5 kg (192 lb 12.8 oz)   SpO2 100%   BMI 27.27 kg/m    Body mass index is 27.27 kg/m .  Physical Exam  Vitals reviewed.   Constitutional:       General: He is not in acute distress.     Appearance: Normal appearance. He is not ill-appearing or toxic-appearing.   Cardiovascular:      Rate and Rhythm: Normal rate and regular rhythm.      Pulses: Normal pulses.      Heart sounds: Normal heart sounds.   Pulmonary:      Effort: Pulmonary effort is normal.      Breath sounds: Normal breath sounds.   Musculoskeletal:         General: Normal range of motion.   Skin:     General: Skin is warm and dry.   Neurological:      Mental Status: He is alert and oriented to " person, place, and time.   Psychiatric:         Behavior: Behavior normal.                    Signed Electronically by: Melia Torre DNP

## 2025-07-31 LAB — CV ZIO PRELIM RESULTS: NORMAL

## 2025-08-07 ENCOUNTER — TELEPHONE (OUTPATIENT)
Dept: FAMILY MEDICINE | Facility: CLINIC | Age: 80
End: 2025-08-07

## 2025-08-07 ENCOUNTER — APPOINTMENT (OUTPATIENT)
Dept: ULTRASOUND IMAGING | Facility: HOSPITAL | Age: 80
End: 2025-08-07
Attending: EMERGENCY MEDICINE
Payer: COMMERCIAL

## 2025-08-07 ENCOUNTER — HOSPITAL ENCOUNTER (EMERGENCY)
Facility: HOSPITAL | Age: 80
Discharge: HOME | End: 2025-08-07
Attending: EMERGENCY MEDICINE | Admitting: EMERGENCY MEDICINE
Payer: COMMERCIAL

## 2025-08-07 ENCOUNTER — APPOINTMENT (OUTPATIENT)
Dept: RADIOLOGY | Facility: HOSPITAL | Age: 80
End: 2025-08-07
Attending: EMERGENCY MEDICINE
Payer: COMMERCIAL

## 2025-08-07 ENCOUNTER — ALLIED HEALTH/NURSE VISIT (OUTPATIENT)
Dept: FAMILY MEDICINE | Facility: CLINIC | Age: 80
End: 2025-08-07
Payer: COMMERCIAL

## 2025-08-07 ENCOUNTER — OFFICE VISIT (OUTPATIENT)
Dept: FAMILY MEDICINE | Facility: CLINIC | Age: 80
End: 2025-08-07
Payer: COMMERCIAL

## 2025-08-07 VITALS
HEIGHT: 72 IN | HEART RATE: 66 BPM | OXYGEN SATURATION: 97 % | TEMPERATURE: 98.1 F | RESPIRATION RATE: 25 BRPM | BODY MASS INDEX: 25.73 KG/M2 | DIASTOLIC BLOOD PRESSURE: 74 MMHG | WEIGHT: 190 LBS | SYSTOLIC BLOOD PRESSURE: 148 MMHG

## 2025-08-07 VITALS — HEART RATE: 62 BPM | DIASTOLIC BLOOD PRESSURE: 75 MMHG | OXYGEN SATURATION: 97 % | SYSTOLIC BLOOD PRESSURE: 173 MMHG

## 2025-08-07 VITALS
WEIGHT: 192 LBS | SYSTOLIC BLOOD PRESSURE: 165 MMHG | BODY MASS INDEX: 26.88 KG/M2 | HEART RATE: 61 BPM | DIASTOLIC BLOOD PRESSURE: 80 MMHG | TEMPERATURE: 98.5 F | HEIGHT: 71 IN

## 2025-08-07 DIAGNOSIS — R53.81 MALAISE AND FATIGUE: ICD-10-CM

## 2025-08-07 DIAGNOSIS — R53.83 MALAISE AND FATIGUE: ICD-10-CM

## 2025-08-07 DIAGNOSIS — I25.10 CORONARY ARTERY CALCIFICATION SEEN ON CAT SCAN: ICD-10-CM

## 2025-08-07 DIAGNOSIS — R60.0 PERIPHERAL EDEMA: Primary | ICD-10-CM

## 2025-08-07 DIAGNOSIS — R60.0 PERIPHERAL EDEMA: ICD-10-CM

## 2025-08-07 DIAGNOSIS — R94.31 ABNORMAL ELECTROCARDIOGRAM: ICD-10-CM

## 2025-08-07 DIAGNOSIS — E83.42 HYPOMAGNESEMIA: ICD-10-CM

## 2025-08-07 DIAGNOSIS — Z01.30 BP CHECK: Primary | ICD-10-CM

## 2025-08-07 DIAGNOSIS — R53.83 FATIGUE, UNSPECIFIED TYPE: Primary | ICD-10-CM

## 2025-08-07 DIAGNOSIS — I50.9 CONGESTIVE HEART FAILURE, UNSPECIFIED HF CHRONICITY, UNSPECIFIED HEART FAILURE TYPE (H): ICD-10-CM

## 2025-08-07 LAB
ALBUMIN UR-MCNC: NEGATIVE MG/DL
ANION GAP SERPL CALCULATED.3IONS-SCNC: 9 MMOL/L (ref 7–15)
APPEARANCE UR: CLEAR
BASOPHILS # BLD AUTO: 0 10E3/UL (ref 0–0.2)
BASOPHILS NFR BLD AUTO: 0 %
BILIRUB UR QL STRIP: NEGATIVE
BUN SERPL-MCNC: 19 MG/DL (ref 8–23)
CALCIUM SERPL-MCNC: 8.8 MG/DL (ref 8.8–10.4)
CHLORIDE SERPL-SCNC: 103 MMOL/L (ref 98–107)
COLOR UR AUTO: YELLOW
CREAT SERPL-MCNC: 1.06 MG/DL (ref 0.67–1.17)
D DIMER PPP FEU-MCNC: 1.06 UG/ML FEU (ref 0–0.5)
EGFRCR SERPLBLD CKD-EPI 2021: 71 ML/MIN/1.73M2
EOSINOPHIL # BLD AUTO: 0.1 10E3/UL (ref 0–0.7)
EOSINOPHIL NFR BLD AUTO: 1 %
ERYTHROCYTE [DISTWIDTH] IN BLOOD BY AUTOMATED COUNT: 16.3 % (ref 10–15)
GLUCOSE SERPL-MCNC: 87 MG/DL (ref 70–99)
GLUCOSE UR STRIP-MCNC: NEGATIVE MG/DL
HCO3 SERPL-SCNC: 22 MMOL/L (ref 22–29)
HCT VFR BLD AUTO: 35.2 % (ref 40–53)
HGB BLD-MCNC: 12 G/DL (ref 13.3–17.7)
HGB UR QL STRIP: NEGATIVE
HOLD SPECIMEN: NORMAL
HYALINE CASTS: 1 /LPF
IMM GRANULOCYTES # BLD: 0 10E3/UL
IMM GRANULOCYTES NFR BLD: 1 %
KETONES UR STRIP-MCNC: NEGATIVE MG/DL
LEUKOCYTE ESTERASE UR QL STRIP: NEGATIVE
LYMPHOCYTES # BLD AUTO: 1.8 10E3/UL (ref 0.8–5.3)
LYMPHOCYTES NFR BLD AUTO: 25 %
MAGNESIUM SERPL-MCNC: 1.5 MG/DL (ref 1.7–2.3)
MCH RBC QN AUTO: 31.1 PG (ref 26.5–33)
MCHC RBC AUTO-ENTMCNC: 34.1 G/DL (ref 31.5–36.5)
MCV RBC AUTO: 91 FL (ref 78–100)
MONOCYTES # BLD AUTO: 0.9 10E3/UL (ref 0–1.3)
MONOCYTES NFR BLD AUTO: 12 %
MUCOUS THREADS #/AREA URNS LPF: PRESENT /LPF
NEUTROPHILS # BLD AUTO: 4.6 10E3/UL (ref 1.6–8.3)
NEUTROPHILS NFR BLD AUTO: 62 %
NITRATE UR QL: NEGATIVE
NRBC # BLD AUTO: 0 10E3/UL
NRBC BLD AUTO-RTO: 0 /100
NT-PROBNP SERPL-MCNC: 1458 PG/ML (ref 0–852)
PH UR STRIP: 6 [PH] (ref 5–7)
PLATELET # BLD AUTO: 252 10E3/UL (ref 150–450)
POTASSIUM SERPL-SCNC: 5 MMOL/L (ref 3.4–5.3)
RBC # BLD AUTO: 3.86 10E6/UL (ref 4.4–5.9)
RBC URINE: 3 /HPF
SODIUM SERPL-SCNC: 134 MMOL/L (ref 135–145)
SP GR UR STRIP: 1.02 (ref 1–1.03)
SQUAMOUS EPITHELIAL: <1 /HPF
TROPONIN T SERPL HS-MCNC: 19 NG/L
TROPONIN T SERPL HS-MCNC: 20 NG/L
TSH SERPL DL<=0.005 MIU/L-ACNC: 2.19 UIU/ML (ref 0.3–4.2)
UROBILINOGEN UR STRIP-MCNC: 2 MG/DL
WBC # BLD AUTO: 7.5 10E3/UL (ref 4–11)
WBC URINE: 1 /HPF

## 2025-08-07 PROCEDURE — 85379 FIBRIN DEGRADATION QUANT: CPT | Performed by: EMERGENCY MEDICINE

## 2025-08-07 PROCEDURE — 84443 ASSAY THYROID STIM HORMONE: CPT | Performed by: EMERGENCY MEDICINE

## 2025-08-07 PROCEDURE — 36415 COLL VENOUS BLD VENIPUNCTURE: CPT | Performed by: EMERGENCY MEDICINE

## 2025-08-07 PROCEDURE — 80048 BASIC METABOLIC PNL TOTAL CA: CPT | Performed by: EMERGENCY MEDICINE

## 2025-08-07 PROCEDURE — 85018 HEMOGLOBIN: CPT | Performed by: EMERGENCY MEDICINE

## 2025-08-07 PROCEDURE — 83735 ASSAY OF MAGNESIUM: CPT | Performed by: EMERGENCY MEDICINE

## 2025-08-07 PROCEDURE — 71046 X-RAY EXAM CHEST 2 VIEWS: CPT

## 2025-08-07 PROCEDURE — 93005 ELECTROCARDIOGRAM TRACING: CPT | Performed by: EMERGENCY MEDICINE

## 2025-08-07 PROCEDURE — 81001 URINALYSIS AUTO W/SCOPE: CPT | Performed by: EMERGENCY MEDICINE

## 2025-08-07 PROCEDURE — 83880 ASSAY OF NATRIURETIC PEPTIDE: CPT | Performed by: EMERGENCY MEDICINE

## 2025-08-07 PROCEDURE — 93005 ELECTROCARDIOGRAM TRACING: CPT | Performed by: STUDENT IN AN ORGANIZED HEALTH CARE EDUCATION/TRAINING PROGRAM

## 2025-08-07 PROCEDURE — 99285 EMERGENCY DEPT VISIT HI MDM: CPT | Mod: 25 | Performed by: EMERGENCY MEDICINE

## 2025-08-07 PROCEDURE — 93970 EXTREMITY STUDY: CPT

## 2025-08-07 PROCEDURE — 84484 ASSAY OF TROPONIN QUANT: CPT | Performed by: EMERGENCY MEDICINE

## 2025-08-07 PROCEDURE — 99285 EMERGENCY DEPT VISIT HI MDM: CPT | Mod: 25

## 2025-08-07 RX ORDER — MAGNESIUM OXIDE 400 MG/1
400 TABLET ORAL DAILY
Qty: 7 TABLET | Refills: 0 | Status: SHIPPED | OUTPATIENT
Start: 2025-08-07 | End: 2025-08-14

## 2025-08-07 RX ORDER — FUROSEMIDE 20 MG/1
20 TABLET ORAL DAILY
Qty: 14 TABLET | Refills: 0 | Status: SHIPPED | OUTPATIENT
Start: 2025-08-07 | End: 2025-08-21

## 2025-08-07 ASSESSMENT — ENCOUNTER SYMPTOMS
FATIGUE: 1
SHORTNESS OF BREATH: 0
APPETITE CHANGE: 1
FEVER: 0

## 2025-08-07 ASSESSMENT — ACTIVITIES OF DAILY LIVING (ADL)
ADLS_ACUITY_SCORE: 46

## 2025-08-07 ASSESSMENT — COLUMBIA-SUICIDE SEVERITY RATING SCALE - C-SSRS
1. IN THE PAST MONTH, HAVE YOU WISHED YOU WERE DEAD OR WISHED YOU COULD GO TO SLEEP AND NOT WAKE UP?: NO
6. HAVE YOU EVER DONE ANYTHING, STARTED TO DO ANYTHING, OR PREPARED TO DO ANYTHING TO END YOUR LIFE?: NO
2. HAVE YOU ACTUALLY HAD ANY THOUGHTS OF KILLING YOURSELF IN THE PAST MONTH?: NO

## 2025-08-08 LAB
ATRIAL RATE - MUSE: 56 BPM
DIASTOLIC BLOOD PRESSURE - MUSE: NORMAL MMHG
INTERPRETATION ECG - MUSE: NORMAL
P AXIS - MUSE: 72 DEGREES
PR INTERVAL - MUSE: 184 MS
QRS DURATION - MUSE: 114 MS
QT - MUSE: 436 MS
QTC - MUSE: 420 MS
R AXIS - MUSE: 22 DEGREES
SYSTOLIC BLOOD PRESSURE - MUSE: NORMAL MMHG
T AXIS - MUSE: 101 DEGREES
VENTRICULAR RATE- MUSE: 56 BPM

## 2025-08-11 ENCOUNTER — TELEPHONE (OUTPATIENT)
Dept: CARDIOLOGY | Facility: CLINIC | Age: 80
End: 2025-08-11
Payer: COMMERCIAL

## 2025-08-11 DIAGNOSIS — I50.9 HEART FAILURE (H): Primary | ICD-10-CM

## 2025-08-11 DIAGNOSIS — I48.91 ATRIAL FIBRILLATION WITH RVR (H): ICD-10-CM

## 2025-08-11 DIAGNOSIS — I21.4 NON-ST ELEVATION (NSTEMI) MYOCARDIAL INFARCTION (H): ICD-10-CM

## 2025-08-11 DIAGNOSIS — I48.91 NEW ONSET ATRIAL FIBRILLATION (H): ICD-10-CM

## 2025-08-11 DIAGNOSIS — I10 BENIGN ESSENTIAL HYPERTENSION: ICD-10-CM

## 2025-08-11 DIAGNOSIS — I25.10 CORONARY ARTERY CALCIFICATION SEEN ON CAT SCAN: ICD-10-CM

## 2025-08-11 DIAGNOSIS — Z86.79 HISTORY OF HIGH BLOOD PRESSURE: ICD-10-CM

## 2025-08-13 ENCOUNTER — OFFICE VISIT (OUTPATIENT)
Dept: CARDIOLOGY | Facility: CLINIC | Age: 80
End: 2025-08-13
Payer: COMMERCIAL

## 2025-08-13 ENCOUNTER — TELEPHONE (OUTPATIENT)
Dept: CARDIOLOGY | Facility: CLINIC | Age: 80
End: 2025-08-13

## 2025-08-13 ENCOUNTER — MYC MEDICAL ADVICE (OUTPATIENT)
Dept: CARDIOLOGY | Facility: CLINIC | Age: 80
End: 2025-08-13

## 2025-08-13 ENCOUNTER — LAB (OUTPATIENT)
Dept: LAB | Facility: CLINIC | Age: 80
End: 2025-08-13
Payer: COMMERCIAL

## 2025-08-13 VITALS
SYSTOLIC BLOOD PRESSURE: 138 MMHG | HEART RATE: 63 BPM | HEIGHT: 72 IN | WEIGHT: 190.3 LBS | BODY MASS INDEX: 25.78 KG/M2 | OXYGEN SATURATION: 97 % | DIASTOLIC BLOOD PRESSURE: 67 MMHG

## 2025-08-13 DIAGNOSIS — I10 BENIGN ESSENTIAL HYPERTENSION: ICD-10-CM

## 2025-08-13 DIAGNOSIS — I42.8 NICM (NONISCHEMIC CARDIOMYOPATHY) (H): ICD-10-CM

## 2025-08-13 DIAGNOSIS — I21.4 NON-ST ELEVATION (NSTEMI) MYOCARDIAL INFARCTION (H): ICD-10-CM

## 2025-08-13 DIAGNOSIS — R53.83 FATIGUE, UNSPECIFIED TYPE: ICD-10-CM

## 2025-08-13 DIAGNOSIS — I50.22 CHRONIC SYSTOLIC HEART FAILURE (H): Primary | ICD-10-CM

## 2025-08-13 DIAGNOSIS — I48.91 NEW ONSET ATRIAL FIBRILLATION (H): ICD-10-CM

## 2025-08-13 DIAGNOSIS — I48.91 NEW ONSET ATRIAL FIBRILLATION (H): Primary | ICD-10-CM

## 2025-08-13 DIAGNOSIS — I50.9 CHF (CONGESTIVE HEART FAILURE) (H): ICD-10-CM

## 2025-08-13 DIAGNOSIS — I48.91 ATRIAL FIBRILLATION WITH RVR (H): ICD-10-CM

## 2025-08-13 DIAGNOSIS — J32.0 CHRONIC MAXILLARY SINUSITIS: ICD-10-CM

## 2025-08-13 LAB
ANION GAP SERPL CALCULATED.3IONS-SCNC: 10 MMOL/L (ref 7–15)
BUN SERPL-MCNC: 26.8 MG/DL (ref 8–23)
CALCIUM SERPL-MCNC: 9.5 MG/DL (ref 8.8–10.4)
CHLORIDE SERPL-SCNC: 105 MMOL/L (ref 98–107)
CREAT SERPL-MCNC: 0.96 MG/DL (ref 0.67–1.17)
EGFRCR SERPLBLD CKD-EPI 2021: 80 ML/MIN/1.73M2
ERYTHROCYTE [DISTWIDTH] IN BLOOD BY AUTOMATED COUNT: 16.8 % (ref 10–15)
GLUCOSE SERPL-MCNC: 112 MG/DL (ref 70–99)
HCO3 SERPL-SCNC: 22 MMOL/L (ref 22–29)
HCT VFR BLD AUTO: 37.3 % (ref 40–53)
HGB BLD-MCNC: 12.5 G/DL (ref 13.3–17.7)
MCH RBC QN AUTO: 30.9 PG (ref 26.5–33)
MCHC RBC AUTO-ENTMCNC: 33.5 G/DL (ref 31.5–36.5)
MCV RBC AUTO: 92.3 FL (ref 78–100)
NT-PROBNP SERPL-MCNC: 940 PG/ML (ref 0–852)
PLATELET # BLD AUTO: 255 10E3/UL (ref 150–450)
POTASSIUM SERPL-SCNC: 4.7 MMOL/L (ref 3.4–5.3)
RBC # BLD AUTO: 4.04 10E6/UL (ref 4.4–5.9)
SODIUM SERPL-SCNC: 137 MMOL/L (ref 135–145)
WBC # BLD AUTO: 8.18 10E3/UL (ref 4–11)

## 2025-08-13 PROCEDURE — 85027 COMPLETE CBC AUTOMATED: CPT

## 2025-08-13 PROCEDURE — 80048 BASIC METABOLIC PNL TOTAL CA: CPT

## 2025-08-13 ASSESSMENT — PAIN SCALES - GENERAL: PAINLEVEL_OUTOF10: NO PAIN (0)

## 2025-08-14 ENCOUNTER — PATIENT OUTREACH (OUTPATIENT)
Dept: CARE COORDINATION | Facility: CLINIC | Age: 80
End: 2025-08-14
Payer: COMMERCIAL

## 2025-08-18 ENCOUNTER — TELEPHONE (OUTPATIENT)
Dept: CARDIOLOGY | Facility: CLINIC | Age: 80
End: 2025-08-18
Payer: COMMERCIAL

## 2025-08-18 ENCOUNTER — PATIENT OUTREACH (OUTPATIENT)
Dept: CARE COORDINATION | Facility: CLINIC | Age: 80
End: 2025-08-18
Payer: COMMERCIAL

## 2025-08-19 ENCOUNTER — TELEPHONE (OUTPATIENT)
Dept: CARDIOLOGY | Facility: CLINIC | Age: 80
End: 2025-08-19
Payer: COMMERCIAL

## 2025-08-19 ENCOUNTER — NURSE TRIAGE (OUTPATIENT)
Dept: CARDIOLOGY | Facility: CLINIC | Age: 80
End: 2025-08-19
Payer: COMMERCIAL

## 2025-08-20 ENCOUNTER — OFFICE VISIT (OUTPATIENT)
Dept: FAMILY MEDICINE | Facility: CLINIC | Age: 80
End: 2025-08-20
Payer: COMMERCIAL

## 2025-08-20 VITALS
RESPIRATION RATE: 16 BRPM | TEMPERATURE: 98.1 F | BODY MASS INDEX: 24.79 KG/M2 | HEIGHT: 72 IN | OXYGEN SATURATION: 98 % | DIASTOLIC BLOOD PRESSURE: 68 MMHG | WEIGHT: 183 LBS | SYSTOLIC BLOOD PRESSURE: 112 MMHG | HEART RATE: 80 BPM

## 2025-08-20 DIAGNOSIS — I21.4 NON-ST ELEVATION (NSTEMI) MYOCARDIAL INFARCTION (H): ICD-10-CM

## 2025-08-20 DIAGNOSIS — I48.91 ATRIAL FIBRILLATION WITH RVR (H): ICD-10-CM

## 2025-08-20 DIAGNOSIS — M25.512 ACUTE PAIN OF LEFT SHOULDER: Primary | ICD-10-CM

## 2025-08-20 DIAGNOSIS — Z79.01 LONG TERM CURRENT USE OF ANTICOAGULANT THERAPY: ICD-10-CM

## 2025-08-20 DIAGNOSIS — I50.22 CHRONIC SYSTOLIC HEART FAILURE (H): ICD-10-CM

## 2025-08-20 ASSESSMENT — PAIN SCALES - GENERAL: PAINLEVEL_OUTOF10: SEVERE PAIN (8)

## 2025-08-25 ENCOUNTER — DOCUMENTATION ONLY (OUTPATIENT)
Dept: CARDIOLOGY | Facility: CLINIC | Age: 80
End: 2025-08-25
Payer: COMMERCIAL

## 2025-08-25 DIAGNOSIS — I50.22 CHRONIC SYSTOLIC HEART FAILURE (H): Primary | ICD-10-CM

## 2025-08-27 ENCOUNTER — TELEPHONE (OUTPATIENT)
Dept: CARDIOLOGY | Facility: CLINIC | Age: 80
End: 2025-08-27

## 2025-08-27 ENCOUNTER — OFFICE VISIT (OUTPATIENT)
Dept: CARDIOLOGY | Facility: CLINIC | Age: 80
End: 2025-08-27
Payer: COMMERCIAL

## 2025-08-27 ENCOUNTER — LAB (OUTPATIENT)
Dept: LAB | Facility: CLINIC | Age: 80
End: 2025-08-27
Payer: COMMERCIAL

## 2025-08-27 VITALS
DIASTOLIC BLOOD PRESSURE: 63 MMHG | WEIGHT: 181.7 LBS | HEART RATE: 72 BPM | SYSTOLIC BLOOD PRESSURE: 124 MMHG | OXYGEN SATURATION: 98 % | BODY MASS INDEX: 24.61 KG/M2 | HEIGHT: 72 IN

## 2025-08-27 DIAGNOSIS — R53.83 FATIGUE, UNSPECIFIED TYPE: ICD-10-CM

## 2025-08-27 DIAGNOSIS — R63.4 WEIGHT LOSS: ICD-10-CM

## 2025-08-27 DIAGNOSIS — I50.22 CHRONIC SYSTOLIC HEART FAILURE (H): ICD-10-CM

## 2025-08-27 DIAGNOSIS — I50.22 CHRONIC SYSTOLIC HEART FAILURE (H): Primary | ICD-10-CM

## 2025-08-27 DIAGNOSIS — I42.8 NICM (NONISCHEMIC CARDIOMYOPATHY) (H): ICD-10-CM

## 2025-08-27 DIAGNOSIS — I10 BENIGN ESSENTIAL HYPERTENSION: ICD-10-CM

## 2025-08-27 LAB
ANION GAP SERPL CALCULATED.3IONS-SCNC: 11 MMOL/L (ref 7–15)
BUN SERPL-MCNC: 19.9 MG/DL (ref 8–23)
CALCIUM SERPL-MCNC: 9.6 MG/DL (ref 8.8–10.4)
CHLORIDE SERPL-SCNC: 101 MMOL/L (ref 98–107)
CREAT SERPL-MCNC: 0.95 MG/DL (ref 0.67–1.17)
EGFRCR SERPLBLD CKD-EPI 2021: 81 ML/MIN/1.73M2
GLUCOSE SERPL-MCNC: 79 MG/DL (ref 70–99)
HCO3 SERPL-SCNC: 23 MMOL/L (ref 22–29)
POTASSIUM SERPL-SCNC: 4.7 MMOL/L (ref 3.4–5.3)
SODIUM SERPL-SCNC: 135 MMOL/L (ref 135–145)

## 2025-08-27 ASSESSMENT — PAIN SCALES - GENERAL: PAINLEVEL_OUTOF10: NO PAIN (0)

## 2025-09-02 PROBLEM — Z53.9 ERRONEOUS ENCOUNTER--DISREGARD: Status: ACTIVE | Noted: 2025-09-02

## 2025-09-03 ENCOUNTER — OFFICE VISIT (OUTPATIENT)
Dept: FAMILY MEDICINE | Facility: CLINIC | Age: 80
End: 2025-09-03
Payer: COMMERCIAL

## 2025-09-03 VITALS
HEIGHT: 72 IN | DIASTOLIC BLOOD PRESSURE: 70 MMHG | SYSTOLIC BLOOD PRESSURE: 122 MMHG | HEART RATE: 79 BPM | TEMPERATURE: 97.7 F | BODY MASS INDEX: 25.19 KG/M2 | WEIGHT: 186 LBS | RESPIRATION RATE: 16 BRPM | OXYGEN SATURATION: 100 %

## 2025-09-03 DIAGNOSIS — Z23 NEED FOR VACCINATION: ICD-10-CM

## 2025-09-03 DIAGNOSIS — I25.10 CORONARY ARTERY CALCIFICATION SEEN ON CAT SCAN: ICD-10-CM

## 2025-09-03 DIAGNOSIS — R53.83 FATIGUE, UNSPECIFIED TYPE: ICD-10-CM

## 2025-09-03 DIAGNOSIS — E83.42 HYPOMAGNESEMIA: ICD-10-CM

## 2025-09-03 DIAGNOSIS — Z13.6 SCREENING FOR CARDIOVASCULAR CONDITION: ICD-10-CM

## 2025-09-03 DIAGNOSIS — E78.2 MIXED HYPERLIPIDEMIA: Primary | ICD-10-CM

## 2025-09-03 DIAGNOSIS — E55.9 VITAMIN D DEFICIENCY: ICD-10-CM

## 2025-09-03 PROCEDURE — 83735 ASSAY OF MAGNESIUM: CPT | Performed by: NURSE PRACTITIONER

## 2025-09-03 PROCEDURE — 36415 COLL VENOUS BLD VENIPUNCTURE: CPT | Performed by: NURSE PRACTITIONER

## 2025-09-03 PROCEDURE — 80061 LIPID PANEL: CPT | Performed by: NURSE PRACTITIONER

## 2025-09-03 PROCEDURE — 82306 VITAMIN D 25 HYDROXY: CPT | Performed by: NURSE PRACTITIONER

## 2025-09-03 ASSESSMENT — ENCOUNTER SYMPTOMS: FATIGUE: 1

## 2025-09-04 ENCOUNTER — TELEPHONE (OUTPATIENT)
Dept: FAMILY MEDICINE | Facility: CLINIC | Age: 80
End: 2025-09-04
Payer: COMMERCIAL

## 2025-09-04 LAB
CHOLEST SERPL-MCNC: 184 MG/DL
FASTING STATUS PATIENT QL REPORTED: NO
HDLC SERPL-MCNC: 28 MG/DL
LDLC SERPL CALC-MCNC: 133 MG/DL
MAGNESIUM SERPL-MCNC: 2.1 MG/DL (ref 1.7–2.3)
NONHDLC SERPL-MCNC: 156 MG/DL
TRIGL SERPL-MCNC: 117 MG/DL
VIT D+METAB SERPL-MCNC: 29 NG/ML (ref 20–50)

## (undated) DEVICE — SUCTION CANISTER MEDIVAC LINER 3000ML W/LID 65651-530

## (undated) DEVICE — PAD DEFIBRILLATOR ECG ADULT STERILE 2502